# Patient Record
Sex: MALE | Race: WHITE | NOT HISPANIC OR LATINO | Employment: FULL TIME | URBAN - METROPOLITAN AREA
[De-identification: names, ages, dates, MRNs, and addresses within clinical notes are randomized per-mention and may not be internally consistent; named-entity substitution may affect disease eponyms.]

---

## 2019-01-04 ENCOUNTER — TELEPHONE (OUTPATIENT)
Dept: INTERNAL MEDICINE | Facility: CLINIC | Age: 44
End: 2019-01-04

## 2019-01-04 DIAGNOSIS — R21 RASH AND NONSPECIFIC SKIN ERUPTION: Primary | ICD-10-CM

## 2019-01-04 NOTE — TELEPHONE ENCOUNTER
PT CALLED IN STATING THAT HIS CONDITION DID NOT CLEAR UP AND IS NEEDING  A REFERRAL TO DERMATOLOGIST. WOULD LIKE A ONE CLOSE TO [address]

## 2019-01-04 NOTE — TELEPHONE ENCOUNTER
Pt saw Sandy on 11/29/18 and per her note had a rash. Her plan was to refer him to derm if it persisted. Please send referral to derm.

## 2019-01-04 NOTE — TELEPHONE ENCOUNTER
Find out what condition- If it is as nonspecific as rash that is okay. Let me know so I can send the order for derm [facility]

## 2019-01-14 PROBLEM — K21.9 GASTROESOPHAGEAL REFLUX DISEASE WITHOUT ESOPHAGITIS: Status: ACTIVE | Noted: 2019-01-14

## 2019-01-14 PROBLEM — R13.19 ESOPHAGEAL DYSPHAGIA: Status: ACTIVE | Noted: 2019-01-14

## 2019-01-14 PROBLEM — E53.8 B12 DEFICIENCY: Status: ACTIVE | Noted: 2019-01-14

## 2019-01-14 PROBLEM — M54.9 BACK PAIN WITHOUT RADICULOPATHY: Status: ACTIVE | Noted: 2019-01-14

## 2019-01-14 PROBLEM — I10 BENIGN ESSENTIAL HYPERTENSION: Status: ACTIVE | Noted: 2019-01-14

## 2019-01-14 PROBLEM — E78.2 MIXED HYPERLIPIDEMIA: Status: ACTIVE | Noted: 2019-01-14

## 2019-01-23 RX ORDER — HYDROCHLOROTHIAZIDE 25 MG/1
TABLET ORAL
Qty: 90 TABLET | Refills: 0 | Status: SHIPPED | OUTPATIENT
Start: 2019-01-23 | End: 2019-02-07 | Stop reason: DRUGHIGH

## 2019-01-25 ENCOUNTER — TELEPHONE (OUTPATIENT)
Dept: INTERNAL MEDICINE | Facility: CLINIC | Age: 44
End: 2019-01-25

## 2019-01-25 DIAGNOSIS — R23.8 OTHER SKIN CHANGES: Primary | ICD-10-CM

## 2019-02-06 PROBLEM — J30.9 ALLERGIC RHINITIS: Status: ACTIVE | Noted: 2019-02-06

## 2019-02-06 PROBLEM — M76.60 ACHILLES BURSITIS: Status: ACTIVE | Noted: 2019-02-06

## 2019-02-06 PROBLEM — R21 RASH/SKIN ERUPTION: Status: ACTIVE | Noted: 2019-02-06

## 2019-02-06 PROBLEM — E66.3 OVERWEIGHT (BMI 25.0-29.9): Status: ACTIVE | Noted: 2019-02-06

## 2019-02-06 PROBLEM — F43.0 ACUTE STRESS DISORDER: Status: ACTIVE | Noted: 2019-02-06

## 2019-02-06 PROBLEM — L21.9 SEBORRHEIC DERMATITIS: Status: ACTIVE | Noted: 2018-02-16

## 2019-02-07 ENCOUNTER — OFFICE VISIT (OUTPATIENT)
Dept: INTERNAL MEDICINE | Facility: CLINIC | Age: 44
End: 2019-02-07

## 2019-02-07 VITALS
WEIGHT: 217 LBS | HEART RATE: 51 BPM | HEIGHT: 73 IN | SYSTOLIC BLOOD PRESSURE: 122 MMHG | TEMPERATURE: 98.4 F | BODY MASS INDEX: 28.76 KG/M2 | DIASTOLIC BLOOD PRESSURE: 98 MMHG

## 2019-02-07 DIAGNOSIS — K21.9 GASTROESOPHAGEAL REFLUX DISEASE WITHOUT ESOPHAGITIS: ICD-10-CM

## 2019-02-07 DIAGNOSIS — E78.2 MIXED HYPERLIPIDEMIA: ICD-10-CM

## 2019-02-07 DIAGNOSIS — E53.8 B12 DEFICIENCY: ICD-10-CM

## 2019-02-07 DIAGNOSIS — E66.3 OVERWEIGHT (BMI 25.0-29.9): ICD-10-CM

## 2019-02-07 DIAGNOSIS — R13.19 ESOPHAGEAL DYSPHAGIA: ICD-10-CM

## 2019-02-07 DIAGNOSIS — Z00.00 ANNUAL PHYSICAL EXAM: Primary | ICD-10-CM

## 2019-02-07 DIAGNOSIS — R21 RASH OF FACE: ICD-10-CM

## 2019-02-07 DIAGNOSIS — L21.9 SEBORRHEIC DERMATITIS: ICD-10-CM

## 2019-02-07 DIAGNOSIS — I10 BENIGN ESSENTIAL HYPERTENSION: ICD-10-CM

## 2019-02-07 LAB
ALBUMIN SERPL-MCNC: 4.86 G/DL (ref 3.2–4.8)
ALBUMIN/GLOB SERPL: 2 G/DL (ref 1.5–2.5)
ALP SERPL-CCNC: 47 U/L (ref 25–100)
ALT SERPL W P-5'-P-CCNC: 40 U/L (ref 7–40)
ANION GAP SERPL CALCULATED.3IONS-SCNC: 7 MMOL/L (ref 3–11)
ARTICHOKE IGE QN: 111 MG/DL (ref 0–130)
AST SERPL-CCNC: 27 U/L (ref 0–33)
BASOPHILS # BLD AUTO: 0.02 10*3/MM3 (ref 0–0.2)
BASOPHILS NFR BLD AUTO: 0.3 % (ref 0–1)
BILIRUB SERPL-MCNC: 1.1 MG/DL (ref 0.3–1.2)
BILIRUB UR QL STRIP: NEGATIVE
BUN BLD-MCNC: 19 MG/DL (ref 9–23)
BUN/CREAT SERPL: 17.8 (ref 7–25)
CALCIUM SPEC-SCNC: 9.9 MG/DL (ref 8.7–10.4)
CHLORIDE SERPL-SCNC: 102 MMOL/L (ref 99–109)
CHOLEST SERPL-MCNC: 174 MG/DL (ref 0–200)
CLARITY UR: CLEAR
CO2 SERPL-SCNC: 30 MMOL/L (ref 20–31)
COLOR UR: YELLOW
CREAT BLD-MCNC: 1.07 MG/DL (ref 0.6–1.3)
DEPRECATED RDW RBC AUTO: 41 FL (ref 37–54)
EOSINOPHIL # BLD AUTO: 0.18 10*3/MM3 (ref 0–0.3)
EOSINOPHIL NFR BLD AUTO: 2.9 % (ref 0–3)
ERYTHROCYTE [DISTWIDTH] IN BLOOD BY AUTOMATED COUNT: 12.9 % (ref 11.3–14.5)
FOLATE SERPL-MCNC: 13.81 NG/ML (ref 3.2–20)
GFR SERPL CREATININE-BSD FRML MDRD: 75 ML/MIN/1.73
GLOBULIN UR ELPH-MCNC: 2.4 GM/DL
GLUCOSE BLD-MCNC: 92 MG/DL (ref 70–100)
GLUCOSE UR STRIP-MCNC: NEGATIVE MG/DL
HCT VFR BLD AUTO: 50.6 % (ref 38.9–50.9)
HDLC SERPL-MCNC: 37 MG/DL (ref 40–60)
HGB BLD-MCNC: 17.2 G/DL (ref 13.1–17.5)
HGB UR QL STRIP.AUTO: NEGATIVE
IMM GRANULOCYTES # BLD AUTO: 0.01 10*3/MM3 (ref 0–0.03)
IMM GRANULOCYTES NFR BLD AUTO: 0.2 % (ref 0–0.6)
KETONES UR QL STRIP: NEGATIVE
LEUKOCYTE ESTERASE UR QL STRIP.AUTO: NEGATIVE
LYMPHOCYTES # BLD AUTO: 1.68 10*3/MM3 (ref 0.6–4.8)
LYMPHOCYTES NFR BLD AUTO: 27.2 % (ref 24–44)
MCH RBC QN AUTO: 29.6 PG (ref 27–31)
MCHC RBC AUTO-ENTMCNC: 34 G/DL (ref 32–36)
MCV RBC AUTO: 87.1 FL (ref 80–99)
MONOCYTES # BLD AUTO: 0.5 10*3/MM3 (ref 0–1)
MONOCYTES NFR BLD AUTO: 8.1 % (ref 0–12)
NEUTROPHILS # BLD AUTO: 3.8 10*3/MM3 (ref 1.5–8.3)
NEUTROPHILS NFR BLD AUTO: 61.5 % (ref 41–71)
NITRITE UR QL STRIP: NEGATIVE
PH UR STRIP.AUTO: 5.5 [PH] (ref 5–8)
PLATELET # BLD AUTO: 270 10*3/MM3 (ref 150–450)
PMV BLD AUTO: 10.1 FL (ref 6–12)
POTASSIUM BLD-SCNC: 4.1 MMOL/L (ref 3.5–5.5)
PROT SERPL-MCNC: 7.3 G/DL (ref 5.7–8.2)
PROT UR QL STRIP: NEGATIVE
RBC # BLD AUTO: 5.81 10*6/MM3 (ref 4.2–5.76)
SODIUM BLD-SCNC: 139 MMOL/L (ref 132–146)
SP GR UR STRIP: 1.01 (ref 1–1.03)
TRIGL SERPL-MCNC: 166 MG/DL (ref 0–150)
TSH SERPL DL<=0.05 MIU/L-ACNC: 1.31 MIU/ML (ref 0.35–5.35)
UROBILINOGEN UR QL STRIP: NORMAL
WBC NRBC COR # BLD: 6.18 10*3/MM3 (ref 3.5–10.8)

## 2019-02-07 PROCEDURE — 86038 ANTINUCLEAR ANTIBODIES: CPT | Performed by: INTERNAL MEDICINE

## 2019-02-07 PROCEDURE — 80053 COMPREHEN METABOLIC PANEL: CPT | Performed by: INTERNAL MEDICINE

## 2019-02-07 PROCEDURE — 36415 COLL VENOUS BLD VENIPUNCTURE: CPT | Performed by: INTERNAL MEDICINE

## 2019-02-07 PROCEDURE — 80061 LIPID PANEL: CPT | Performed by: INTERNAL MEDICINE

## 2019-02-07 PROCEDURE — 84443 ASSAY THYROID STIM HORMONE: CPT | Performed by: INTERNAL MEDICINE

## 2019-02-07 PROCEDURE — 85025 COMPLETE CBC W/AUTO DIFF WBC: CPT | Performed by: INTERNAL MEDICINE

## 2019-02-07 PROCEDURE — 82746 ASSAY OF FOLIC ACID SERUM: CPT | Performed by: INTERNAL MEDICINE

## 2019-02-07 PROCEDURE — 82607 VITAMIN B-12: CPT | Performed by: INTERNAL MEDICINE

## 2019-02-07 PROCEDURE — 99213 OFFICE O/P EST LOW 20 MIN: CPT | Performed by: INTERNAL MEDICINE

## 2019-02-07 PROCEDURE — 99396 PREV VISIT EST AGE 40-64: CPT | Performed by: INTERNAL MEDICINE

## 2019-02-07 PROCEDURE — 81003 URINALYSIS AUTO W/O SCOPE: CPT | Performed by: INTERNAL MEDICINE

## 2019-02-07 RX ORDER — LISINOPRIL 5 MG/1
5 TABLET ORAL DAILY
Qty: 90 TABLET | Refills: 1 | Status: SHIPPED | OUTPATIENT
Start: 2019-02-07 | End: 2019-03-14 | Stop reason: DRUGHIGH

## 2019-02-07 RX ORDER — PANTOPRAZOLE SODIUM 40 MG/1
40 TABLET, DELAYED RELEASE ORAL DAILY
Qty: 90 TABLET | Refills: 1 | Status: SHIPPED | OUTPATIENT
Start: 2019-02-07 | End: 2020-07-10 | Stop reason: SDUPTHER

## 2019-02-07 NOTE — PATIENT INSTRUCTIONS
Diastolic blood pressure needs to be less than 85 all the time.  Today it was 98.  His systolic pressure tends to be controlled well.  Stop the hydrochlorothiazide.  Start lisinopril 5 mg once a day.  Monitor the blood pressure at home and write down the values.  We will go over those at the next visit.  Also avoid salt in the diet and resume regular exercise.  Losing some weight will also help.    Acid reflux and trouble swallowing with food sticking has been worse lately.  Start taking pantoprazole every morning before breakfast.  If you forget, take it later and will still help some.  Referral sent to [facility] gastroenterology for an EGD (upper endoscopy) to rule out esophageal stricture.  Education given on antireflux precautions including eating smaller meals, eating dinner earlier, never lying down after eating.    The rash on the face has not improved.  He has seen a dermatologist.  The dermatology PA recommended hydrocortisone cream twice a day.  That has not helped.  He has an appointment with Dr. Estrada next week.  We will check  ALICIA today to make sure there is no autoimmune reason for rash although it does not appear likely.  There is a possibility that hydrochlorothiazide could be causing the rash although it is only on the face.  That is another reason to stop the hydrochlorothiazide.    Improving the low-fat and low sugar diet and increasing exercise will help with the cholesterol and triglycerides.  Increasing exercise to 3-4 times a week and losing some weight will also help.  Food Choices for Gastroesophageal Reflux Disease, Adult  When you have gastroesophageal reflux disease (GERD), the foods you eat and your eating habits are very important. Choosing the right foods can help ease the discomfort of GERD. Consider working with a diet and nutrition specialist (dietitian) to help you make healthy food choices.  What general guidelines should I follow?  Eating plan  · Choose healthy foods low in fat,  such as fruits, vegetables, whole grains, low-fat dairy products, and lean meat, fish, and poultry.  · Eat frequent, small meals instead of three large meals each day. Eat your meals slowly, in a relaxed setting. Avoid bending over or lying down until 2-3 hours after eating.  · Limit high-fat foods such as fatty meats or fried foods.  · Limit your intake of oils, butter, and shortening to less than 8 teaspoons each day.  · Avoid the following:  ? Foods that cause symptoms. These may be different for different people. Keep a food diary to keep track of foods that cause symptoms.  ? Alcohol.  ? Drinking large amounts of liquid with meals.  ? Eating meals during the 2-3 hours before bed.  · Cook foods using methods other than frying. This may include baking, grilling, or broiling.  Lifestyle    · Maintain a healthy weight. Ask your health care provider what weight is healthy for you. If you need to lose weight, work with your health care provider to do so safely.  · Exercise for at least 30 minutes on 5 or more days each week, or as told by your health care provider.  · Avoid wearing clothes that fit tightly around your waist and chest.  · Do not use any products that contain nicotine or tobacco, such as cigarettes and e-cigarettes. If you need help quitting, ask your health care provider.  · Sleep with the head of your bed raised. Use a wedge under the mattress or blocks under the bed frame to raise the head of the bed.  What foods are not recommended?  The items listed may not be a complete list. Talk with your dietitian about what dietary choices are best for you.  Grains  Pastries or quick breads with added fat. French toast.  Vegetables  Deep fried vegetables. French fries. Any vegetables prepared with added fat. Any vegetables that cause symptoms. For some people this may include tomatoes and tomato products, chili peppers, onions and garlic, and horseradish.  Fruits  Any fruits prepared with added fat. Any  fruits that cause symptoms. For some people this may include citrus fruits, such as oranges, grapefruit, pineapple, and kp.  Meats and other protein foods  High-fat meats, such as fatty beef or pork, hot dogs, ribs, ham, sausage, salami and khanna. Fried meat or protein, including fried fish and fried chicken. Nuts and nut butters.  Dairy  Whole milk and chocolate milk. Sour cream. Cream. Ice cream. Cream cheese. Milk shakes.  Beverages  Coffee and tea, with or without caffeine. Carbonated beverages. Sodas. Energy drinks. Fruit juice made with acidic fruits (such as orange or grapefruit). Tomato juice. Alcoholic drinks.  Fats and oils  Butter. Margarine. Shortening. Ghee.  Sweets and desserts  Chocolate and cocoa. Donuts.  Seasoning and other foods  Pepper. Peppermint and spearmint. Any condiments, herbs, or seasonings that cause symptoms. For some people, this may include moreno, hot sauce, or vinegar-based salad dressings.  Summary  · When you have gastroesophageal reflux disease (GERD), food and lifestyle choices are very important to help ease the discomfort of GERD.  · Eat frequent, small meals instead of three large meals each day. Eat your meals slowly, in a relaxed setting. Avoid bending over or lying down until 2-3 hours after eating.  · Limit high-fat foods such as fatty meat or fried foods.  This information is not intended to replace advice given to you by your health care provider. Make sure you discuss any questions you have with your health care provider.  Document Released: 12/18/2006 Document Revised: 12/19/2017 Document Reviewed: 12/19/2017  [employer] Interactive Patient Education © 2018 [employer]    Heartburn  Heartburn is a type of pain or discomfort that can happen in the throat or chest. It is often described as a burning pain. It may also cause a bad taste in the mouth. Heartburn may feel worse when you lie down or bend over, and it is often worse at night. Heartburn may be caused by  stomach contents that move back up into the esophagus (reflux).  Follow these instructions at home:  Take these actions to decrease your discomfort and to help avoid complications.  Diet  · Follow a diet as recommended by your health care provider. This may involve avoiding foods and drinks such as:  ? Coffee and tea (with or without caffeine).  ? Drinks that contain alcohol.  ? Energy drinks and sports drinks.  ? Carbonated drinks or sodas.  ? Chocolate and cocoa.  ? Peppermint and mint flavorings.  ? Garlic and onions.  ? Horseradish.  ? Spicy and acidic foods, including peppers, chili powder, moreno powder, vinegar, hot sauces, and barbecue sauce.  ? Citrus fruit juices and citrus fruits, such as oranges, kp, and limes.  ? Tomato-based foods, such as red sauce, chili, salsa, and pizza with red sauce.  ? Fried and fatty foods, such as donuts, french fries, potato chips, and high-fat dressings.  ? High-fat meats, such as hot dogs and fatty cuts of red and white meats, such as rib eye steak, sausage, ham, and khanna.  ? High-fat dairy items, such as whole milk, butter, and cream cheese.  · Eat small, frequent meals instead of large meals.  · Avoid drinking large amounts of liquid with your meals.  · Avoid eating meals during the 2-3 hours before bedtime.  · Avoid lying down right after you eat.  · Do not exercise right after you eat.  General instructions  · Pay attention to any changes in your symptoms.  · Take over-the-counter and prescription medicines only as told by your health care provider. Do not take aspirin, ibuprofen, or other NSAIDs unless your health care provider told you to do so.  · Do not use any tobacco products, including cigarettes, chewing tobacco, and e-cigarettes. If you need help quitting, ask your health care provider.  · Wear loose-fitting clothing. Do not wear anything tight around your waist that causes pressure on your abdomen.  · Raise (elevate) the head of your bed about 6 inches (15  cm).  · Try to reduce your stress, such as with yoga or meditation. If you need help reducing stress, ask your health care provider.  · If you are overweight, reduce your weight to an amount that is healthy for you. Ask your health care provider for guidance about a safe weight loss goal.  · Keep all follow-up visits as told by your health care provider. This is important.  Contact a health care provider if:  · You have new symptoms.  · You have unexplained weight loss.  · You have difficulty swallowing, or it hurts to swallow.  · You have wheezing or a persistent cough.  · Your symptoms do not improve with treatment.  · You have frequent heartburn for more than two weeks.  Get help right away if:  · You have pain in your arms, neck, jaw, teeth, or back.  · You feel sweaty, dizzy, or light-headed.  · You have chest pain or shortness of breath.  · You vomit and your vomit looks like blood or coffee grounds.  · Your stool is bloody or black.  This information is not intended to replace advice given to you by your health care provider. Make sure you discuss any questions you have with your health care provider.  Document Released: 05/06/2010 Document Revised: 05/25/2017 Document Reviewed: 04/13/2016  [employer] Interactive Patient Education © 2018 [employer]    Heart-Healthy Eating Plan  Many factors influence your heart health, including eating and exercise habits. Heart (coronary) risk increases with abnormal blood fat (lipid) levels. Heart-healthy meal planning includes limiting unhealthy fats, increasing healthy fats, and making other small dietary changes. This includes maintaining a healthy body weight to help keep lipid levels within a normal range.  What is my plan?  Your health care provider recommends that you:  · Get no more than _________% of the total calories in your daily diet from fat.  · Limit your intake of saturated fat to less than _________% of your total calories each day.  · Limit the amount of  "cholesterol in your diet to less than _________ mg per day.    What types of fat should I choose?  · Choose healthy fats more often. Choose monounsaturated and polyunsaturated fats, such as olive oil and canola oil, flaxseeds, walnuts, almonds, and seeds.  · Eat more omega-3 fats. Good choices include salmon, mackerel, sardines, tuna, flaxseed oil, and ground flaxseeds. Aim to eat fish at least two times each week.  · Limit saturated fats. Saturated fats are primarily found in animal products, such as meats, butter, and cream. Plant sources of saturated fats include palm oil, palm kernel oil, and coconut oil.  · Avoid foods with partially hydrogenated oils in them. These contain trans fats. Examples of foods that contain trans fats are stick margarine, some tub margarines, cookies, crackers, and other baked goods.  What general guidelines do I need to follow?  · Check food labels carefully to identify foods with trans fats or high amounts of saturated fat.  · Fill one half of your plate with vegetables and green salads. Eat 4-5 servings of vegetables per day. A serving of vegetables equals 1 cup of raw leafy vegetables, ½ cup of raw or cooked cut-up vegetables, or ½ cup of vegetable juice.  · Fill one fourth of your plate with whole grains. Look for the word \"whole\" as the first word in the ingredient list.  · Fill one fourth of your plate with lean protein foods.  · Eat 4-5 servings of fruit per day. A serving of fruit equals one medium whole fruit, ¼ cup of dried fruit, ½ cup of fresh, frozen, or canned fruit, or ½ cup of 100% fruit juice.  · Eat more foods that contain soluble fiber. Examples of foods that contain this type of fiber are apples, broccoli, carrots, beans, peas, and barley. Aim to get 20-30 g of fiber per day.  · Eat more home-cooked food and less restaurant, buffet, and fast food.  · Limit or avoid alcohol.  · Limit foods that are high in starch and sugar.  · Avoid fried foods.  · Cook foods by " using methods other than frying. Baking, boiling, grilling, and broiling are all great options. Other fat-reducing suggestions include:  ? Removing the skin from poultry.  ? Removing all visible fats from meats.  ? Skimming the fat off of stews, soups, and gravies before serving them.  ? Steaming vegetables in water or broth.  · Lose weight if you are overweight. Losing just 5-10% of your initial body weight can help your overall health and prevent diseases such as diabetes and heart disease.  · Increase your consumption of nuts, legumes, and seeds to 4-5 servings per week. One serving of dried beans or legumes equals ½ cup after being cooked, one serving of nuts equals 1½ ounces, and one serving of seeds equals ½ ounce or 1 tablespoon.  · You may need to monitor your salt (sodium) intake, especially if you have high blood pressure. Talk with your health care provider or dietitian to get more information about reducing sodium.  What foods can I eat?  Grains    Breads, including Lithuanian, white, carmen, wheat, raisin, rye, oatmeal, and Italian. Tortillas that are neither fried nor made with lard or trans fat. Low-fat rolls, including hotdog and hamburger buns and English muffins. Biscuits. Muffins. Waffles. Pancakes. Light popcorn. Whole-grain cereals. Flatbread. Karely toast. Pretzels. Breadsticks. Rusks. Low-fat snacks and crackers, including oyster, saltine, matzo, paul, animal, and rye. Rice and pasta, including brown rice and those that are made with whole wheat.  Vegetables  All vegetables.  Fruits  All fruits, but limit coconut.  Meats and Other Protein Sources  Lean, well-trimmed beef, veal, pork, and lamb. Chicken and turkey without skin. All fish and shellfish. Wild duck, rabbit, pheasant, and venison. Egg whites or low-cholesterol egg substitutes. Dried beans, peas, lentils, and tofu. Seeds and most nuts.  Dairy  Low-fat or nonfat cheeses, including ricotta, string, and mozzarella. Skim or 1% milk that is  liquid, powdered, or evaporated. Buttermilk that is made with low-fat milk. Nonfat or low-fat yogurt.  Beverages  Mineral water. Diet carbonated beverages.  Sweets and Desserts  Sherbets and fruit ices. Honey, jam, marmalade, jelly, and syrups. Meringues and gelatins. Pure sugar candy, such as hard candy, jelly beans, gumdrops, mints, marshmallows, and small amounts of dark chocolate. Brett food cake.  Eat all sweets and desserts in moderation.  Fats and Oils  Nonhydrogenated (trans-free) margarines. Vegetable oils, including soybean, sesame, sunflower, olive, peanut, safflower, corn, canola, and cottonseed. Salad dressings or mayonnaise that are made with a vegetable oil. Limit added fats and oils that you use for cooking, baking, salads, and as spreads.  Other  Cocoa powder. Coffee and tea. All seasonings and condiments.  The items listed above may not be a complete list of recommended foods or beverages. Contact your dietitian for more options.  What foods are not recommended?  Grains  Breads that are made with saturated or trans fats, oils, or whole milk. Croissants. Butter rolls. Cheese breads. Sweet rolls. Donuts. Buttered popcorn. Chow mein noodles. High-fat crackers, such as cheese or butter crackers.  Meats and Other Protein Sources  Fatty meats, such as hotdogs, short ribs, sausage, spareribs, khanna, ribeye roast or steak, and mutton. High-fat deli meats, such as salami and bologna. Caviar. Domestic duck and goose. Organ meats, such as kidney, liver, sweetbreads, brains, gizzard, chitterlings, and heart.  Dairy  Cream, sour cream, cream cheese, and creamed cottage cheese. Whole milk cheeses, including blue (nancy), Cannon Rafiq, Brie, Reji, American, Havarti, Swiss, cheddar, Camembert, and [place]. Whole or 2% milk that is liquid, evaporated, or condensed. Whole buttermilk. Cream sauce or high-fat cheese sauce. Yogurt that is made from whole milk.  Beverages  Regular sodas and drinks with added  sugar.  Sweets and Desserts  Frosting. Pudding. Cookies. Cakes other than bg food cake. Candy that has milk chocolate or white chocolate, hydrogenated fat, butter, coconut, or unknown ingredients. Buttered syrups. Full-fat ice cream or ice cream drinks.  Fats and Oils  Gravy that has suet, meat fat, or shortening. Cocoa butter, hydrogenated oils, palm oil, coconut oil, palm kernel oil. These can often be found in baked products, candy, fried foods, nondairy creamers, and whipped toppings. Solid fats and shortenings, including khanna fat, salt pork, lard, and butter. Nondairy cream substitutes, such as coffee creamers and sour cream substitutes. Salad dressings that are made of unknown oils, cheese, or sour cream.  The items listed above may not be a complete list of foods and beverages to avoid. Contact your dietitian for more information.  This information is not intended to replace advice given to you by your health care provider. Make sure you discuss any questions you have with your health care provider.  Document Released: 09/26/2009 Document Revised: 07/07/2017 Document Reviewed: 06/11/2015  [employer] Interactive Patient Education © 2018 [employer]    Exercising to Stay Healthy  Exercising regularly is important. It has many health benefits, such as:  · Improving your overall fitness, flexibility, and endurance.  · Increasing your bone density.  · Helping with weight control.  · Decreasing your body fat.  · Increasing your muscle strength.  · Reducing stress and tension.  · Improving your overall health.    In order to become healthy and stay healthy, it is recommended that you do moderate-intensity and vigorous-intensity exercise. You can tell that you are exercising at a moderate intensity if you have a higher heart rate and faster breathing, but you are still able to hold a conversation. You can tell that you are exercising at a vigorous intensity if you are breathing much harder and faster and cannot  hold a conversation while exercising.  How often should I exercise?  Choose an activity that you enjoy and set realistic goals. Your health care provider can help you to make an activity plan that works for you. Exercise regularly as directed by your health care provider. This may include:  · Doing resistance training twice each week, such as:  ? Push-ups.  ? Sit-ups.  ? Lifting weights.  ? Using resistance bands.  · Doing a given intensity of exercise for a given amount of time. Choose from these options:  ? 150 minutes of moderate-intensity exercise every week.  ? 75 minutes of vigorous-intensity exercise every week.  ? A mix of moderate-intensity and vigorous-intensity exercise every week.    Children, pregnant women, people who are out of shape, people who are overweight, and older adults may need to consult a health care provider for individual recommendations. If you have any sort of medical condition, be sure to consult your health care provider before starting a new exercise program.  What are some exercise ideas?  Some moderate-intensity exercise ideas include:  · Walking at a rate of 1 mile in 15 minutes.  · Biking.  · Hiking.  · Golfing.  · Dancing.    Some vigorous-intensity exercise ideas include:  · Walking at a rate of at least 4.5 miles per hour.  · Jogging or running at a rate of 5 miles per hour.  · Biking at a rate of at least 10 miles per hour.  · Lap swimming.  · Roller-skating or in-line skating.  · Cross-country skiing.  · Vigorous competitive sports, such as football, basketball, and soccer.  · Jumping rope.  · Aerobic dancing.    What are some everyday activities that can help me to get exercise?  · Yard work, such as:  ? Pushing a .  ? Raking and bagging leaves.  · Washing and waxing your car.  · Pushing a stroller.  · Shoveling snow.  · Gardening.  · Washing windows or floors.  How can I be more active in my day-to-day activities?  · Use the stairs instead of the  elevator.  · Take a walk during your lunch break.  · If you drive, park your car farther away from work or school.  · If you take public transportation, get off one stop early and walk the rest of the way.  · Make all of your phone calls while standing up and walking around.  · Get up, stretch, and walk around every 30 minutes throughout the day.  What guidelines should I follow while exercising?  · Do not exercise so much that you hurt yourself, feel dizzy, or get very short of breath.  · Consult your health care provider before starting a new exercise program.  · Wear comfortable clothes and shoes with good support.  · Drink plenty of water while you exercise to prevent dehydration or heat stroke. Body water is lost during exercise and must be replaced.  · Work out until you breathe faster and your heart beats faster.  This information is not intended to replace advice given to you by your health care provider. Make sure you discuss any questions you have with your health care provider.  Document Released: 01/20/2012 Document Revised: 05/25/2017 Document Reviewed: 05/21/2015  [employer] Interactive Patient Education © 2018 [employer]

## 2019-02-07 NOTE — PROGRESS NOTES
QUICK REFERENCE INFORMATION:  The ABCs of the Annual Wellness Visit    Annual Wellness visit    HEALTH RISK ASSESSMENT    1975    Recent History  No hospitalization(s) within the last year..        Current Medical Providers:  Patient Care Team:  Sherri Anna MD as PCP - General (Internal Medicine)        Smoking Status:  Social History     Tobacco Use   Smoking Status Never Smoker   Smokeless Tobacco Never Used       Alcohol Consumption:  Social History     Substance and Sexual Activity   Alcohol Use Yes   • Frequency: 2-3 times a week   • Drinks per session: 1 or 2   • Binge frequency: Never    Comment: socially       Depression Screen:   PHQ-2/PHQ-9 Depression Screening 2/7/2019   Little interest or pleasure in doing things 0   Feeling down, depressed, or hopeless 0   Total Score 0       Health Habits:              Recent Lab Results:  CMP:  Lab Results   Component Value Date    BUN 19 02/07/2019    CREATININE 1.07 02/07/2019    EGFRIFNONA 75 02/07/2019    BCR 17.8 02/07/2019     02/07/2019    K 4.1 02/07/2019    CO2 30.0 02/07/2019    CALCIUM 9.9 02/07/2019    ALBUMIN 4.86 (H) 02/07/2019    BILITOT 1.1 02/07/2019    ALKPHOS 47 02/07/2019    AST 27 02/07/2019    ALT 40 02/07/2019     Lipid Panel:  Lab Results   Component Value Date    CHOL 174 02/07/2019    TRIG 166 (H) 02/07/2019    HDL 37 (L) 02/07/2019     HbA1c:           Age-appropriate Screening Schedule:  Refer to the list below for future screening recommendations based on patient's age, sex and/or medical conditions. Orders for these recommended tests are listed in the plan section. The patient has been provided with a written plan.    43 y.o.  Age appropriate preventive counseling done including age appropriate vaccines, regular dental visits, mental health, injury prevention such as wearing seat belt and preventing falls, healthy  nutrition, healthy weight, regular physical exercise. Alcohol use is moderate.  Tobacco history-none. Drug  use-none.  STD's-not at risk.      Health Maintenance   Topic Date Due   • INFLUENZA VACCINE  08/01/2018   • LIPID PANEL  08/22/2019   • TDAP/TD VACCINES (2 - Td) 12/29/2025        Subjective   History of Present Illness  HPI: C/o heartburn has been a lot worse lately.  He is noticing acid in the throat as well.  He has trouble swallowing, especially chicken.  He feels like it is getting stuck mom go down.  He tried the famotidine but said that it made his stomach hurt.  So he stopped it and has not been taking anything.    Colin Cheema is a 43 y.o. male who presents for an Annual Wellness Visit and f/u of chronic conditions including hypertension,hyperlipidemia,GERD,face rash.    CHRONIC CONDITIONS:    Blood pressure is always small controlled on the top number, running 120s.  The bottom number is often over 90.  Today it was 98.    He has not been exercising lately and has gained some weight.  He has not been following a low fat and low sugar diet as carefully lately.    The rash on his face did not improve with the cream that I gave him.  The rash itches and feels a little irritated sometimes.  He saw the dermatologist PA and was given hydrocortisone 2.5% to use twice a day.  She told him the cream that I gave him was too strong.  Hydrocortisone has not helped either.  So he stopped using it.  She also gave him a face wash which he said did not help and he stopped it as well.  He did make an appointment to see Dr. Estrada next week.    He has not been taking B12 supplement.    The following portions of the patient's history were reviewed and updated as appropriate: allergies, current medications, past family history, past medical history, past social history, past surgical history and problem list.    Outpatient Medications Prior to Visit   Medication Sig Dispense Refill   • hydrocortisone 2.5 % cream      • hydrochlorothiazide (HYDRODIURIL) 25 MG tablet TAKE 1 TABLET BY MOUTH  EVERY DAY 90 tablet 0     No  facility-administered medications prior to visit.        Patient Active Problem List   Diagnosis   • Back pain without radiculopathy   • Gastroesophageal reflux disease without esophagitis   • Esophageal dysphagia   • B12 deficiency   • Mixed hyperlipidemia   • Benign essential hypertension   • Seborrheic dermatitis   • Left-sided low back pain with sciatica   • Plantar fasciitis   • Fatigue   • Acute stress disorder   • Allergic rhinitis   • Overweight (BMI 25.0-29.9)   • Achilles bursitis or tendinitis   • Rash/skin eruption       Advance Care Planning:  has NO advance directive - not interested in additional information    Identification of Risk Factors:  Risk factors include: weight , unhealthy diet and inactivity.    Review of Systems   Constitutional: Negative for chills, fatigue and fever.   Respiratory: Negative for cough, shortness of breath and wheezing.    Cardiovascular: Negative for chest pain and palpitations.   Gastrointestinal: Negative for abdominal pain.       Compared to one year ago, the patient feels his physical health is the same.  Compared to one year ago, the patient feels his mental health is the same.    Objective     Physical Exam   Constitutional: He is oriented to person, place, and time. He appears well-developed and well-nourished.   HENT:   Head: Normocephalic.   Eyes: Conjunctivae and EOM are normal. Pupils are equal, round, and reactive to light.   Neck: Normal range of motion. Neck supple. No thyromegaly present.   Cardiovascular: Normal rate, regular rhythm, normal heart sounds and intact distal pulses.   Pulmonary/Chest: Effort normal and breath sounds normal. He has no wheezes.   Abdominal: Soft. Bowel sounds are normal. There is no tenderness.   Musculoskeletal: Normal range of motion. He exhibits no edema or tenderness.   Lymphadenopathy:     He has no cervical adenopathy.   Neurological: He is alert and oriented to person, place, and time. He has normal strength. No cranial  "nerve deficit or sensory deficit. Coordination and gait normal.   Skin: Skin is warm and dry. Rash noted.   Red rash on face.     Psychiatric: He has a normal mood and affect. His speech is normal and behavior is normal. Judgment and thought content normal. Cognition and memory are normal.   Nursing note and vitals reviewed.      Vitals:    02/07/19 0922   BP: 122/98   BP Location: Left arm   Patient Position: Sitting   Cuff Size: Adult   Pulse: 51   Temp: 98.4 °F (36.9 °C)   TempSrc: Temporal   Weight: 98.4 kg (217 lb)   Height: 185.4 cm (73\")       Patient's Body mass index is 28.63 kg/m². BMI is above normal parameters. Recommendations include: educational material, exercise counseling and nutrition counseling.      CMP:  Lab Results   Component Value Date    BUN 19 02/07/2019    CREATININE 1.07 02/07/2019    EGFRIFNONA 75 02/07/2019    BCR 17.8 02/07/2019     02/07/2019    K 4.1 02/07/2019    CO2 30.0 02/07/2019    CALCIUM 9.9 02/07/2019    ALBUMIN 4.86 (H) 02/07/2019    BILITOT 1.1 02/07/2019    ALKPHOS 47 02/07/2019    AST 27 02/07/2019    ALT 40 02/07/2019     HbA1c:  No results found for: HGBA1C  Microalbumin:  No results found for: MICROALBUR, POCMALB, POCCREAT  Lipid Panel  Lab Results   Component Value Date    CHOL 174 02/07/2019    TRIG 166 (H) 02/07/2019    HDL 37 (L) 02/07/2019     02/07/2019    AST 27 02/07/2019    ALT 40 02/07/2019       Assessment/Plan   Patient Self-Management and Personalized Health Advice  The patient has been provided with information about: diet, exercise and weight management and preventive services including:   · Advance directive, Exercise counseling provided, Nutrition counseling provided.    Visit Diagnoses:    ICD-10-CM ICD-9-CM   1. Annual physical exam Z00.00 V70.0   2. Benign essential hypertension I10 401.1   3. Gastroesophageal reflux disease without esophagitis K21.9 530.81   4. Esophageal dysphagia R13.10 787.20   5. Mixed hyperlipidemia E78.2 272.2 "   6. Rash of face R21 782.1   7. B12 deficiency E53.8 266.2   8. Seborrheic dermatitis L21.9 690.10   9. Overweight (BMI 25.0-29.9) E66.3 278.02   10. BMI 28.0-28.9,adult Z68.28 V85.24       Patient Instructions   Diastolic blood pressure needs to be less than 85 all the time.  Today it was 98.  His systolic pressure tends to be controlled well.  Stop the hydrochlorothiazide.  Start lisinopril 5 mg once a day.  Monitor the blood pressure at home and write down the values.  We will go over those at the next visit.  Also avoid salt in the diet and resume regular exercise.  Losing some weight will also help.    Acid reflux and trouble swallowing with food sticking has been worse lately.  Start taking pantoprazole every morning before breakfast.  If you forget, take it later and will still help some.  Referral sent to [facility] gastroenterology for an EGD (upper endoscopy) to rule out esophageal stricture.  Education given on antireflux precautions including eating smaller meals, eating dinner earlier, never lying down after eating.    The rash on the face has not improved.  He has seen a dermatologist.  The dermatology PA recommended hydrocortisone cream twice a day.  That has not helped.  He has an appointment with Dr. Estrada next week.  We will check  ALICIA today to make sure there is no autoimmune reason for rash although it does not appear likely.  There is a possibility that hydrochlorothiazide could be causing the rash although it is only on the face.  That is another reason to stop the hydrochlorothiazide.    Improving the low-fat and low sugar diet and increasing exercise will help with the cholesterol and triglycerides.  Increasing exercise to 3-4 times a week and losing some weight will also help.  Food Choices for Gastroesophageal Reflux Disease, Adult  When you have gastroesophageal reflux disease (GERD), the foods you eat and your eating habits are very important. Choosing the right foods can help ease the  discomfort of GERD. Consider working with a diet and nutrition specialist (dietitian) to help you make healthy food choices.  What general guidelines should I follow?  Eating plan  · Choose healthy foods low in fat, such as fruits, vegetables, whole grains, low-fat dairy products, and lean meat, fish, and poultry.  · Eat frequent, small meals instead of three large meals each day. Eat your meals slowly, in a relaxed setting. Avoid bending over or lying down until 2-3 hours after eating.  · Limit high-fat foods such as fatty meats or fried foods.  · Limit your intake of oils, butter, and shortening to less than 8 teaspoons each day.  · Avoid the following:  ? Foods that cause symptoms. These may be different for different people. Keep a food diary to keep track of foods that cause symptoms.  ? Alcohol.  ? Drinking large amounts of liquid with meals.  ? Eating meals during the 2-3 hours before bed.  · Cook foods using methods other than frying. This may include baking, grilling, or broiling.  Lifestyle    · Maintain a healthy weight. Ask your health care provider what weight is healthy for you. If you need to lose weight, work with your health care provider to do so safely.  · Exercise for at least 30 minutes on 5 or more days each week, or as told by your health care provider.  · Avoid wearing clothes that fit tightly around your waist and chest.  · Do not use any products that contain nicotine or tobacco, such as cigarettes and e-cigarettes. If you need help quitting, ask your health care provider.  · Sleep with the head of your bed raised. Use a wedge under the mattress or blocks under the bed frame to raise the head of the bed.  What foods are not recommended?  The items listed may not be a complete list. Talk with your dietitian about what dietary choices are best for you.  Grains  Pastries or quick breads with added fat. French toast.  Vegetables  Deep fried vegetables. French fries. Any vegetables prepared with  added fat. Any vegetables that cause symptoms. For some people this may include tomatoes and tomato products, chili peppers, onions and garlic, and horseradish.  Fruits  Any fruits prepared with added fat. Any fruits that cause symptoms. For some people this may include citrus fruits, such as oranges, grapefruit, pineapple, and kp.  Meats and other protein foods  High-fat meats, such as fatty beef or pork, hot dogs, ribs, ham, sausage, salami and khanna. Fried meat or protein, including fried fish and fried chicken. Nuts and nut butters.  Dairy  Whole milk and chocolate milk. Sour cream. Cream. Ice cream. Cream cheese. Milk shakes.  Beverages  Coffee and tea, with or without caffeine. Carbonated beverages. Sodas. Energy drinks. Fruit juice made with acidic fruits (such as orange or grapefruit). Tomato juice. Alcoholic drinks.  Fats and oils  Butter. Margarine. Shortening. Ghee.  Sweets and desserts  Chocolate and cocoa. Donuts.  Seasoning and other foods  Pepper. Peppermint and spearmint. Any condiments, herbs, or seasonings that cause symptoms. For some people, this may include moreno, hot sauce, or vinegar-based salad dressings.  Summary  · When you have gastroesophageal reflux disease (GERD), food and lifestyle choices are very important to help ease the discomfort of GERD.  · Eat frequent, small meals instead of three large meals each day. Eat your meals slowly, in a relaxed setting. Avoid bending over or lying down until 2-3 hours after eating.  · Limit high-fat foods such as fatty meat or fried foods.  This information is not intended to replace advice given to you by your health care provider. Make sure you discuss any questions you have with your health care provider.  Document Released: 12/18/2006 Document Revised: 12/19/2017 Document Reviewed: 12/19/2017  [employer] Interactive Patient Education © 2018 [employer]    Heartburn  Heartburn is a type of pain or discomfort that can happen in the throat or  chest. It is often described as a burning pain. It may also cause a bad taste in the mouth. Heartburn may feel worse when you lie down or bend over, and it is often worse at night. Heartburn may be caused by stomach contents that move back up into the esophagus (reflux).  Follow these instructions at home:  Take these actions to decrease your discomfort and to help avoid complications.  Diet  · Follow a diet as recommended by your health care provider. This may involve avoiding foods and drinks such as:  ? Coffee and tea (with or without caffeine).  ? Drinks that contain alcohol.  ? Energy drinks and sports drinks.  ? Carbonated drinks or sodas.  ? Chocolate and cocoa.  ? Peppermint and mint flavorings.  ? Garlic and onions.  ? Horseradish.  ? Spicy and acidic foods, including peppers, chili powder, moreno powder, vinegar, hot sauces, and barbecue sauce.  ? Citrus fruit juices and citrus fruits, such as oranges, kp, and limes.  ? Tomato-based foods, such as red sauce, chili, salsa, and pizza with red sauce.  ? Fried and fatty foods, such as donuts, french fries, potato chips, and high-fat dressings.  ? High-fat meats, such as hot dogs and fatty cuts of red and white meats, such as rib eye steak, sausage, ham, and khanna.  ? High-fat dairy items, such as whole milk, butter, and cream cheese.  · Eat small, frequent meals instead of large meals.  · Avoid drinking large amounts of liquid with your meals.  · Avoid eating meals during the 2-3 hours before bedtime.  · Avoid lying down right after you eat.  · Do not exercise right after you eat.  General instructions  · Pay attention to any changes in your symptoms.  · Take over-the-counter and prescription medicines only as told by your health care provider. Do not take aspirin, ibuprofen, or other NSAIDs unless your health care provider told you to do so.  · Do not use any tobacco products, including cigarettes, chewing tobacco, and e-cigarettes. If you need help  quitting, ask your health care provider.  · Wear loose-fitting clothing. Do not wear anything tight around your waist that causes pressure on your abdomen.  · Raise (elevate) the head of your bed about 6 inches (15 cm).  · Try to reduce your stress, such as with yoga or meditation. If you need help reducing stress, ask your health care provider.  · If you are overweight, reduce your weight to an amount that is healthy for you. Ask your health care provider for guidance about a safe weight loss goal.  · Keep all follow-up visits as told by your health care provider. This is important.  Contact a health care provider if:  · You have new symptoms.  · You have unexplained weight loss.  · You have difficulty swallowing, or it hurts to swallow.  · You have wheezing or a persistent cough.  · Your symptoms do not improve with treatment.  · You have frequent heartburn for more than two weeks.  Get help right away if:  · You have pain in your arms, neck, jaw, teeth, or back.  · You feel sweaty, dizzy, or light-headed.  · You have chest pain or shortness of breath.  · You vomit and your vomit looks like blood or coffee grounds.  · Your stool is bloody or black.  This information is not intended to replace advice given to you by your health care provider. Make sure you discuss any questions you have with your health care provider.  Document Released: 05/06/2010 Document Revised: 05/25/2017 Document Reviewed: 04/13/2016  [employer] Interactive Patient Education © 2018 [employer]    Heart-Healthy Eating Plan  Many factors influence your heart health, including eating and exercise habits. Heart (coronary) risk increases with abnormal blood fat (lipid) levels. Heart-healthy meal planning includes limiting unhealthy fats, increasing healthy fats, and making other small dietary changes. This includes maintaining a healthy body weight to help keep lipid levels within a normal range.  What is my plan?  Your health care provider  "recommends that you:  · Get no more than _________% of the total calories in your daily diet from fat.  · Limit your intake of saturated fat to less than _________% of your total calories each day.  · Limit the amount of cholesterol in your diet to less than _________ mg per day.    What types of fat should I choose?  · Choose healthy fats more often. Choose monounsaturated and polyunsaturated fats, such as olive oil and canola oil, flaxseeds, walnuts, almonds, and seeds.  · Eat more omega-3 fats. Good choices include salmon, mackerel, sardines, tuna, flaxseed oil, and ground flaxseeds. Aim to eat fish at least two times each week.  · Limit saturated fats. Saturated fats are primarily found in animal products, such as meats, butter, and cream. Plant sources of saturated fats include palm oil, palm kernel oil, and coconut oil.  · Avoid foods with partially hydrogenated oils in them. These contain trans fats. Examples of foods that contain trans fats are stick margarine, some tub margarines, cookies, crackers, and other baked goods.  What general guidelines do I need to follow?  · Check food labels carefully to identify foods with trans fats or high amounts of saturated fat.  · Fill one half of your plate with vegetables and green salads. Eat 4-5 servings of vegetables per day. A serving of vegetables equals 1 cup of raw leafy vegetables, ½ cup of raw or cooked cut-up vegetables, or ½ cup of vegetable juice.  · Fill one fourth of your plate with whole grains. Look for the word \"whole\" as the first word in the ingredient list.  · Fill one fourth of your plate with lean protein foods.  · Eat 4-5 servings of fruit per day. A serving of fruit equals one medium whole fruit, ¼ cup of dried fruit, ½ cup of fresh, frozen, or canned fruit, or ½ cup of 100% fruit juice.  · Eat more foods that contain soluble fiber. Examples of foods that contain this type of fiber are apples, broccoli, carrots, beans, peas, and barley. Aim to " get 20-30 g of fiber per day.  · Eat more home-cooked food and less restaurant, buffet, and fast food.  · Limit or avoid alcohol.  · Limit foods that are high in starch and sugar.  · Avoid fried foods.  · Cook foods by using methods other than frying. Baking, boiling, grilling, and broiling are all great options. Other fat-reducing suggestions include:  ? Removing the skin from poultry.  ? Removing all visible fats from meats.  ? Skimming the fat off of stews, soups, and gravies before serving them.  ? Steaming vegetables in water or broth.  · Lose weight if you are overweight. Losing just 5-10% of your initial body weight can help your overall health and prevent diseases such as diabetes and heart disease.  · Increase your consumption of nuts, legumes, and seeds to 4-5 servings per week. One serving of dried beans or legumes equals ½ cup after being cooked, one serving of nuts equals 1½ ounces, and one serving of seeds equals ½ ounce or 1 tablespoon.  · You may need to monitor your salt (sodium) intake, especially if you have high blood pressure. Talk with your health care provider or dietitian to get more information about reducing sodium.  What foods can I eat?  Grains    Breads, including Swedish, white, carmen, wheat, raisin, rye, oatmeal, and Italian. Tortillas that are neither fried nor made with lard or trans fat. Low-fat rolls, including hotdog and hamburger buns and English muffins. Biscuits. Muffins. Waffles. Pancakes. Light popcorn. Whole-grain cereals. Flatbread. Karely toast. Pretzels. Breadsticks. Rusks. Low-fat snacks and crackers, including oyster, saltine, matzo, paul, animal, and rye. Rice and pasta, including brown rice and those that are made with whole wheat.  Vegetables  All vegetables.  Fruits  All fruits, but limit coconut.  Meats and Other Protein Sources  Lean, well-trimmed beef, veal, pork, and lamb. Chicken and turkey without skin. All fish and shellfish. Wild duck, rabbit, pheasant, and  venison. Egg whites or low-cholesterol egg substitutes. Dried beans, peas, lentils, and tofu. Seeds and most nuts.  Dairy  Low-fat or nonfat cheeses, including ricotta, string, and mozzarella. Skim or 1% milk that is liquid, powdered, or evaporated. Buttermilk that is made with low-fat milk. Nonfat or low-fat yogurt.  Beverages  Mineral water. Diet carbonated beverages.  Sweets and Desserts  Sherbets and fruit ices. Honey, jam, marmalade, jelly, and syrups. Meringues and gelatins. Pure sugar candy, such as hard candy, jelly beans, gumdrops, mints, marshmallows, and small amounts of dark chocolate. Brett food cake.  Eat all sweets and desserts in moderation.  Fats and Oils  Nonhydrogenated (trans-free) margarines. Vegetable oils, including soybean, sesame, sunflower, olive, peanut, safflower, corn, canola, and cottonseed. Salad dressings or mayonnaise that are made with a vegetable oil. Limit added fats and oils that you use for cooking, baking, salads, and as spreads.  Other  Cocoa powder. Coffee and tea. All seasonings and condiments.  The items listed above may not be a complete list of recommended foods or beverages. Contact your dietitian for more options.  What foods are not recommended?  Grains  Breads that are made with saturated or trans fats, oils, or whole milk. Croissants. Butter rolls. Cheese breads. Sweet rolls. Donuts. Buttered popcorn. Chow mein noodles. High-fat crackers, such as cheese or butter crackers.  Meats and Other Protein Sources  Fatty meats, such as hotdogs, short ribs, sausage, spareribs, khanna, ribeye roast or steak, and mutton. High-fat deli meats, such as salami and bologna. Caviar. Domestic duck and goose. Organ meats, such as kidney, liver, sweetbreads, brains, gizzard, chitterlings, and heart.  Dairy  Cream, sour cream, cream cheese, and creamed cottage cheese. Whole milk cheeses, including blue (nancy), [place] Rafiq, Brie, Reji, American, Havarti, Swiss, cheddar, Camembert, and  Tulia. Whole or 2% milk that is liquid, evaporated, or condensed. Whole buttermilk. Cream sauce or high-fat cheese sauce. Yogurt that is made from whole milk.  Beverages  Regular sodas and drinks with added sugar.  Sweets and Desserts  Frosting. Pudding. Cookies. Cakes other than bg food cake. Candy that has milk chocolate or white chocolate, hydrogenated fat, butter, coconut, or unknown ingredients. Buttered syrups. Full-fat ice cream or ice cream drinks.  Fats and Oils  Gravy that has suet, meat fat, or shortening. Cocoa butter, hydrogenated oils, palm oil, coconut oil, palm kernel oil. These can often be found in baked products, candy, fried foods, nondairy creamers, and whipped toppings. Solid fats and shortenings, including khanna fat, salt pork, lard, and butter. Nondairy cream substitutes, such as coffee creamers and sour cream substitutes. Salad dressings that are made of unknown oils, cheese, or sour cream.  The items listed above may not be a complete list of foods and beverages to avoid. Contact your dietitian for more information.  This information is not intended to replace advice given to you by your health care provider. Make sure you discuss any questions you have with your health care provider.  Document Released: 09/26/2009 Document Revised: 07/07/2017 Document Reviewed: 06/11/2015  [employer] Interactive Patient Education © 2018 [employer]    Exercising to Stay Healthy  Exercising regularly is important. It has many health benefits, such as:  · Improving your overall fitness, flexibility, and endurance.  · Increasing your bone density.  · Helping with weight control.  · Decreasing your body fat.  · Increasing your muscle strength.  · Reducing stress and tension.  · Improving your overall health.    In order to become healthy and stay healthy, it is recommended that you do moderate-intensity and vigorous-intensity exercise. You can tell that you are exercising at a moderate intensity if you  have a higher heart rate and faster breathing, but you are still able to hold a conversation. You can tell that you are exercising at a vigorous intensity if you are breathing much harder and faster and cannot hold a conversation while exercising.  How often should I exercise?  Choose an activity that you enjoy and set realistic goals. Your health care provider can help you to make an activity plan that works for you. Exercise regularly as directed by your health care provider. This may include:  · Doing resistance training twice each week, such as:  ? Push-ups.  ? Sit-ups.  ? Lifting weights.  ? Using resistance bands.  · Doing a given intensity of exercise for a given amount of time. Choose from these options:  ? 150 minutes of moderate-intensity exercise every week.  ? 75 minutes of vigorous-intensity exercise every week.  ? A mix of moderate-intensity and vigorous-intensity exercise every week.    Children, pregnant women, people who are out of shape, people who are overweight, and older adults may need to consult a health care provider for individual recommendations. If you have any sort of medical condition, be sure to consult your health care provider before starting a new exercise program.  What are some exercise ideas?  Some moderate-intensity exercise ideas include:  · Walking at a rate of 1 mile in 15 minutes.  · Biking.  · Hiking.  · Golfing.  · Dancing.    Some vigorous-intensity exercise ideas include:  · Walking at a rate of at least 4.5 miles per hour.  · Jogging or running at a rate of 5 miles per hour.  · Biking at a rate of at least 10 miles per hour.  · Lap swimming.  · Roller-skating or in-line skating.  · Cross-country skiing.  · Vigorous competitive sports, such as football, basketball, and soccer.  · Jumping rope.  · Aerobic dancing.    What are some everyday activities that can help me to get exercise?  · Yard work, such as:  ? Pushing a .  ? Raking and bagging leaves.  · Washing  and waxing your car.  · Pushing a stroller.  · Shoveling snow.  · Gardening.  · Washing windows or floors.  How can I be more active in my day-to-day activities?  · Use the stairs instead of the elevator.  · Take a walk during your lunch break.  · If you drive, park your car farther away from work or school.  · If you take public transportation, get off one stop early and walk the rest of the way.  · Make all of your phone calls while standing up and walking around.  · Get up, stretch, and walk around every 30 minutes throughout the day.  What guidelines should I follow while exercising?  · Do not exercise so much that you hurt yourself, feel dizzy, or get very short of breath.  · Consult your health care provider before starting a new exercise program.  · Wear comfortable clothes and shoes with good support.  · Drink plenty of water while you exercise to prevent dehydration or heat stroke. Body water is lost during exercise and must be replaced.  · Work out until you breathe faster and your heart beats faster.  This information is not intended to replace advice given to you by your health care provider. Make sure you discuss any questions you have with your health care provider.  Document Released: 01/20/2012 Document Revised: 05/25/2017 Document Reviewed: 05/21/2015  [employer] Interactive Patient Education © 2018 [employer]        Orders Placed This Encounter   Procedures   • ALICIA With / DsDNA, RNP, Sjogrens A / B, [place]     Standing Status:   Future     Number of Occurrences:   1   • Lipid Panel     Standing Status:   Future     Number of Occurrences:   1   • Urinalysis With Microscopic If Indicated (No Culture) - Urine, Clean Catch     Standing Status:   Future     Number of Occurrences:   1     Standing Expiration Date:   2/7/2020   • Comprehensive metabolic panel     Standing Status:   Future     Number of Occurrences:   1     Standing Expiration Date:   2/7/2020   • TSH     Standing Status:   Future      Number of Occurrences:   1   • Vitamin B12     Standing Status:   Future     Number of Occurrences:   1     Standing Expiration Date:   2/7/2020   • Folate     Standing Status:   Future     Number of Occurrences:   1     Standing Expiration Date:   2/7/2020   • CBC Auto Differential   • Ambulatory referral for Screening EGD     Referral Priority:   Routine     Referral Type:   Diagnostic Medical     Referral Reason:   Specialty Services Required     Number of Visits Requested:   1   • CBC & Differential     Standing Status:   Future     Number of Occurrences:   1     Order Specific Question:   Manual Differential     Answer:   No       Outpatient Encounter Medications as of 2/7/2019   Medication Sig Dispense Refill   • hydrocortisone 2.5 % cream      • lisinopril (PRINIVIL,ZESTRIL) 5 MG tablet Take 1 tablet by mouth Daily. 90 tablet 1   • pantoprazole (PROTONIX) 40 MG EC tablet Take 1 tablet by mouth Daily. 90 tablet 1   • [DISCONTINUED] hydrochlorothiazide (HYDRODIURIL) 25 MG tablet TAKE 1 TABLET BY MOUTH  EVERY DAY 90 tablet 0     No facility-administered encounter medications on file as of 2/7/2019.        Reviewed use of high risk medication in the elderly: not applicable  Reviewed for potential of harmful drug interactions in the elderly: not applicable    Follow Up:  Return in about 6 weeks (around 3/21/2019) for Recheck BP, labs today.     An After Visit Summary and PPPS with all of these plans were given to the patient.

## 2019-02-08 LAB
ANA SER QL: NEGATIVE
VIT B12 BLD-MCNC: 401 PG/ML (ref 211–911)

## 2019-03-06 ENCOUNTER — TELEPHONE (OUTPATIENT)
Dept: INTERNAL MEDICINE | Facility: CLINIC | Age: 44
End: 2019-03-06

## 2019-03-06 NOTE — TELEPHONE ENCOUNTER
EGD ORDER PLACED ON 02/07/19 FOR [facility].  [facility]STRO HAS BEEN TRYING TO CALL THE PT WITH NO RETURN CALL.  I HAVE ALSO CALLED THE PT AND SPOKEN WITH HIM, GAVE HIM THE NUMBER TO CALL TO SCHEDULE THIS APPT AND TO THIS DAY PT HAS STILL NOT DONE SO.  WHAT DO YOU WANT TO DO WITH THIS ORDER?

## 2019-03-14 ENCOUNTER — TELEPHONE (OUTPATIENT)
Dept: INTERNAL MEDICINE | Facility: CLINIC | Age: 44
End: 2019-03-14

## 2019-03-14 DIAGNOSIS — I10 BENIGN ESSENTIAL HYPERTENSION: Primary | ICD-10-CM

## 2019-03-14 RX ORDER — LISINOPRIL AND HYDROCHLOROTHIAZIDE 12.5; 1 MG/1; MG/1
1 TABLET ORAL DAILY
Qty: 30 TABLET | Refills: 5 | Status: SHIPPED | OUTPATIENT
Start: 2019-03-14 | End: 2019-03-14 | Stop reason: SDUPTHER

## 2019-03-14 RX ORDER — LISINOPRIL AND HYDROCHLOROTHIAZIDE 12.5; 1 MG/1; MG/1
1 TABLET ORAL DAILY
Qty: 30 TABLET | Refills: 5 | Status: SHIPPED | OUTPATIENT
Start: 2019-03-14 | End: 2019-06-25 | Stop reason: SDUPTHER

## 2019-03-14 NOTE — TELEPHONE ENCOUNTER
Called mail order and cancelled the RX. Called pt and LM for him to call back to advise per Dr. Anna.

## 2019-03-14 NOTE — TELEPHONE ENCOUNTER
Stop lisinopril 5mg tablet.  Start lisinopril/HCTZ 10/12.5mg daily. I sent mistakenly to mail order ifrst, please cancel that. I resent it to Kashif.

## 2019-03-14 NOTE — TELEPHONE ENCOUNTER
PATIENT CALLED AND STATED THAT HIS BLOOD PRESSURE HAS BEEN RUNNING HIGH SINCE BEING ON THE LISINOPRIL    HIS READINGS HAVE BEEN IN /100'S BUT HE HAS NOT BEEN KEEPING A LOG    PLEASE ADVISE

## 2019-06-18 DIAGNOSIS — I10 BENIGN ESSENTIAL HYPERTENSION: ICD-10-CM

## 2019-06-18 RX ORDER — LISINOPRIL 5 MG/1
5 TABLET ORAL DAILY
Qty: 90 TABLET | Refills: 1 | OUTPATIENT
Start: 2019-06-18

## 2019-06-24 DIAGNOSIS — I10 BENIGN ESSENTIAL HYPERTENSION: ICD-10-CM

## 2019-06-24 RX ORDER — LISINOPRIL 5 MG/1
5 TABLET ORAL DAILY
Qty: 90 TABLET | Refills: 1 | OUTPATIENT
Start: 2019-06-24

## 2019-06-25 ENCOUNTER — TELEPHONE (OUTPATIENT)
Dept: INTERNAL MEDICINE | Facility: CLINIC | Age: 44
End: 2019-06-25

## 2019-06-25 DIAGNOSIS — I10 BENIGN ESSENTIAL HYPERTENSION: ICD-10-CM

## 2019-06-25 RX ORDER — LISINOPRIL AND HYDROCHLOROTHIAZIDE 12.5; 1 MG/1; MG/1
1 TABLET ORAL DAILY
Qty: 30 TABLET | Refills: 5 | Status: SHIPPED | OUTPATIENT
Start: 2019-06-25 | End: 2019-10-15 | Stop reason: SDUPTHER

## 2019-06-25 NOTE — TELEPHONE ENCOUNTER
Spoke with pt. On 3/14/19 we called the pt and left 2 messages for him to call us back because Dr. Anna was changing his Lisinopril dose. Pt never returned our call. I advised him of this and he verbalized understanding. I called pharmacy and asked them to fill that for the pt. Pt advised to let us know if his BP is still out of control. Pt verbalized understanding.

## 2019-06-25 NOTE — TELEPHONE ENCOUNTER
PT CALLED BACK WANTING TO KNOW WHY HIS LISINOPRIL 5 MG WAS DENIED   AND ALSO HE STATES HE THINKS HE NEEDS A HIGHER DOSE BECAUSE HIS BP RUNS 140-145/90 CONSISTANTLY    I CALLED TO INFORM HIM THAT IT IS TO EARLY TO REFILL LVM TO RETURN CALL     PT CALLED BACK I INFORMED HIM THAT LISINOPRIL-HCTZ  10-12.5 MG  WAS SENT IN TO [facility] ON 3/14/19 . HE STATED HE DIDN'T KNOW YOU HAD SENT IT AND HE THOUGHT YOU HAD TOLD HIM HE SHOULDN'T TAKE IT THAT WAY IT SHOULD BE ONE OR THE OTHER NOT BOTH TOGETHER     HE STATED NO WONDER HIS BP HAS BEEN RUNNING HIGH IF HE WAS SUPPOSED TO BE TAKING THE HIGHER DOSAGE AND HASN'T BEEN     PLEASE ADVISE AND IF HE IS SUPPOSED TO BE ON THRE HIGHER DOSE IT NEEDS TO BE SENT TO [facility]

## 2019-10-15 ENCOUNTER — OFFICE VISIT (OUTPATIENT)
Dept: INTERNAL MEDICINE | Facility: CLINIC | Age: 44
End: 2019-10-15

## 2019-10-15 VITALS
DIASTOLIC BLOOD PRESSURE: 80 MMHG | SYSTOLIC BLOOD PRESSURE: 122 MMHG | OXYGEN SATURATION: 90 % | WEIGHT: 210 LBS | BODY MASS INDEX: 27.83 KG/M2 | HEIGHT: 73 IN | TEMPERATURE: 98.2 F | HEART RATE: 58 BPM

## 2019-10-15 DIAGNOSIS — J02.9 SORE THROAT: Primary | ICD-10-CM

## 2019-10-15 DIAGNOSIS — I10 BENIGN ESSENTIAL HYPERTENSION: ICD-10-CM

## 2019-10-15 LAB
EXPIRATION DATE: NORMAL
INTERNAL CONTROL: NORMAL
Lab: NORMAL
S PYO RRNA THROAT QL PROBE: NEGATIVE

## 2019-10-15 PROCEDURE — 87651 STREP A DNA AMP PROBE: CPT | Performed by: PHYSICIAN ASSISTANT

## 2019-10-15 PROCEDURE — 99213 OFFICE O/P EST LOW 20 MIN: CPT | Performed by: PHYSICIAN ASSISTANT

## 2019-10-15 RX ORDER — AZITHROMYCIN 250 MG/1
TABLET, FILM COATED ORAL
Qty: 6 TABLET | Refills: 0 | Status: SHIPPED | OUTPATIENT
Start: 2019-10-15 | End: 2020-04-10

## 2019-10-15 RX ORDER — LISINOPRIL AND HYDROCHLOROTHIAZIDE 12.5; 1 MG/1; MG/1
1 TABLET ORAL DAILY
Qty: 90 TABLET | Refills: 1 | Status: SHIPPED | OUTPATIENT
Start: 2019-10-15 | End: 2020-03-02 | Stop reason: SDUPTHER

## 2019-10-15 RX ORDER — IBUPROFEN 600 MG/1
600 TABLET ORAL EVERY 6 HOURS PRN
Qty: 30 TABLET | Refills: 0 | Status: SHIPPED | OUTPATIENT
Start: 2019-10-15

## 2019-10-15 NOTE — PROGRESS NOTES
Patient Care Team:  Sherri Anna MD as PCP - General (Internal Medicine)    Chief Complaint::   Chief Complaint   Patient presents with   • Sore Throat     Started this AM   • Earache     R ear   • Chills        Subjective     HPI  Sore Throat    This is a new problem. The current episode started in the past 7 days. The problem has been gradually worsening. There has been no fever. The pain is at a severity of 5/10. The pain is moderate. Associated symptoms include headaches and trouble swallowing. Pertinent negatives include no abdominal pain, congestion, coughing, ear pain or shortness of breath. He has had exposure to strep. He has tried gargles for the symptoms. The treatment provided no relief.       PMH  The following portions of the patient's history were reviewed and updated as appropriate: allergies, current medications, past medical history, and problem list.    Review of Systems:   Review of Systems   Constitutional: Positive for chills and fatigue. Negative for fever.   HENT: Positive for sore throat, trouble swallowing and voice change. Negative for congestion, ear pain and sinus pressure.    Respiratory: Negative for apnea, cough, chest tightness, shortness of breath and wheezing.    Cardiovascular: Negative for chest pain and palpitations.   Gastrointestinal: Negative for abdominal pain, blood in stool and constipation.   Endocrine: Negative for cold intolerance and heat intolerance.   Skin: Negative.  Negative for color change.   Allergic/Immunologic: Negative for environmental allergies.   Neurological: Negative for dizziness, speech difficulty and headache.   Hematological: Negative for adenopathy. Does not bruise/bleed easily.   Psychiatric/Behavioral: Negative for decreased concentration. The patient is not nervous/anxious.        Vital Signs  Vitals:    10/15/19 1316   BP: 122/80   BP Location: Left arm   Patient Position: Sitting   Cuff Size: Adult   Pulse: 58   Temp: 98.2 °F (36.8 °C)  "  TempSrc: Temporal   SpO2: 90%   Weight: 95.3 kg (210 lb)   Height: 185.4 cm (73\")   PainSc:   5   PainLoc: Throat     Body mass index is 27.71 kg/m².    Labs  Office Visit on 10/15/2019   Component Date Value Ref Range Status   • POC Strep A, Molecular 10/15/2019 Negative  Negative Final   • Internal Control 10/15/2019 Passed  Passed Final   • Lot Number 10/15/2019 [id number]   Final   • Expiration Date 10/15/2019 [id number]   Final       Imaging  No radiology results for the last 30 days.      Current Outpatient Medications:   •  hydrocortisone 2.5 % cream, , Disp: , Rfl:   •  lisinopril-hydrochlorothiazide (ZESTORETIC) 10-12.5 MG per tablet, Take 1 tablet by mouth Daily., Disp: 90 tablet, Rfl: 1  •  pantoprazole (PROTONIX) 40 MG EC tablet, Take 1 tablet by mouth Daily., Disp: 90 tablet, Rfl: 1  •  azithromycin (ZITHROMAX Z-ULICES) 250 MG tablet, Take 2 tablets the first day, then 1 tablet daily for 4 days., Disp: 6 tablet, Rfl: 0  •  ibuprofen (ADVIL,MOTRIN) 600 MG tablet, Take 1 tablet by mouth Every 6 (Six) Hours As Needed for Mild Pain ., Disp: 30 tablet, Rfl: 0    Physical Exam:    Physical Exam   Constitutional: He appears well-developed and well-nourished. No distress.   HENT:   Head: Normocephalic and atraumatic.   Right Ear: External ear normal.   Left Ear: External ear normal.   Nose: Nose normal.   Mouth/Throat: Posterior oropharyngeal erythema present. No oropharyngeal exudate, posterior oropharyngeal edema or tonsillar abscesses. Tonsils are 2+ on the right. Tonsils are 2+ on the left. No tonsillar exudate.   Eyes: Conjunctivae and EOM are normal. Pupils are equal, round, and reactive to light.   Neck: Normal range of motion. Neck supple.   Cardiovascular: Normal rate, regular rhythm and normal heart sounds.   Pulmonary/Chest: Effort normal and breath sounds normal.   Lymphadenopathy:     He has cervical adenopathy.   Skin: Skin is warm and dry. He is not diaphoretic.   Psychiatric: He has a normal mood and " affect. His behavior is normal. Judgment and thought content normal.   Nursing note and vitals reviewed.      Procedures        Assessment/Plan   Problem List Items Addressed This Visit        Cardiovascular and Mediastinum    Benign essential hypertension    Relevant Medications    lisinopril-hydrochlorothiazide (ZESTORETIC) 10-12.5 MG per tablet      Other Visit Diagnoses     Sore throat    -  Primary    Relevant Medications    ibuprofen (ADVIL,MOTRIN) 600 MG tablet    azithromycin (ZITHROMAX Z-ULICES) 250 MG tablet    Other Relevant Orders    POCT Strep A, molecular (Completed)      Continue salt water gargle discussed.  Start Zithromax this evening.  Ibuprofen 600 mg every 6-8 hours as needed for pain.  Continue to alternate between hot and cold liquids.  Return if symptoms worsen or fail to improve.    Plan of care reviewed with patient at the conclusion of today's visit. Education was provided regarding diagnosis, management, and any prescribed or recommended OTC medications.Patient verbalizes understanding of and agreement with management plan.     Marry Schmitz PA-C

## 2020-01-29 RX ORDER — FINASTERIDE 1 MG/1
TABLET, FILM COATED ORAL
Qty: 30 TABLET | Refills: 0 | Status: SHIPPED | OUTPATIENT
Start: 2020-01-29 | End: 2020-11-13

## 2020-03-02 DIAGNOSIS — I10 BENIGN ESSENTIAL HYPERTENSION: ICD-10-CM

## 2020-03-02 RX ORDER — LISINOPRIL AND HYDROCHLOROTHIAZIDE 12.5; 1 MG/1; MG/1
1 TABLET ORAL DAILY
Qty: 90 TABLET | Refills: 1 | Status: SHIPPED | OUTPATIENT
Start: 2020-03-02 | End: 2020-07-10 | Stop reason: SDUPTHER

## 2020-04-09 ENCOUNTER — TELEPHONE (OUTPATIENT)
Dept: INTERNAL MEDICINE | Facility: CLINIC | Age: 45
End: 2020-04-09

## 2020-04-09 NOTE — TELEPHONE ENCOUNTER
PT WOULD LIKE FOR DR. YOUNG TO GIVE HIM AN CALLBACK. HE WOULD LIKE TO DISCUSS SOME MEDICAL ISSUE.PT HAS A SWOLLEN LEFT TESTICLE AND IS CONCERNED ABOUT IT. PT ADVISED THAT IT HURTS, AND ADVISED THAT HE IS NOT GOING TO THE ER UNLESS HE IS INSTRUCTED.  PLEASE ADVISE     CALLBACK # [phone]

## 2020-04-10 ENCOUNTER — OFFICE VISIT (OUTPATIENT)
Dept: INTERNAL MEDICINE | Facility: CLINIC | Age: 45
End: 2020-04-10

## 2020-04-10 DIAGNOSIS — I10 BENIGN ESSENTIAL HYPERTENSION: ICD-10-CM

## 2020-04-10 DIAGNOSIS — K21.9 GASTROESOPHAGEAL REFLUX DISEASE WITHOUT ESOPHAGITIS: ICD-10-CM

## 2020-04-10 DIAGNOSIS — N50.82 ACUTE PAIN IN SCROTUM: Primary | ICD-10-CM

## 2020-04-10 DIAGNOSIS — N50.89 SCROTAL SWELLING: ICD-10-CM

## 2020-04-10 PROCEDURE — 99213 OFFICE O/P EST LOW 20 MIN: CPT | Performed by: INTERNAL MEDICINE

## 2020-04-10 NOTE — PROGRESS NOTES
[facility]     Colin Cheema  1975   [id number]      Patient Care Team:  Sherri Anna MD as PCP - General (Internal Medicine)    You have chosen to receive care through a telephone visit today. Do you consent to use a telephone visit for your medical care today? Yes    CC: scrotum pain and swelling and tenderness    HPI  Patient is a 44 y.o. male presents with scrotal swelling and pain. The area that is most swollen and tender is distalOnset of symptoms was abrupt starting 3 days ago.  Chronicity acute. Severity moderate .  Symptoms are associated with tenderness, pain radiates up toward pelvis . Pertinent negatives no bruising,fever,chills,redness,no varicose veins visible.   Symptoms are aggravated by tight pants, sitting.   Symptoms improve with- pain and swelling are improving spontaneously over last couple days.  Context no known trauma . Has been doing lots of yard work and lifting lately. Has been sitting at computer working 12 hours a day for the last week.  Quality aching and tender. He asks if it would be ok to take ibuprofen as needed.      CHRONIC CONDITIONS  GERD usually controlled pretty well on pantoprazole. Eating earlier and avoiding certain foods helps.    BP's well controlled on med.    Past Medical History:   Diagnosis Date   • Allergic rhinitis    • GERD (gastroesophageal reflux disease)    • Hyperlipidemia    • Hypertension    • Low back pain    • Numbness and tingling in left arm 12/29/2015   • Piriformis syndrome, left 05/06/2016   • Plantar fasciitis 02/12/2016    left foot   • Vitamin B 12 deficiency        Past Surgical History:   Procedure Laterality Date   • HERNIA REPAIR Bilateral 2001    Inguinal       Family History   Problem Relation Age of Onset   • Testicular cancer Father    • Colon cancer Other        Social History     Socioeconomic History   • Marital status:      Spouse name: Not on file   • Number of children: Not on file   • Years of  education: Not on file   • Highest education level: Not on file   Tobacco Use   • Smoking status: Never Smoker   • Smokeless tobacco: Never Used   Substance and Sexual Activity   • Alcohol use: Yes     Frequency: 2-3 times a week     Drinks per session: 1 or 2     Binge frequency: Never     Comment: socially       Allergies   Allergen Reactions   • Loratadine Unknown (See Comments)     Unknown reaction       Review of Systems:     Review of Systems   Constitutional: Negative for chills, fatigue and fever.   HENT: Negative for congestion, ear pain and sinus pressure.    Respiratory: Negative for cough, chest tightness, shortness of breath and wheezing.    Cardiovascular: Negative for chest pain, palpitations and leg swelling.   Gastrointestinal: Negative for abdominal pain, blood in stool and constipation.   Genitourinary: Positive for scrotal swelling and testicular pain. Negative for discharge, dysuria, frequency, hematuria, penile pain, erectile dysfunction, penile swelling and urgency.   Skin: Negative for color change.   Allergic/Immunologic: Negative for environmental allergies.   Neurological: Negative for dizziness, speech difficulty and headache.   Psychiatric/Behavioral: Negative for decreased concentration. The patient is not nervous/anxious.        Vital Signs  There were no vitals filed for this visit.  There is no height or weight on file to calculate BMI.      Current Outpatient Medications:   •  finasteride (PROPECIA) 1 MG tablet, TAKE ONE TABLET BY MOUTH DAILY, Disp: 30 tablet, Rfl: 0  •  hydrocortisone 2.5 % cream, , Disp: , Rfl:   •  lisinopril-hydrochlorothiazide (ZESTORETIC) 10-12.5 MG per tablet, Take 1 tablet by mouth Daily., Disp: 90 tablet, Rfl: 1  •  pantoprazole (PROTONIX) 40 MG EC tablet, Take 1 tablet by mouth Daily., Disp: 90 tablet, Rfl: 1  •  ibuprofen (ADVIL,MOTRIN) 600 MG tablet, Take 1 tablet by mouth Every 6 (Six) Hours As Needed for Mild Pain ., Disp: 30 tablet, Rfl: 0    Physical  Exam:    Physical Exam   Constitutional: No distress.   Psychiatric: He has a normal mood and affect. His speech is normal and behavior is normal. Judgment and thought content normal.        ACE III MINI        Results Review:    None    CMP:  Lab Results   Component Value Date    BUN 19 02/07/2019    CREATININE 1.07 02/07/2019    EGFRIFNONA 75 02/07/2019    BCR 17.8 02/07/2019     02/07/2019    K 4.1 02/07/2019    CO2 30.0 02/07/2019    CALCIUM 9.9 02/07/2019    ALBUMIN 4.86 (H) 02/07/2019    BILITOT 1.1 02/07/2019    ALKPHOS 47 02/07/2019    AST 27 02/07/2019    ALT 40 02/07/2019     HbA1c:  No results found for: HGBA1C  Microalbumin:  No results found for: MICROALBUR, POCMALB, POCCREAT  Lipid Panel  Lab Results   Component Value Date    CHOL 174 02/07/2019    TRIG 166 (H) 02/07/2019    HDL 37 (L) 02/07/2019     02/07/2019    AST 27 02/07/2019    ALT 40 02/07/2019       Medication Review: Medications reviewed and noted  Patient Instructions   Problem List Items Addressed This Visit        Cardiovascular and Mediastinum    Benign essential hypertension    Overview     4/10/2020 Sherri Anna MD    Continue lisinopril/hydrochlorothiazide daily. Continue to avoid salt in the diet and avoid sodas.         Relevant Medications    lisinopril-hydrochlorothiazide (ZESTORETIC) 10-12.5 MG per tablet       Digestive    Gastroesophageal reflux disease without esophagitis    Overview     4/10/2020 Sherri Anna MD    Continue pantoprazole daily.  May take an extra one in the evening on days need to take ibuprofen.  Always take ibuprofen with food.    Avoid eating close to bedtime.  Avoid large meals.         Relevant Medications    pantoprazole (PROTONIX) 40 MG EC tablet      Other Visit Diagnoses     Acute pain in scrotum    -  Primary    Symptoms and description by patient consistent with acute epididymitis.Possible injury due to extended pressure sitting at computer 12h/day.    Scrotal swelling         See above.Since improving spontaneously,wear loose fitting pants,avoid sitting for so long without breaks,take ibuprofen as needed.Let us know if not improving.             Diagnosis Plan   1. Acute pain in scrotum      Symptoms and description by patient consistent with acute epididymitis.Possible injury due to extended pressure sitting at computer 12h/day.   2. Scrotal swelling      See above.Since improving spontaneously,wear loose fitting pants,avoid sitting for so long without breaks,take ibuprofen as needed.Let us know if not improving.   3. Benign essential hypertension     4. Gastroesophageal reflux disease without esophagitis         Plan of care reviewed with patient at the conclusion of today's visit. Education was provided regarding diagnosis, management, and any prescribed or recommended OTC medications.Patient verbalizes understanding of and agreement with management plan.      This visit has been rescheduled as a phone visit to comply with patient safety concerns in accordance with CDC recommendations. Total time of discussion was 20 minutes.        Sherri Anna MD

## 2020-04-10 NOTE — PATIENT INSTRUCTIONS
Patient Instructions   Problem List Items Addressed This Visit        Cardiovascular and Mediastinum    Benign essential hypertension    Overview     4/10/2020 Sherri Anna MD    Continue lisinopril/hydrochlorothiazide daily. Continue to avoid salt in the diet and avoid sodas.         Relevant Medications    lisinopril-hydrochlorothiazide (ZESTORETIC) 10-12.5 MG per tablet       Digestive    Gastroesophageal reflux disease without esophagitis    Overview     4/10/2020 Sherri Anna MD    Continue pantoprazole daily.  May take an extra one in the evening on days need to take ibuprofen.  Always take ibuprofen with food.    Avoid eating close to bedtime.  Avoid large meals.         Relevant Medications    pantoprazole (PROTONIX) 40 MG EC tablet      Other Visit Diagnoses     Acute pain in scrotum    -  Primary    Symptoms and description by patient consistent with acute epididymitis.Possible injury due to extended pressure sitting at computer 12h/day.    Scrotal swelling        See above.Since improving spontaneously,wear loose fitting pants,avoid sitting for so long without breaks,take ibuprofen as needed.Let us know if not improving.

## 2020-04-10 NOTE — TELEPHONE ENCOUNTER
Set up for video visit today-reassure him that does not mean he has to undress for the video visit.

## 2020-04-10 NOTE — TELEPHONE ENCOUNTER
PT REFUSED THE VIDEO.  PT STATED THAT HE DOESN'T HAVE TIME TO DO DOWNLOAD THE FIGUEROA.  MADE A TELEPHONE VISIT INSTEAD.

## 2020-07-10 ENCOUNTER — OFFICE VISIT (OUTPATIENT)
Dept: INTERNAL MEDICINE | Facility: CLINIC | Age: 45
End: 2020-07-10

## 2020-07-10 VITALS
DIASTOLIC BLOOD PRESSURE: 84 MMHG | WEIGHT: 216.8 LBS | TEMPERATURE: 98.2 F | HEART RATE: 60 BPM | HEIGHT: 73 IN | BODY MASS INDEX: 28.73 KG/M2 | SYSTOLIC BLOOD PRESSURE: 138 MMHG

## 2020-07-10 DIAGNOSIS — F43.9 STRESS: Chronic | ICD-10-CM

## 2020-07-10 DIAGNOSIS — K21.9 GASTROESOPHAGEAL REFLUX DISEASE WITHOUT ESOPHAGITIS: ICD-10-CM

## 2020-07-10 DIAGNOSIS — Z11.59 ENCOUNTER FOR HEPATITIS C SCREENING TEST FOR LOW RISK PATIENT: ICD-10-CM

## 2020-07-10 DIAGNOSIS — E78.2 MIXED HYPERLIPIDEMIA: ICD-10-CM

## 2020-07-10 DIAGNOSIS — Z00.00 ANNUAL PHYSICAL EXAM: Primary | ICD-10-CM

## 2020-07-10 DIAGNOSIS — I10 BENIGN ESSENTIAL HYPERTENSION: ICD-10-CM

## 2020-07-10 PROCEDURE — 99214 OFFICE O/P EST MOD 30 MIN: CPT | Performed by: INTERNAL MEDICINE

## 2020-07-10 RX ORDER — PANTOPRAZOLE SODIUM 40 MG/1
40 TABLET, DELAYED RELEASE ORAL AS NEEDED
Qty: 30 TABLET | Refills: 5
Start: 2020-07-10 | End: 2021-01-12 | Stop reason: SDUPTHER

## 2020-07-10 RX ORDER — LISINOPRIL AND HYDROCHLOROTHIAZIDE 12.5; 1 MG/1; MG/1
1 TABLET ORAL DAILY
Qty: 90 TABLET | Refills: 1 | Status: SHIPPED | OUTPATIENT
Start: 2020-07-10 | End: 2021-01-12 | Stop reason: SDUPTHER

## 2020-07-10 NOTE — PROGRESS NOTES
QUICK REFERENCE INFORMATION:  The ABCs of the Annual Wellness Visit    Annual Wellness visit    HEALTH RISK ASSESSMENT    1975    Recent History  No hospitalization(s) within the last year..        Current Medical Providers:  Patient Care Team:  Sherri Anna MD as PCP - Internal Medicine (Internal Medicine)        Smoking Status:  Social History     Tobacco Use   Smoking Status Never Smoker   Smokeless Tobacco Never Used       Alcohol Consumption:  Social History     Substance and Sexual Activity   Alcohol Use Yes   • Frequency: 2-3 times a week   • Drinks per session: 1 or 2   • Binge frequency: Never    Comment: socially       Depression Screen:   PHQ-2/PHQ-9 Depression Screening 7/10/2020   Little interest or pleasure in doing things 1   Feeling down, depressed, or hopeless 1   Trouble falling or staying asleep, or sleeping too much 0   Feeling tired or having little energy 1   Poor appetite or overeating 1   Feeling bad about yourself - or that you are a failure or have let yourself or your family down 0   Trouble concentrating on things, such as reading the newspaper or watching television 0   Moving or speaking so slowly that other people could have noticed. Or the opposite - being so fidgety or restless that you have been moving around a lot more than usual 0   Thoughts that you would be better off dead, or of hurting yourself in some way 0   Total Score 4   If you checked off any problems, how difficult have these problems made it for you to do your work, take care of things at home, or get along with other people? Somewhat difficult     Above answers reflect stress with busy lifestyle raising children and doing baseball in light of the current COVID-19 pandemic.    Health Habits:              Recent Lab Results:  CMP:  Lab Results   Component Value Date    BUN 19 02/07/2019    CREATININE 1.07 02/07/2019    EGFRIFNONA 75 02/07/2019    BCR 17.8 02/07/2019     02/07/2019    K 4.1 02/07/2019     CO2 30.0 02/07/2019    CALCIUM 9.9 02/07/2019    ALBUMIN 4.86 (H) 02/07/2019    BILITOT 1.1 02/07/2019    ALKPHOS 47 02/07/2019    AST 27 02/07/2019    ALT 40 02/07/2019     Lipid Panel:  Lab Results   Component Value Date    CHOL 174 02/07/2019    TRIG 166 (H) 02/07/2019    HDL 37 (L) 02/07/2019     HbA1c:           Age-appropriate Screening Schedule:  Refer to the list below for future screening recommendations based on patient's age, sex and/or medical conditions. Orders for these recommended tests are listed in the plan section. The patient has been provided with a written plan.    Age appropriate preventive counseling done including age appropriate vaccines, colonoscopy, regular dental visits, mental health, injury prevention such as wearing seat belt and preventing falls, healthy  nutrition, healthy weight, regular physical exercise. Alcohol use is moderate.  Tobacco history-none. Drug use-none.  STD's-not at risk.          Health Maintenance   Topic Date Due   • COLONOSCOPY  01/04/2019   • LIPID PANEL  02/07/2020   • INFLUENZA VACCINE  08/01/2020   • TDAP/TD VACCINES (2 - Td) 12/29/2025        Subjective   History of Present Illness    Colin Cheema is a 45 y.o. male who presents for an Annual Wellness Visit and follow up chronic conditions including hypertension and hyperlipidemia.      CHRONIC CONDITIONS  BP's at home  125-135 /82-85. Takes med daily. No side effects. Had been exercising a lot and had lost some weight till recent vacation. Usually eating low fat diet.     GERD controlled as long as watch diet and alcohol. Only needs protonix occasionally.      The following portions of the patient's history were reviewed and updated as appropriate: allergies, current medications, past family history, past medical history, past social history, past surgical history and problem list.    Outpatient Medications Prior to Visit   Medication Sig Dispense Refill   • finasteride (PROPECIA) 1 MG tablet TAKE ONE  TABLET BY MOUTH DAILY (Patient taking differently: 1/2 tablet once a week) 30 tablet 0   • hydrocortisone 2.5 % cream      • ibuprofen (ADVIL,MOTRIN) 600 MG tablet Take 1 tablet by mouth Every 6 (Six) Hours As Needed for Mild Pain . 30 tablet 0   • lisinopril-hydrochlorothiazide (ZESTORETIC) 10-12.5 MG per tablet Take 1 tablet by mouth Daily. 90 tablet 1   • pantoprazole (PROTONIX) 40 MG EC tablet Take 1 tablet by mouth Daily. (Patient taking differently: Take 40 mg by mouth As Needed.) 90 tablet 1     No facility-administered medications prior to visit.        Patient Active Problem List   Diagnosis   • Back pain without radiculopathy   • Gastroesophageal reflux disease without esophagitis   • Esophageal dysphagia   • B12 deficiency   • Mixed hyperlipidemia   • Benign essential hypertension   • Seborrheic dermatitis   • Left-sided low back pain with sciatica   • Plantar fasciitis   • Fatigue   • Acute stress disorder   • Allergic rhinitis   • Achilles bursitis or tendinitis   • Body mass index (bmi) 28.0-28.9, adult   • Annual physical exam   • Stress       Advance Care Planning:  ACP discussion was held with the patient during this visit. Patient does not have an advance directive, information provided.    Identification of Risk Factors:  Risk factors include: Cardiovascular risk.    Review of Systems   Constitutional: Negative for chills, fatigue and fever.   HENT: Negative for sinus pain and sore throat.    Eyes: Negative for visual disturbance.   Respiratory: Negative for cough, shortness of breath and wheezing.    Cardiovascular: Negative for chest pain, palpitations and leg swelling.   Gastrointestinal: Negative for abdominal pain, blood in stool, constipation and diarrhea.   Endocrine: Negative for cold intolerance and heat intolerance.   Genitourinary: Negative for dysuria and frequency.   Musculoskeletal: Negative for arthralgias and back pain.   Skin: Negative for rash and wound.   Allergic/Immunologic:  "Negative for food allergies.   Neurological: Negative for dizziness and headaches.   Hematological: Negative for adenopathy. Does not bruise/bleed easily.   Psychiatric/Behavioral: Negative for dysphoric mood and suicidal ideas. The patient is not nervous/anxious.        Compared to one year ago, the patient feels his physical health is the same.  Compared to one year ago, the patient feels his mental health is the same.    Objective     Physical Exam   Constitutional: He is oriented to person, place, and time. He appears well-developed and well-nourished.   HENT:   Head: Normocephalic.   Eyes: Pupils are equal, round, and reactive to light. Conjunctivae and EOM are normal.   Neck: Normal range of motion. Neck supple. No thyromegaly present.   Cardiovascular: Normal rate, regular rhythm, normal heart sounds and intact distal pulses.   Pulmonary/Chest: Effort normal and breath sounds normal. He has no wheezes.   Abdominal: Soft. Bowel sounds are normal. There is no tenderness.   Musculoskeletal: Normal range of motion. He exhibits no edema or tenderness.   Lymphadenopathy:     He has no cervical adenopathy.     He has no axillary adenopathy.   Neurological: He is alert and oriented to person, place, and time. He has normal strength. No cranial nerve deficit or sensory deficit. Coordination and gait normal.   Skin: Skin is warm and dry. No rash noted.   Psychiatric: He has a normal mood and affect. His speech is normal and behavior is normal. Judgment and thought content normal. Cognition and memory are normal.   Nursing note and vitals reviewed.      Vitals:    07/10/20 1148   BP: 138/84   BP Location: Right arm   Patient Position: Sitting   Cuff Size: Adult   Pulse: 60   Temp: 98.2 °F (36.8 °C)   TempSrc: Temporal   Weight: 98.3 kg (216 lb 12.8 oz)   Height: 185.4 cm (73\")   PainSc: 0-No pain       Patient's Body mass index is 28.6 kg/m². BMI is above normal parameters. Recommendations include: educational material, " exercise counseling and nutrition counseling.      CMP:  Lab Results   Component Value Date    BUN 19 02/07/2019    CREATININE 1.07 02/07/2019    EGFRIFNONA 75 02/07/2019    BCR 17.8 02/07/2019     02/07/2019    K 4.1 02/07/2019    CO2 30.0 02/07/2019    CALCIUM 9.9 02/07/2019    ALBUMIN 4.86 (H) 02/07/2019    BILITOT 1.1 02/07/2019    ALKPHOS 47 02/07/2019    AST 27 02/07/2019    ALT 40 02/07/2019     HbA1c:  No results found for: HGBA1C  Microalbumin:  No results found for: MICROALBUR, POCMALB, POCCREAT  Lipid Panel  Lab Results   Component Value Date    CHOL 174 02/07/2019    TRIG 166 (H) 02/07/2019    HDL 37 (L) 02/07/2019     02/07/2019    AST 27 02/07/2019    ALT 40 02/07/2019       Assessment/Plan   Patient Self-Management and Personalized Health Advice  The patient has been provided with information about: diet, exercise and weight management and preventive services including:   · Annual Wellness Visit (AWV).  Patient Instructions   Problem List Items Addressed This Visit        Cardiovascular and Mediastinum    Mixed hyperlipidemia    Overview     7/10/2020 Sherri Anna MD    Continue to improve low-fat low sugar diet.  Resume regular exercise.         Relevant Orders    Comprehensive Metabolic Panel    CBC & Differential    Lipid Panel    TSH    Benign essential hypertension    Overview     7/10/2020 Sherri Anna MD    Continue lisinopril/hydrochlorothiazide daily.     Continue to avoid salt in the diet and avoid sodas.         Relevant Medications    lisinopril-hydrochlorothiazide (Zestoretic) 10-12.5 MG per tablet    Other Relevant Orders    Urinalysis With Culture If Indicated -    Microalbumin / Creatinine Urine Ratio - Urine, Clean Catch       Digestive    Gastroesophageal reflux disease without esophagitis    Overview     7/10/2020 Sherri Anna MD    Continue pantoprazole daily as needed.      Avoid eating close to bedtime.  Avoid large meals.         Relevant Medications     pantoprazole (PROTONIX) 40 MG EC tablet       Other    Body mass index (bmi) 28.0-28.9, adult (Chronic)    Overview     7/10/2020 Sherri Anna MD    Increase exercise and walking.  Continue to improve low fat low sugar diet and eat smaller portions at mealtime.    Try to lose about 10 lb. Weight every Monday morning to monitor progress.         Stress (Chronic)    Overview     7/10/2020 Sherri Anna MD    Exercise and physical activity help decrease stress, anxiety, and dysthymia symptoms.         Annual physical exam - Primary    Overview     Get a flu shot early September.  Second Hepatitis A vaccine also due.    Colonoscopy,traditionally recommended at age 50, is now being recommended earlier. We will discuss at next visit.           Other Visit Diagnoses     Encounter for hepatitis C screening test for low risk patient        Relevant Orders    Hepatitis C Antibody             Diagnosis Plan   1. Annual physical exam     2. Benign essential hypertension  Urinalysis With Culture If Indicated -    Microalbumin / Creatinine Urine Ratio - Urine, Clean Catch    lisinopril-hydrochlorothiazide (Zestoretic) 10-12.5 MG per tablet   3. Mixed hyperlipidemia  Comprehensive Metabolic Panel    CBC & Differential    Lipid Panel    TSH   4. Gastroesophageal reflux disease without esophagitis     5. Stress     6. Body mass index (bmi) 28.0-28.9, adult     7. Encounter for hepatitis C screening test for low risk patient  Hepatitis C Antibody       Outpatient Encounter Medications as of 7/10/2020   Medication Sig Dispense Refill   • finasteride (PROPECIA) 1 MG tablet TAKE ONE TABLET BY MOUTH DAILY (Patient taking differently: 1/2 tablet once a week) 30 tablet 0   • hydrocortisone 2.5 % cream      • ibuprofen (ADVIL,MOTRIN) 600 MG tablet Take 1 tablet by mouth Every 6 (Six) Hours As Needed for Mild Pain . 30 tablet 0   • lisinopril-hydrochlorothiazide (Zestoretic) 10-12.5 MG per tablet Take 1 tablet by mouth Daily. 90  tablet 1   • pantoprazole (PROTONIX) 40 MG EC tablet Take 1 tablet by mouth As Needed (heartburn). 30 tablet 5   • [DISCONTINUED] lisinopril-hydrochlorothiazide (ZESTORETIC) 10-12.5 MG per tablet Take 1 tablet by mouth Daily. 90 tablet 1   • [DISCONTINUED] pantoprazole (PROTONIX) 40 MG EC tablet Take 1 tablet by mouth Daily. (Patient taking differently: Take 40 mg by mouth As Needed.) 90 tablet 1     No facility-administered encounter medications on file as of 7/10/2020.        Reviewed use of high risk medication in the elderly: not applicable  Reviewed for potential of harmful drug interactions in the elderly: not applicable    Follow Up:  Return in about 6 months (around 1/10/2021) for Annual physical.     An After Visit Summary and PPPS with all of these plans were given to the patient.           Note: Part of this note may be an electronic transcription/translation of spoken language to printed text using the Dragon Dictation System.

## 2020-07-10 NOTE — PATIENT INSTRUCTIONS
Patient Instructions   Problem List Items Addressed This Visit        Cardiovascular and Mediastinum    Mixed hyperlipidemia    Overview     7/10/2020 Sherri Anna MD    Continue to improve low-fat low sugar diet.  Resume regular exercise.         Relevant Orders    Comprehensive Metabolic Panel    CBC & Differential    Lipid Panel    TSH    Benign essential hypertension    Overview     7/10/2020 Sherri Anna MD    Continue lisinopril/hydrochlorothiazide daily.     Continue to avoid salt in the diet and avoid sodas.         Relevant Medications    lisinopril-hydrochlorothiazide (Zestoretic) 10-12.5 MG per tablet    Other Relevant Orders    Urinalysis With Culture If Indicated -    Microalbumin / Creatinine Urine Ratio - Urine, Clean Catch       Digestive    Gastroesophageal reflux disease without esophagitis    Overview     7/10/2020 Sherri Anna MD    Continue pantoprazole daily as needed.      Avoid eating close to bedtime.  Avoid large meals.         Relevant Medications    pantoprazole (PROTONIX) 40 MG EC tablet       Other    Body mass index (bmi) 28.0-28.9, adult (Chronic)    Overview     7/10/2020 Sherri Anna MD    Increase exercise and walking.  Continue to improve low fat low sugar diet and eat smaller portions at mealtime.    Try to lose about 10 lb. Weight every Monday morning to monitor progress.         Stress (Chronic)    Overview     7/10/2020 Sherri Anna MD    Exercise and physical activity help decrease stress, anxiety, and dysthymia symptoms.         Annual physical exam - Primary    Overview     Get a flu shot early September.  Second Hepatitis A vaccine also due.    Colonoscopy,traditionally recommended at age 50, is now being recommended earlier. We will discuss at next visit.           Other Visit Diagnoses     Encounter for hepatitis C screening test for low risk patient        Relevant Orders    Hepatitis C Antibody             Mindfulness-Based Stress  Reduction  Mindfulness-based stress reduction (MBSR) is a program that helps people learn to practice mindfulness. Mindfulness is the practice of intentionally paying attention to the present moment. It can be learned and practiced through techniques such as education, breathing exercises, meditation, and yoga. MBSR includes several mindfulness techniques in one program.  MBSR works best when you understand the treatment, are willing to try new things, and can commit to spending time practicing what you learn. MBSR training may include learning about:  · How your emotions, thoughts, and reactions affect your body.  · New ways to respond to things that cause negative thoughts to start (triggers).  · How to notice your thoughts and let go of them.  · Practicing awareness of everyday things that you normally do without thinking.  · The techniques and goals of different types of meditation.  What are the benefits of MBSR?  MBSR can have many benefits, which include helping you to:  · Develop self-awareness. This refers to knowing and understanding yourself.  · Learn skills and attitudes that help you to participate in your own health care.  · Learn new ways to care for yourself.  · Be more accepting about how things are, and let things go.  · Be less judgmental and approach things with an open mind.  · Be patient with yourself and trust yourself more.  MBSR has also been shown to:  · Reduce negative emotions, such as depression and anxiety.  · Improve memory and focus.  · Change how you sense and approach pain.  · Boost your body's ability to fight infections.  · Help you connect better with other people.  · Improve your sense of well-being.  Follow these instructions at home:    · Find a local in-person or online MBSR program.  · Set aside some time regularly for mindfulness practice.  · Find a mindfulness practice that works best for you. This may include one or more of the following:  ? Meditation. Meditation  involves focusing your mind on a certain thought or activity.  ? Breathing awareness exercises. These help you to stay present by focusing on your breath.  ? Body scan. For this practice, you lie down and pay attention to each part of your body from head to toe. You can identify tension and soreness and intentionally relax parts of your body.  ? Yoga. Yoga involves stretching and breathing, and it can improve your ability to move and be flexible. It can also provide an experience of testing your body's limits, which can help you release stress.  ? Mindful eating. This way of eating involves focusing on the taste, texture, color, and smell of each bite of food. Because this slows down eating and helps you feel full sooner, it can be an important part of a weight-loss plan.  · Find a podcast or recording that provides guidance for breathing awareness, body scan, or meditation exercises. You can listen to these any time when you have a free moment to rest without distractions.  · Follow your treatment plan as told by your health care provider. This may include taking regular medicines and making changes to your diet or lifestyle as recommended.  How to practice mindfulness  To do a basic awareness exercise:  · Find a comfortable place to sit.  · Pay attention to the present moment. Observe your thoughts, feelings, and surroundings just as they are.  · Avoid placing judgment on yourself, your feelings, or your surroundings. Make note of any judgment that comes up, and let it go.  · Your mind may wander, and that is okay. Make note of when your thoughts drift, and return your attention to the present moment.  To do basic mindfulness meditation:  · Find a comfortable place to sit. This may include a stable chair or a firm floor cushion.  ? Sit upright with your back straight. Let your arms fall next to your side with your hands resting on your legs.  ? If sitting in a chair, rest your feet flat on the floor.  ? If sitting  on a cushion, cross your legs in front of you.  · Keep your head in a neutral position with your chin dropped slightly. Relax your jaw and rest the tip of your tongue on the roof of your mouth. Drop your gaze to the floor. You can close your eyes if you like.  · Breathe normally and pay attention to your breath. Feel the air moving in and out of your nose. Feel your belly expanding and relaxing with each breath.  · Your mind may wander, and that is okay. Make note of when your thoughts drift, and return your attention to your breath.  · Avoid placing judgment on yourself, your feelings, or your surroundings. Make note of any judgment or feelings that come up, let them go, and bring your attention back to your breath.  · When you are ready, lift your gaze or open your eyes. Pay attention to how your body feels after the meditation.  Where to find more information  You can find more information about MBSR from:  · Your health care provider.  · Community-based meditation centers or programs.  · Programs offered near you.  Summary  · Mindfulness-based stress reduction (MBSR) is a program that teaches you how to intentionally pay attention to the present moment. It is used with other treatments to help you cope better with daily stress, emotions, and pain.  · MBSR focuses on developing self-awareness, which allows you to respond to life stress without judgment or negative emotions.  · MBSR programs may involve learning different mindfulness practices, such as breathing exercises, meditation, yoga, body scan, or mindful eating. Find a mindfulness practice that works best for you, and set aside time for it on a regular basis.  This information is not intended to replace advice given to you by your health care provider. Make sure you discuss any questions you have with your health care provider.  Document Released: 04/26/2018 Document Revised: 11/30/2018 Document Reviewed: 04/26/2018  Elsevier Patient Education © 2020  [employer]    Exercising to Stay Healthy  To become healthy and stay healthy, it is recommended that you do moderate-intensity and vigorous-intensity exercise. You can tell that you are exercising at a moderate intensity if your heart starts beating faster and you start breathing faster but can still hold a conversation. You can tell that you are exercising at a vigorous intensity if you are breathing much harder and faster and cannot hold a conversation while exercising.  Exercising regularly is important. It has many health benefits, such as:  · Improving overall fitness, flexibility, and endurance.  · Increasing bone density.  · Helping with weight control.  · Decreasing body fat.  · Increasing muscle strength.  · Reducing stress and tension.  · Improving overall health.  How often should I exercise?  Choose an activity that you enjoy, and set realistic goals. Your health care provider can help you make an activity plan that works for you.  Exercise regularly as told by your health care provider. This may include:  · Doing strength training two times a week, such as:  ? Lifting weights.  ? Using resistance bands.  ? Push-ups.  ? Sit-ups.  ? Yoga.  · Doing a certain intensity of exercise for a given amount of time. Choose from these options:  ? A total of 150 minutes of moderate-intensity exercise every week.  ? A total of 75 minutes of vigorous-intensity exercise every week.  ? A mix of moderate-intensity and vigorous-intensity exercise every week.  Children, pregnant women, people who have not exercised regularly, people who are overweight, and older adults may need to talk with a health care provider about what activities are safe to do. If you have a medical condition, be sure to talk with your health care provider before you start a new exercise program.  What are some exercise ideas?  Moderate-intensity exercise ideas include:  · Walking 1 mile (1.6 km) in about 15  minutes.  · Biking.  · Hiking.  · Golfing.  · Dancing.  · Water aerobics.  Vigorous-intensity exercise ideas include:  · Walking 4.5 miles (7.2 km) or more in about 1 hour.  · Jogging or running 5 miles (8 km) in about 1 hour.  · Biking 10 miles (16.1 km) or more in about 1 hour.  · Lap swimming.  · Roller-skating or in-line skating.  · Cross-country skiing.  · Vigorous competitive sports, such as football, basketball, and soccer.  · Jumping rope.  · Aerobic dancing.  What are some everyday activities that can help me to get exercise?  · Yard work, such as:  ? Pushing a .  ? Raking and bagging leaves.  · Washing your car.  · Pushing a stroller.  · Shoveling snow.  · Gardening.  · Washing windows or floors.  How can I be more active in my day-to-day activities?  · Use stairs instead of an elevator.  · Take a walk during your lunch break.  · If you drive, park your car farther away from your work or school.  · If you take public transportation, get off one stop early and walk the rest of the way.  · Stand up or walk around during all of your indoor phone calls.  · Get up, stretch, and walk around every 30 minutes throughout the day.  · Enjoy exercise with a friend. Support to continue exercising will help you keep a regular routine of activity.  What guidelines can I follow while exercising?  · Before you start a new exercise program, talk with your health care provider.  · Do not exercise so much that you hurt yourself, feel dizzy, or get very short of breath.  · Wear comfortable clothes and wear shoes with good support.  · Drink plenty of water while you exercise to prevent dehydration or heat stroke.  · Work out until your breathing and your heartbeat get faster.  Where to find more information  · [place] Department of Health and Human Services: www.hhs.gov  · Centers for Disease Control and Prevention (CDC): www.cdc.gov  Summary  · Exercising regularly is important. It will improve your overall fitness,  flexibility, and endurance.  · Regular exercise also will improve your overall health. It can help you control your weight, reduce stress, and improve your bone density.  · Do not exercise so much that you hurt yourself, feel dizzy, or get very short of breath.  · Before you start a new exercise program, talk with your health care provider.  This information is not intended to replace advice given to you by your health care provider. Make sure you discuss any questions you have with your health care provider.  Document Released: 01/20/2012 Document Revised: 11/30/2018 Document Reviewed: 11/08/2018  Elsevier Patient Education © 2020 [employer] Inc[employer]

## 2020-07-10 NOTE — PROGRESS NOTES
[facility]     Colin Cheema  1975   [id number]      Patient Care Team:  Sherri Anna MD as PCP - Internal Medicine (Internal Medicine)    Chief Complaint   Patient presents with   • Annual Exam   • Hyperlipidemia     f/u   • Hypertension            HPI  Patient is a 45 y.o. male presents with follow up hypertension,hyperlipidemia,GERD      CHRONIC CONDITIONS  BP's at home  125-135 /82-85. Had been exercising a lot till recent vacation. Usually eating low fat diet.     GERD controlled as long as watch diet and alcohol. Only needs protonix occasionally.    Past Medical History:   Diagnosis Date   • Allergic rhinitis    • GERD (gastroesophageal reflux disease)    • Hyperlipidemia    • Hypertension    • Low back pain    • Numbness and tingling in left arm 12/29/2015   • Piriformis syndrome, left 05/06/2016   • Plantar fasciitis 02/12/2016    left foot   • Vitamin B 12 deficiency        Past Surgical History:   Procedure Laterality Date   • HERNIA REPAIR Bilateral 2001    Inguinal       Family History   Problem Relation Age of Onset   • Testicular cancer Father    • Colon cancer Other        Social History     Socioeconomic History   • Marital status:      Spouse name: Not on file   • Number of children: Not on file   • Years of education: Not on file   • Highest education level: Not on file   Tobacco Use   • Smoking status: Never Smoker   • Smokeless tobacco: Never Used   Substance and Sexual Activity   • Alcohol use: Yes     Frequency: 2-3 times a week     Drinks per session: 1 or 2     Binge frequency: Never     Comment: socially       Allergies   Allergen Reactions   • Loratadine Unknown (See Comments)     Unknown reaction       Review of Systems:     Review of Systems    Vital Signs  Vitals:    07/10/20 1148   BP: 138/84   BP Location: Right arm   Patient Position: Sitting   Cuff Size: Adult   Pulse: 60   Temp: 98.2 °F (36.8 °C)   TempSrc: Temporal   Weight: 98.3 kg (216 lb 12.8  "oz)   Height: 185.4 cm (73\")   PainSc: 0-No pain     Body mass index is 28.6 kg/m².      Current Outpatient Medications:   •  finasteride (PROPECIA) 1 MG tablet, TAKE ONE TABLET BY MOUTH DAILY (Patient taking differently: 1/2 tablet once a week), Disp: 30 tablet, Rfl: 0  •  hydrocortisone 2.5 % cream, , Disp: , Rfl:   •  ibuprofen (ADVIL,MOTRIN) 600 MG tablet, Take 1 tablet by mouth Every 6 (Six) Hours As Needed for Mild Pain ., Disp: 30 tablet, Rfl: 0  •  lisinopril-hydrochlorothiazide (Zestoretic) 10-12.5 MG per tablet, Take 1 tablet by mouth Daily., Disp: 90 tablet, Rfl: 1  •  pantoprazole (PROTONIX) 40 MG EC tablet, Take 1 tablet by mouth Daily. (Patient taking differently: Take 40 mg by mouth As Needed.), Disp: 90 tablet, Rfl: 1    Physical Exam:    Physical Exam     ACE III MINI        Results Review:    {Results Review:38431::\"I reviewed the patient's new clinical results.\"}    CMP:  Lab Results   Component Value Date    BUN 19 02/07/2019    CREATININE 1.07 02/07/2019    EGFRIFNONA 75 02/07/2019    BCR 17.8 02/07/2019     02/07/2019    K 4.1 02/07/2019    CO2 30.0 02/07/2019    CALCIUM 9.9 02/07/2019    ALBUMIN 4.86 (H) 02/07/2019    BILITOT 1.1 02/07/2019    ALKPHOS 47 02/07/2019    AST 27 02/07/2019    ALT 40 02/07/2019     HbA1c:  No results found for: HGBA1C  Microalbumin:  No results found for: MICROALBUR, POCMALB, POCCREAT  Lipid Panel  Lab Results   Component Value Date    CHOL 174 02/07/2019    TRIG 166 (H) 02/07/2019    HDL 37 (L) 02/07/2019     02/07/2019    AST 27 02/07/2019    ALT 40 02/07/2019       Medication Review: Medications reviewed and noted  Patient Instructions   Problem List Items Addressed This Visit        Cardiovascular and Mediastinum    Mixed hyperlipidemia    Relevant Orders    Comprehensive Metabolic Panel    CBC & Differential    Lipid Panel    TSH    Benign essential hypertension - Primary    Overview     4/10/2020 Sherri Anna MD    Continue " lisinopril/hydrochlorothiazide daily. Continue to avoid salt in the diet and avoid sodas.         Relevant Medications    lisinopril-hydrochlorothiazide (Zestoretic) 10-12.5 MG per tablet    Other Relevant Orders    Urinalysis With Culture If Indicated -    Microalbumin / Creatinine Urine Ratio - Urine, Clean Catch       Digestive    Gastroesophageal reflux disease without esophagitis    Overview     4/10/2020 Sherri Anna MD    Continue pantoprazole daily.  May take an extra one in the evening on days need to take ibuprofen.  Always take ibuprofen with food.    Avoid eating close to bedtime.  Avoid large meals.         Relevant Medications    pantoprazole (PROTONIX) 40 MG EC tablet      Other Visit Diagnoses     Encounter for hepatitis C screening test for low risk patient        Relevant Orders    Hepatitis C Antibody             Diagnosis Plan   1. Benign essential hypertension  Urinalysis With Culture If Indicated -    Microalbumin / Creatinine Urine Ratio - Urine, Clean Catch    lisinopril-hydrochlorothiazide (Zestoretic) 10-12.5 MG per tablet   2. Mixed hyperlipidemia  Comprehensive Metabolic Panel    CBC & Differential    Lipid Panel    TSH   3. Gastroesophageal reflux disease without esophagitis     4. Encounter for hepatitis C screening test for low risk patient  Hepatitis C Antibody       Plan of care reviewed with patient at the conclusion of today's visit. Education was provided regarding diagnosis, management, and any prescribed or recommended OTC medications.Patient verbalizes understanding of and agreement with management plan.         Olesya Purvis LPN

## 2020-08-04 ENCOUNTER — LAB (OUTPATIENT)
Dept: LAB | Facility: HOSPITAL | Age: 45
End: 2020-08-04

## 2020-08-04 DIAGNOSIS — E78.2 MIXED HYPERLIPIDEMIA: ICD-10-CM

## 2020-08-04 DIAGNOSIS — Z11.59 ENCOUNTER FOR HEPATITIS C SCREENING TEST FOR LOW RISK PATIENT: ICD-10-CM

## 2020-08-04 DIAGNOSIS — I10 BENIGN ESSENTIAL HYPERTENSION: ICD-10-CM

## 2020-08-05 LAB
ALBUMIN SERPL-MCNC: 4.9 G/DL (ref 3.5–5.2)
ALBUMIN/CREAT UR: 7 MG/G CREAT (ref 0–29)
ALBUMIN/GLOB SERPL: 2.3 G/DL
ALP SERPL-CCNC: 42 U/L (ref 39–117)
ALT SERPL-CCNC: 31 U/L (ref 1–41)
APPEARANCE UR: CLEAR
AST SERPL-CCNC: 19 U/L (ref 1–40)
BACTERIA #/AREA URNS HPF: NORMAL /HPF
BASOPHILS # BLD AUTO: 0.02 10*3/MM3 (ref 0–0.2)
BASOPHILS NFR BLD AUTO: 0.4 % (ref 0–1.5)
BILIRUB SERPL-MCNC: 0.7 MG/DL (ref 0–1.2)
BILIRUB UR QL STRIP: NEGATIVE
BUN SERPL-MCNC: 23 MG/DL (ref 6–20)
BUN/CREAT SERPL: 21.3 (ref 7–25)
CALCIUM SERPL-MCNC: 9.5 MG/DL (ref 8.6–10.5)
CHLORIDE SERPL-SCNC: 99 MMOL/L (ref 98–107)
CHOLEST SERPL-MCNC: 174 MG/DL (ref 0–200)
CO2 SERPL-SCNC: 28.8 MMOL/L (ref 22–29)
COLOR UR: YELLOW
CREAT SERPL-MCNC: 1.08 MG/DL (ref 0.76–1.27)
CREAT UR-MCNC: 187.4 MG/DL
EOSINOPHIL # BLD AUTO: 0.14 10*3/MM3 (ref 0–0.4)
EOSINOPHIL NFR BLD AUTO: 2.7 % (ref 0.3–6.2)
EPI CELLS #/AREA URNS HPF: NORMAL /HPF (ref 0–10)
ERYTHROCYTE [DISTWIDTH] IN BLOOD BY AUTOMATED COUNT: 12.5 % (ref 12.3–15.4)
GLOBULIN SER CALC-MCNC: 2.1 GM/DL
GLUCOSE SERPL-MCNC: 95 MG/DL (ref 65–99)
GLUCOSE UR QL: NEGATIVE
HCT VFR BLD AUTO: 49.2 % (ref 37.5–51)
HCV AB S/CO SERPL IA: <0.1 S/CO RATIO (ref 0–0.9)
HDLC SERPL-MCNC: 40 MG/DL (ref 40–60)
HGB BLD-MCNC: 16.1 G/DL (ref 13–17.7)
HGB UR QL STRIP: NEGATIVE
IMM GRANULOCYTES # BLD AUTO: 0.01 10*3/MM3 (ref 0–0.05)
IMM GRANULOCYTES NFR BLD AUTO: 0.2 % (ref 0–0.5)
KETONES UR QL STRIP: NEGATIVE
LDLC SERPL CALC-MCNC: 103 MG/DL (ref 0–100)
LEUKOCYTE ESTERASE UR QL STRIP: NEGATIVE
LYMPHOCYTES # BLD AUTO: 1.02 10*3/MM3 (ref 0.7–3.1)
LYMPHOCYTES NFR BLD AUTO: 19.6 % (ref 19.6–45.3)
MCH RBC QN AUTO: 29.2 PG (ref 26.6–33)
MCHC RBC AUTO-ENTMCNC: 32.7 G/DL (ref 31.5–35.7)
MCV RBC AUTO: 89.3 FL (ref 79–97)
MICRO URNS: NORMAL
MICRO URNS: NORMAL
MICROALBUMIN UR-MCNC: 13.1 UG/ML
MONOCYTES # BLD AUTO: 0.4 10*3/MM3 (ref 0.1–0.9)
MONOCYTES NFR BLD AUTO: 7.7 % (ref 5–12)
MUCOUS THREADS URNS QL MICRO: PRESENT /HPF
NEUTROPHILS # BLD AUTO: 3.62 10*3/MM3 (ref 1.7–7)
NEUTROPHILS NFR BLD AUTO: 69.4 % (ref 42.7–76)
NITRITE UR QL STRIP: NEGATIVE
NRBC BLD AUTO-RTO: 0 /100 WBC (ref 0–0.2)
PH UR STRIP: 7.5 [PH] (ref 5–7.5)
PLATELET # BLD AUTO: 270 10*3/MM3 (ref 140–450)
POTASSIUM SERPL-SCNC: 4.5 MMOL/L (ref 3.5–5.2)
PROT SERPL-MCNC: 7 G/DL (ref 6–8.5)
PROT UR QL STRIP: NEGATIVE
RBC # BLD AUTO: 5.51 10*6/MM3 (ref 4.14–5.8)
RBC #/AREA URNS HPF: NORMAL /HPF (ref 0–2)
SODIUM SERPL-SCNC: 139 MMOL/L (ref 136–145)
SP GR UR: 1.02 (ref 1–1.03)
TRIGL SERPL-MCNC: 157 MG/DL (ref 0–150)
TSH SERPL DL<=0.005 MIU/L-ACNC: 0.94 UIU/ML (ref 0.27–4.2)
URINALYSIS REFLEX: NORMAL
UROBILINOGEN UR STRIP-MCNC: 0.2 MG/DL (ref 0.2–1)
VLDLC SERPL CALC-MCNC: 31.4 MG/DL
WBC # BLD AUTO: 5.21 10*3/MM3 (ref 3.4–10.8)
WBC #/AREA URNS HPF: NORMAL /HPF (ref 0–5)

## 2020-11-13 RX ORDER — FINASTERIDE 1 MG/1
1 TABLET, FILM COATED ORAL DAILY
Qty: 30 TABLET | Refills: 0 | Status: SHIPPED | OUTPATIENT
Start: 2020-11-13 | End: 2021-08-27

## 2021-01-12 ENCOUNTER — OFFICE VISIT (OUTPATIENT)
Dept: INTERNAL MEDICINE | Facility: CLINIC | Age: 46
End: 2021-01-12

## 2021-01-12 VITALS
HEIGHT: 73 IN | HEART RATE: 54 BPM | OXYGEN SATURATION: 99 % | BODY MASS INDEX: 29.1 KG/M2 | DIASTOLIC BLOOD PRESSURE: 86 MMHG | WEIGHT: 219.6 LBS | TEMPERATURE: 97.8 F | SYSTOLIC BLOOD PRESSURE: 138 MMHG

## 2021-01-12 DIAGNOSIS — K21.9 GASTROESOPHAGEAL REFLUX DISEASE WITHOUT ESOPHAGITIS: ICD-10-CM

## 2021-01-12 DIAGNOSIS — R13.19 ESOPHAGEAL DYSPHAGIA: Primary | ICD-10-CM

## 2021-01-12 DIAGNOSIS — I10 BENIGN ESSENTIAL HYPERTENSION: ICD-10-CM

## 2021-01-12 DIAGNOSIS — L57.0 ACTINIC KERATOSIS: ICD-10-CM

## 2021-01-12 DIAGNOSIS — E66.3 OVERWEIGHT WITH BODY MASS INDEX (BMI) OF 29 TO 29.9 IN ADULT: Chronic | ICD-10-CM

## 2021-01-12 DIAGNOSIS — E78.2 MIXED HYPERLIPIDEMIA: ICD-10-CM

## 2021-01-12 PROBLEM — M76.60 ACHILLES BURSITIS: Status: RESOLVED | Noted: 2019-02-06 | Resolved: 2021-01-12

## 2021-01-12 PROCEDURE — 99214 OFFICE O/P EST MOD 30 MIN: CPT | Performed by: INTERNAL MEDICINE

## 2021-01-12 RX ORDER — PANTOPRAZOLE SODIUM 40 MG/1
40 TABLET, DELAYED RELEASE ORAL DAILY PRN
Qty: 30 TABLET | Refills: 5 | Status: SHIPPED | OUTPATIENT
Start: 2021-01-12 | End: 2021-01-15 | Stop reason: SDUPTHER

## 2021-01-12 RX ORDER — LISINOPRIL AND HYDROCHLOROTHIAZIDE 12.5; 1 MG/1; MG/1
1 TABLET ORAL DAILY
Qty: 90 TABLET | Refills: 1 | Status: SHIPPED | OUTPATIENT
Start: 2021-01-12 | End: 2021-07-26

## 2021-01-12 NOTE — PROGRESS NOTES
"[facility]     Colin Cheema  1975   [id number]      Patient Care Team:  Sherri Anna MD as PCP - Internal Medicine (Internal Medicine)    Chief Complaint   Patient presents with   • Hypertension     f/u   • Hyperlipidemia   • Trouble swallowing     would like a referral for a scope            HPI  Patient is a 45 y.o. male presents with trouble swallowing. Onset of symptoms was gradual starting several months ago.  Chronicity chronic but worse. Severe  Severity moderate to severe.  Symptoms are associated with increased heartburn and acid reflux. Pertinent negatives no weight loss.   Symptoms are aggravated by beer and chocolate and spicy food and caffeine.   Symptoms improve with avoiding triggers.  Context had these same symptoms in the past. Usually avoids caffeine after one cup in am.      He notes that his mother recently had endoscopic repair of hiatal hernia and has been very pleased with the results.    HPI  Spot on bridge of nose for about a year. \"never heals.\" No bleeding or growth.     CHRONIC CONDITIONS  BPs at home run  120s-130/80-85. Takes lisinopril/hct daily.    For hyperlipidemia, he does try to eat less fats and sugars.  Has been exercising more often.Admits he needs to eat smaller portions.    Past Medical History:   Diagnosis Date   • Achilles bursitis or tendinitis 2/6/2019   • Allergic rhinitis    • GERD (gastroesophageal reflux disease)    • Hyperlipidemia    • Hypertension    • Low back pain    • Numbness and tingling in left arm 12/29/2015   • Piriformis syndrome, left 05/06/2016   • Plantar fasciitis 02/12/2016    left foot   • Vitamin B 12 deficiency        Past Surgical History:   Procedure Laterality Date   • HERNIA REPAIR Bilateral 2001    Inguinal       Family History   Problem Relation Age of Onset   • Testicular cancer Father    • Colon cancer Other        Social History     Socioeconomic History   • Marital status:      Spouse name: Not on file " "  • Number of children: Not on file   • Years of education: Not on file   • Highest education level: Not on file   Tobacco Use   • Smoking status: Never Smoker   • Smokeless tobacco: Never Used   Substance and Sexual Activity   • Alcohol use: Yes     Frequency: 2-3 times a week     Drinks per session: 1 or 2     Binge frequency: Never     Comment: socially       Allergies   Allergen Reactions   • Loratadine Unknown (See Comments)     Unknown reaction       Review of Systems:     Review of Systems   Constitutional: Negative for chills, fatigue, fever and unexpected weight loss.   HENT: Positive for trouble swallowing. Negative for sinus pressure and sore throat.    Respiratory: Negative for cough and shortness of breath.    Cardiovascular: Negative for chest pain, palpitations and leg swelling.   Gastrointestinal: Positive for GERD and indigestion.   Skin: Positive for skin lesions.       Vital Signs  Vitals:    01/12/21 0953   BP: 138/86   BP Location: Left arm   Patient Position: Sitting   Cuff Size: Adult   Pulse: 54   Temp: 97.8 °F (36.6 °C)   TempSrc: Infrared   SpO2: 99%   Weight: 99.6 kg (219 lb 9.6 oz)   Height: 185.4 cm (73\")   PainSc: 0-No pain     Body mass index is 28.97 kg/m².      Current Outpatient Medications:   •  finasteride (PROPECIA) 1 MG tablet, Take 1 tablet by mouth Daily. (Patient taking differently: Take 1 mg by mouth Daily. Taking 1/2 tablet twice weekly), Disp: 30 tablet, Rfl: 0  •  hydrocortisone 2.5 % cream, , Disp: , Rfl:   •  lisinopril-hydrochlorothiazide (Zestoretic) 10-12.5 MG per tablet, Take 1 tablet by mouth Daily., Disp: 90 tablet, Rfl: 1  •  ibuprofen (ADVIL,MOTRIN) 600 MG tablet, Take 1 tablet by mouth Every 6 (Six) Hours As Needed for Mild Pain ., Disp: 30 tablet, Rfl: 0  •  pantoprazole (PROTONIX) 40 MG EC tablet, Take 1 tablet by mouth Daily., Disp: 30 tablet, Rfl: 5    Physical Exam:    Physical Exam  Vitals signs and nursing note reviewed.   Constitutional:       " Appearance: He is well-developed and overweight.   HENT:      Head: Normocephalic.   Eyes:      Conjunctiva/sclera: Conjunctivae normal.      Pupils: Pupils are equal, round, and reactive to light.   Neck:      Musculoskeletal: Normal range of motion and neck supple.      Thyroid: No thyromegaly.   Cardiovascular:      Rate and Rhythm: Normal rate and regular rhythm.      Heart sounds: Normal heart sounds.   Pulmonary:      Effort: Pulmonary effort is normal.      Breath sounds: Normal breath sounds. No wheezing.   Musculoskeletal: Normal range of motion.   Lymphadenopathy:      Cervical: No cervical adenopathy.   Skin:     General: Skin is warm and dry.      Findings: Lesion present.          Neurological:      Mental Status: He is alert and oriented to person, place, and time.   Psychiatric:         Thought Content: Thought content normal.          ACE III MINI        Results Review:    Discussed with We reviewed his last lab work.  /    CMP:  Lab Results   Component Value Date    GLU 95 08/04/2020    BUN 23 (H) 08/04/2020    CREATININE 1.08 08/04/2020    EGFRIFNONA 74 08/04/2020    EGFRIFAFRI 90 08/04/2020    BCR 21.3 08/04/2020     08/04/2020    K 4.5 08/04/2020    CO2 28.8 08/04/2020    CALCIUM 9.5 08/04/2020    PROTENTOTREF 7.0 08/04/2020    ALBUMIN 4.90 08/04/2020    LABGLOBREF 2.1 08/04/2020    LABIL2 2.3 08/04/2020    BILITOT 0.7 08/04/2020    ALKPHOS 42 08/04/2020    AST 19 08/04/2020    ALT 31 08/04/2020     HbA1c:  No results found for: HGBA1C  Microalbumin:  Lab Results   Component Value Date    MICROALBUR 13.1 08/04/2020     Lipid Panel  Lab Results   Component Value Date    CHOL 174 02/07/2019    TRIG 157 (H) 08/04/2020    HDL 40 08/04/2020     (H) 08/04/2020    AST 19 08/04/2020    ALT 31 08/04/2020       Medication Review: Medications reviewed and noted  Patient Instructions   Problem List Items Addressed This Visit        Cardiac and Vasculature    Mixed hyperlipidemia    Overview      1/12/2021 Sherri Anna MD    Continue to improve low-fat low sugar diet.  Resume regular exercise.         Benign essential hypertension    Overview     1/12/2021 Sherri Anna MD    Continue lisinopril/hydrochlorothiazide daily.     Continue to avoid salt in the diet and avoid sodas. Resume exercise.          Relevant Medications    lisinopril-hydrochlorothiazide (Zestoretic) 10-12.5 MG per tablet       Gastrointestinal Abdominal     Gastroesophageal reflux disease without esophagitis    Overview     1/15/2021 Sherri Anna MD    Take pantoprazole daily every day.      Referral to Dr. Lopez.    Use a wedge pillow to elevate the head at night.  Avoid eating close to bedtime.  Avoid large meals.  Avoid food triggers.         Relevant Medications    pantoprazole (PROTONIX) 40 MG EC tablet    Other Relevant Orders    Ambulatory Referral to Gastroenterology (Completed)    Esophageal dysphagia - Primary    Overview     1/12/2021 Sherri Anna MD    Resume pantoprazole daily.  Use wedge pillow for sleep. Avoid eating close to bedtime and avoid large meals.  Decrease caffeine and chocolate.     Refer to Dr. Lopez.         Relevant Medications    pantoprazole (PROTONIX) 40 MG EC tablet    Other Relevant Orders    Ambulatory Referral to Gastroenterology (Completed)       Skin    Actinic keratosis    Overview     1/15/2021 Sherri Anna MD    Small flat erythematous mildly scaling lesion on the right bridge of nose.    He will see his dermatologist.         Relevant Medications    hydrocortisone 2.5 % cream    finasteride (PROPECIA) 1 MG tablet       Other    Overweight with body mass index (BMI) of 29 to 29.9 in adult (Chronic)    Overview     1/12/2021 Sherri Anna MD    Increase exercise and walking.  Continue to improve low fat low sugar diet and eat smaller portions at mealtime.    Try to lose about 10 lb. Weigh every Monday morning to monitor progress.                    Diagnosis Plan   1.  Esophageal dysphagia  Ambulatory Referral to Gastroenterology   2. Gastroesophageal reflux disease without esophagitis  Ambulatory Referral to Gastroenterology   3. Benign essential hypertension  lisinopril-hydrochlorothiazide (Zestoretic) 10-12.5 MG per tablet   4. Mixed hyperlipidemia     5. Overweight with body mass index (BMI) of 29 to 29.9 in adult     6. Actinic keratosis         Plan of care reviewed with patient at the conclusion of today's visit. Education was provided regarding diagnosis, management, and any prescribed or recommended OTC medications.Patient verbalizes understanding of and agreement with management plan.         Sherri Anna MD

## 2021-01-15 PROBLEM — L57.0 ACTINIC KERATOSIS: Status: ACTIVE | Noted: 2021-01-15

## 2021-01-15 RX ORDER — PANTOPRAZOLE SODIUM 40 MG/1
40 TABLET, DELAYED RELEASE ORAL DAILY
Qty: 30 TABLET | Refills: 5 | Status: SHIPPED | OUTPATIENT
Start: 2021-01-15 | End: 2021-07-27

## 2021-01-15 NOTE — PATIENT INSTRUCTIONS
Patient Instructions   Problem List Items Addressed This Visit        Cardiac and Vasculature    Mixed hyperlipidemia    Overview     1/12/2021 Sherri Anna MD    Continue to improve low-fat low sugar diet.  Resume regular exercise.         Benign essential hypertension    Overview     1/12/2021 Sherri Anna MD    Continue lisinopril/hydrochlorothiazide daily.     Continue to avoid salt in the diet and avoid sodas. Resume exercise.          Relevant Medications    lisinopril-hydrochlorothiazide (Zestoretic) 10-12.5 MG per tablet       Gastrointestinal Abdominal     Gastroesophageal reflux disease without esophagitis    Overview     1/15/2021 Sherri Anna MD    Take pantoprazole daily every day.      Referral to Dr. Lopez.    Use a wedge pillow to elevate the head at night.  Avoid eating close to bedtime.  Avoid large meals.  Avoid food triggers.         Relevant Medications    pantoprazole (PROTONIX) 40 MG EC tablet    Other Relevant Orders    Ambulatory Referral to Gastroenterology (Completed)    Esophageal dysphagia - Primary    Overview     1/12/2021 Sherri Anna MD    Resume pantoprazole daily.  Use wedge pillow for sleep. Avoid eating close to bedtime and avoid large meals.  Decrease caffeine and chocolate.     Refer to Dr. Lopez.         Relevant Medications    pantoprazole (PROTONIX) 40 MG EC tablet    Other Relevant Orders    Ambulatory Referral to Gastroenterology (Completed)       Skin    Actinic keratosis    Overview     1/15/2021 Sherri Anna MD    Small flat erythematous mildly scaling lesion on the right bridge of nose.    He will see his dermatologist.         Relevant Medications    hydrocortisone 2.5 % cream    finasteride (PROPECIA) 1 MG tablet       Other    Overweight with body mass index (BMI) of 29 to 29.9 in adult (Chronic)    Overview     1/12/2021 Sherri Anna MD    Increase exercise and walking.  Continue to improve low fat low sugar diet and eat smaller  portions at mealtime.    Try to lose about 10 lb. Weigh every Monday morning to monitor progress.                    Dysphagia    Dysphagia is trouble swallowing. This condition occurs when solids and liquids stick in a person's throat on the way down to the stomach, or when food takes longer to get to the stomach than usual.  You may have problems swallowing food, liquids, or both. You may also have pain while trying to swallow. It may take you more time and effort to swallow something.  What are the causes?  This condition may be caused by:  · Muscle problems. They may make it difficult for you to move food and liquids through the esophagus, which is the tube that connects your mouth to your stomach.  · Blockages. You may have ulcers, scar tissue, or inflammation that blocks the normal passage of food and liquids. Causes of these problems include:  ? Acid reflux from your stomach into your esophagus (gastroesophageal reflux).  ? Infections.  ? Radiation treatment for cancer.  ? Medicines taken without enough fluids to wash them down into your stomach.  · Stroke. This can affect the nerves and make it difficult to swallow.  · Nerve problems. These prevent signals from being sent to the muscles of your esophagus to squeeze (contract) and move what you swallow down to your stomach.  · Globus pharyngeus. This is a common problem that involves a feeling like something is stuck in your throat or a sense of trouble with swallowing, even though nothing is wrong with the swallowing passages.  · Certain conditions, such as cerebral palsy or Parkinson's disease.  What are the signs or symptoms?  Common symptoms of this condition include:  · A feeling that solids or liquids are stuck in your throat on the way down to the stomach.  · Pain while swallowing.  · Coughing or gagging while trying to swallow.  Other symptoms include:  · Food moving back from your stomach to your mouth (regurgitation).  · Noises coming from your  throat.  · Chest discomfort with swallowing.  · A feeling of fullness when swallowing.  · Drooling, especially when the throat is blocked.  · Heartburn.  How is this diagnosed?  This condition may be diagnosed by:  · Barium X-ray. In this test, you will swallow a white liquid that sticks to the inside of your esophagus. X-ray images are then taken.  · Endoscopy. In this test, a flexible telescope is inserted down your throat to look at your esophagus and your stomach.  · CT scans and an MRI.  How is this treated?  Treatment for dysphagia depends on the cause of this condition, such as:  · If the dysphagia is caused by acid reflux or infection, medicines may be used. They may include antibiotics and heartburn medicines.  · If the dysphagia is caused by problems with the muscles, swallowing therapy may be used to help you strengthen your swallowing muscles. You may have to do specific exercises to strengthen the muscles or stretch them.  · If the dysphagia is caused by a blockage or mass, procedures to remove the blockage may be done. You may need surgery and a feeding tube.  You may need to make diet changes. Ask your health care provider for specific instructions.  Follow these instructions at home:  Medicines  · Take over-the-counter and prescription medicines only as told by your health care provider.  · If you were prescribed an antibiotic medicine, take it as told by your health care provider. Do not stop taking the antibiotic even if you start to feel better.  Eating and drinking    · Follow any diet changes as told by your health care provider.  · Work with a diet and nutrition specialist (dietitian) to create an eating plan that will help you get the nutrients you need in order to stay healthy.  · Eat soft foods that are easier to swallow.  · Cut your food into small pieces and eat slowly. Take small bites.  · Eat and drink only when you are sitting upright.  · Do not drink alcohol or caffeine. If you need  help quitting, ask your health care provider.  General instructions  · Check your weight every day to make sure you are not losing weight.  · Do not use any products that contain nicotine or tobacco, such as cigarettes, e-cigarettes, and chewing tobacco. If you need help quitting, ask your health care provider.  · Keep all follow-up visits as told by your health care provider. This is important.  Contact a health care provider if you:  · Lose weight because you cannot swallow.  · Cough when you drink liquids.  · Cough up partially digested food.  Get help right away if you:  · Cannot swallow your saliva.  · Have shortness of breath, a fever, or both.  · Have a hoarse voice and also have trouble swallowing.  Summary  · Dysphagia is trouble swallowing. This condition occurs when solids and liquids stick in a person's throat on the way down to the stomach. You may cough or gag while trying to swallow.  · Dysphagia has many possible causes.  · Treatment for dysphagia depends on the cause of the condition.  · Keep all follow-up visits as told by your health care provider. This is important.  This information is not intended to replace advice given to you by your health care provider. Make sure you discuss any questions you have with your health care provider.  Document Revised: 05/13/2020 Document Reviewed: 05/13/2020  [employer] Patient Education © 2020 [employer]

## 2021-03-02 ENCOUNTER — IMMUNIZATION (OUTPATIENT)
Dept: VACCINE CLINIC | Facility: HOSPITAL | Age: 46
End: 2021-03-02

## 2021-03-02 PROCEDURE — 0001A: CPT | Performed by: INTERNAL MEDICINE

## 2021-03-02 PROCEDURE — 91300 HC SARSCOV02 VAC 30MCG/0.3ML IM: CPT | Performed by: INTERNAL MEDICINE

## 2021-03-23 ENCOUNTER — IMMUNIZATION (OUTPATIENT)
Dept: VACCINE CLINIC | Facility: HOSPITAL | Age: 46
End: 2021-03-23

## 2021-03-23 PROCEDURE — 0002A: CPT | Performed by: INTERNAL MEDICINE

## 2021-03-23 PROCEDURE — 91300 HC SARSCOV02 VAC 30MCG/0.3ML IM: CPT | Performed by: INTERNAL MEDICINE

## 2021-07-24 DIAGNOSIS — I10 BENIGN ESSENTIAL HYPERTENSION: ICD-10-CM

## 2021-07-26 RX ORDER — LISINOPRIL AND HYDROCHLOROTHIAZIDE 12.5; 1 MG/1; MG/1
TABLET ORAL
Qty: 90 TABLET | Refills: 0 | Status: SHIPPED | OUTPATIENT
Start: 2021-07-26 | End: 2021-07-27 | Stop reason: SDUPTHER

## 2021-07-27 ENCOUNTER — OFFICE VISIT (OUTPATIENT)
Dept: INTERNAL MEDICINE | Facility: CLINIC | Age: 46
End: 2021-07-27

## 2021-07-27 VITALS
HEIGHT: 73 IN | TEMPERATURE: 98.7 F | DIASTOLIC BLOOD PRESSURE: 80 MMHG | HEART RATE: 60 BPM | OXYGEN SATURATION: 96 % | SYSTOLIC BLOOD PRESSURE: 110 MMHG | RESPIRATION RATE: 16 BRPM | WEIGHT: 217.8 LBS | BODY MASS INDEX: 28.86 KG/M2

## 2021-07-27 DIAGNOSIS — Z00.00 ANNUAL PHYSICAL EXAM: Primary | ICD-10-CM

## 2021-07-27 DIAGNOSIS — R13.19 ESOPHAGEAL DYSPHAGIA: ICD-10-CM

## 2021-07-27 DIAGNOSIS — E66.3 OVERWEIGHT WITH BODY MASS INDEX (BMI) OF 28 TO 28.9 IN ADULT: Chronic | ICD-10-CM

## 2021-07-27 DIAGNOSIS — E78.2 MIXED HYPERLIPIDEMIA: ICD-10-CM

## 2021-07-27 DIAGNOSIS — K21.9 GASTROESOPHAGEAL REFLUX DISEASE WITHOUT ESOPHAGITIS: ICD-10-CM

## 2021-07-27 DIAGNOSIS — I10 BENIGN ESSENTIAL HYPERTENSION: ICD-10-CM

## 2021-07-27 PROBLEM — F43.0 ACUTE STRESS DISORDER: Status: RESOLVED | Noted: 2019-02-06 | Resolved: 2021-07-27

## 2021-07-27 LAB
ALBUMIN SERPL-MCNC: 4.7 G/DL (ref 3.5–5.2)
ALBUMIN/GLOB SERPL: 2 G/DL
ALP SERPL-CCNC: 44 U/L (ref 39–117)
ALT SERPL-CCNC: 27 U/L (ref 1–41)
AST SERPL-CCNC: 22 U/L (ref 1–40)
BASOPHILS # BLD AUTO: 0.04 10*3/MM3 (ref 0–0.2)
BASOPHILS NFR BLD AUTO: 0.8 % (ref 0–1.5)
BILIRUB SERPL-MCNC: 0.7 MG/DL (ref 0–1.2)
BUN SERPL-MCNC: 19 MG/DL (ref 6–20)
BUN/CREAT SERPL: 19.2 (ref 7–25)
CALCIUM SERPL-MCNC: 9.6 MG/DL (ref 8.6–10.5)
CHLORIDE SERPL-SCNC: 104 MMOL/L (ref 98–107)
CHOLEST SERPL-MCNC: 174 MG/DL (ref 0–200)
CO2 SERPL-SCNC: 26.9 MMOL/L (ref 22–29)
CREAT SERPL-MCNC: 0.99 MG/DL (ref 0.76–1.27)
EOSINOPHIL # BLD AUTO: 0.23 10*3/MM3 (ref 0–0.4)
EOSINOPHIL NFR BLD AUTO: 4.5 % (ref 0.3–6.2)
ERYTHROCYTE [DISTWIDTH] IN BLOOD BY AUTOMATED COUNT: 12.7 % (ref 12.3–15.4)
GLOBULIN SER CALC-MCNC: 2.4 GM/DL
GLUCOSE SERPL-MCNC: 96 MG/DL (ref 65–99)
HCT VFR BLD AUTO: 47 % (ref 37.5–51)
HDLC SERPL-MCNC: 41 MG/DL (ref 40–60)
HGB BLD-MCNC: 15.9 G/DL (ref 13–17.7)
IMM GRANULOCYTES # BLD AUTO: 0.01 10*3/MM3 (ref 0–0.05)
IMM GRANULOCYTES NFR BLD AUTO: 0.2 % (ref 0–0.5)
LDLC SERPL CALC-MCNC: 105 MG/DL (ref 0–100)
LYMPHOCYTES # BLD AUTO: 1.31 10*3/MM3 (ref 0.7–3.1)
LYMPHOCYTES NFR BLD AUTO: 25.5 % (ref 19.6–45.3)
MCH RBC QN AUTO: 29.5 PG (ref 26.6–33)
MCHC RBC AUTO-ENTMCNC: 33.8 G/DL (ref 31.5–35.7)
MCV RBC AUTO: 87.2 FL (ref 79–97)
MONOCYTES # BLD AUTO: 0.43 10*3/MM3 (ref 0.1–0.9)
MONOCYTES NFR BLD AUTO: 8.4 % (ref 5–12)
NEUTROPHILS # BLD AUTO: 3.12 10*3/MM3 (ref 1.7–7)
NEUTROPHILS NFR BLD AUTO: 60.6 % (ref 42.7–76)
NRBC BLD AUTO-RTO: 0.2 /100 WBC (ref 0–0.2)
PLATELET # BLD AUTO: 257 10*3/MM3 (ref 140–450)
POTASSIUM SERPL-SCNC: 4.2 MMOL/L (ref 3.5–5.2)
PROT SERPL-MCNC: 7.1 G/DL (ref 6–8.5)
RBC # BLD AUTO: 5.39 10*6/MM3 (ref 4.14–5.8)
SODIUM SERPL-SCNC: 139 MMOL/L (ref 136–145)
TRIGL SERPL-MCNC: 156 MG/DL (ref 0–150)
TSH SERPL DL<=0.005 MIU/L-ACNC: 0.81 UIU/ML (ref 0.27–4.2)
VLDLC SERPL CALC-MCNC: 28 MG/DL (ref 5–40)
WBC # BLD AUTO: 5.14 10*3/MM3 (ref 3.4–10.8)

## 2021-07-27 PROCEDURE — 93000 ELECTROCARDIOGRAM COMPLETE: CPT | Performed by: INTERNAL MEDICINE

## 2021-07-27 PROCEDURE — 99396 PREV VISIT EST AGE 40-64: CPT | Performed by: INTERNAL MEDICINE

## 2021-07-27 RX ORDER — FAMOTIDINE 40 MG/1
40 TABLET, FILM COATED ORAL DAILY
Qty: 90 TABLET | Refills: 3 | Status: SHIPPED | OUTPATIENT
Start: 2021-07-27 | End: 2022-11-18

## 2021-07-27 RX ORDER — LISINOPRIL AND HYDROCHLOROTHIAZIDE 12.5; 1 MG/1; MG/1
1 TABLET ORAL DAILY
Qty: 90 TABLET | Refills: 3 | Status: SHIPPED | OUTPATIENT
Start: 2021-07-27 | End: 2021-10-27

## 2021-07-27 NOTE — PROGRESS NOTES
Preventative Annual Visit    Colin Cheema  1975   [id number]    Patient Care Team:  Sherri Anna MD as PCP - Internal Medicine (Internal Medicine)    Chief Complaint::   Chief Complaint   Patient presents with   • Annual Exam   • Hyperlipidemia     f/u   • Hypertension        Subjective   History of Present Illness    Colin Cheema is a 46 y.o. male who presents for an Annual Wellness Visit.    CHRONIC CONDITIONS    BPs at home 125-130/70s. But he doesn't rest before checking.  He is trying to avoid salt and fats in the diet.  For hyperlipidemia, he does try to exercise and walk regularly.  He states that sometimes it is hit or miss.    GERD symptoms on and off.  He has not been taking pantoprazole daily.  He has been taking famotidine half tablet occasionally as needed.  He has found that watching food triggers and avoiding eating late helps his symptoms a lot.  He seems to have had less dysphagia episodes.  He states that he can headed off by paying attention to the symptoms starting and taking half of famotidine.    Patient Active Problem List   Diagnosis   • Back pain without radiculopathy   • Gastroesophageal reflux disease without esophagitis   • Esophageal dysphagia   • B12 deficiency   • Mixed hyperlipidemia   • Benign essential hypertension   • Seborrheic dermatitis   • Left-sided low back pain with sciatica   • Plantar fasciitis   • Fatigue   • Allergic rhinitis   • BMI 28.0-28.9,adult   • Annual physical exam   • Actinic keratosis   • Overweight with body mass index (BMI) of 28 to 28.9 in adult        Past Medical History:   Diagnosis Date   • Achilles bursitis or tendinitis 2/6/2019   • Acute stress disorder 2/6/2019   • Allergic rhinitis    • GERD (gastroesophageal reflux disease)    • Hyperlipidemia    • Hypertension    • Low back pain    • Numbness and tingling in left arm 12/29/2015   • Piriformis syndrome, left 05/06/2016   • Plantar fasciitis 02/12/2016    left foot   •  Vitamin B 12 deficiency        Past Surgical History:   Procedure Laterality Date   • HERNIA REPAIR Bilateral 2001    Inguinal       Family History   Problem Relation Age of Onset   • Testicular cancer Father    • Colon cancer Other        Social History     Socioeconomic History   • Marital status:      Spouse name: Not on file   • Number of children: Not on file   • Years of education: Not on file   • Highest education level: Not on file   Tobacco Use   • Smoking status: Never Smoker   • Smokeless tobacco: Never Used   Substance and Sexual Activity   • Alcohol use: Yes     Comment: socially       Allergies   Allergen Reactions   • Loratadine Unknown (See Comments)     Unknown reaction         Current Outpatient Medications:   •  finasteride (PROPECIA) 1 MG tablet, Take 1 tablet by mouth Daily. (Patient taking differently: Take 1 mg by mouth Daily. Taking 1/2 tablet twice weekly), Disp: 30 tablet, Rfl: 0  •  lisinopril-hydrochlorothiazide (PRINZIDE,ZESTORETIC) 10-12.5 MG per tablet, Take 1 tablet by mouth Daily., Disp: 90 tablet, Rfl: 3  •  famotidine (PEPCID) 40 MG tablet, Take 1 tablet by mouth Daily., Disp: 90 tablet, Rfl: 3  •  hydrocortisone 2.5 % cream, , Disp: , Rfl:   •  ibuprofen (ADVIL,MOTRIN) 600 MG tablet, Take 1 tablet by mouth Every 6 (Six) Hours As Needed for Mild Pain ., Disp: 30 tablet, Rfl: 0    Immunization History   Administered Date(s) Administered   • COVID-19 (PFIZER) 03/02/2021, 03/23/2021   • Hepatitis A 08/22/2018   • Influenza, Unspecified 09/16/2020   • Tdap 12/29/2015        There are no preventive care reminders to display for this patient.     Review of Systems   Constitutional: Negative for chills, fatigue and fever.   HENT: Negative for congestion, ear pain and sinus pressure.    Eyes: Negative for visual disturbance.   Respiratory: Negative for cough, chest tightness, shortness of breath and wheezing.    Cardiovascular: Negative for chest pain, palpitations and leg swelling.  "  Gastrointestinal: Negative for abdominal pain, blood in stool and constipation.   Endocrine: Negative for cold intolerance and heat intolerance.   Genitourinary: Negative for dysuria and frequency.   Musculoskeletal: Negative for arthralgias, back pain and gait problem.   Skin: Negative for color change.   Allergic/Immunologic: Negative for environmental allergies.   Neurological: Negative for dizziness and headache.   Hematological: Negative for adenopathy. Does not bruise/bleed easily.   Psychiatric/Behavioral: Negative for suicidal ideas and depressed mood. The patient is not nervous/anxious.         Vital Signs  Vitals:    07/27/21 0808   BP: 110/80   BP Location: Left arm   Patient Position: Sitting   Cuff Size: Adult   Pulse: 60   Resp: 16   Temp: 98.7 °F (37.1 °C)   TempSrc: Infrared   SpO2: 96%   Weight: 98.8 kg (217 lb 12.8 oz)   Height: 185.4 cm (73\")   PainSc: 0-No pain     Patient's Body mass index is 28.74 kg/m². indicating that he is overweight (BMI 25-29.9). Obesity-related health conditions include the following: hypertension and dyslipidemias. Obesity is unchanged. BMI is is above average; BMI management plan is completed. We discussed low calorie, low carb based diet program, portion control and increasing exercise..    Physical Exam  Vitals and nursing note reviewed.   Constitutional:       Appearance: He is well-developed and overweight.   HENT:      Head: Normocephalic.   Eyes:      Conjunctiva/sclera: Conjunctivae normal.      Pupils: Pupils are equal, round, and reactive to light.   Neck:      Thyroid: No thyromegaly.   Cardiovascular:      Rate and Rhythm: Normal rate and regular rhythm.      Heart sounds: Normal heart sounds.   Pulmonary:      Effort: Pulmonary effort is normal.      Breath sounds: Normal breath sounds. No wheezing.   Abdominal:      General: Bowel sounds are normal.      Palpations: Abdomen is soft.      Tenderness: There is no abdominal tenderness.   Musculoskeletal:    "      General: No tenderness. Normal range of motion.      Cervical back: Normal range of motion and neck supple.   Lymphadenopathy:      Cervical: No cervical adenopathy.   Skin:     General: Skin is warm and dry.      Findings: No rash.   Neurological:      Mental Status: He is alert and oriented to person, place, and time.      Cranial Nerves: No cranial nerve deficit.      Sensory: No sensory deficit.      Coordination: Coordination normal.      Gait: Gait normal.   Psychiatric:         Attention and Perception: Attention normal.         Mood and Affect: Mood normal.         Speech: Speech normal.         Behavior: Behavior normal.         Thought Content: Thought content normal.         Cognition and Memory: Cognition normal.         Judgment: Judgment normal.            ECG 12 Lead    Date/Time: 7/27/2021 8:23 AM  Performed by: Sherri Anna MD  Authorized by: Sherri Anna MD   Comparison: compared with previous ECG   Similar to previous ECG  Rhythm: sinus rhythm  Rate: normal  BPM: 60  Conduction: conduction normal  ST Segments: ST segments normal  T Waves: T waves normal  QRS axis: normal    Clinical impression: normal ECG             Fall Risk Screen:  [facility] not [facility].    Health Habits and Functional and Cognitive Screening:  No flowsheet data found.    Smoking Status:  Social History     Tobacco Use   Smoking Status Never Smoker   Smokeless Tobacco Never Used       Alcohol Consumption:  Social History     Substance and Sexual Activity   Alcohol Use Yes    Comment: socially       Depression [place]  PHQ-9:    PHQ-2/PHQ-9 Depression Screening 7/27/2021   Little interest or pleasure in doing things 0   Feeling down, depressed, or hopeless 0   Trouble falling or staying asleep, or sleeping too much -   Feeling tired or having little energy -   Poor appetite or overeating -   Feeling bad about yourself - or that you are a failure or have let yourself or your family  down -   Trouble concentrating on things, such as reading the newspaper or watching television -   Moving or speaking so slowly that other people could have noticed. Or the opposite - being so fidgety or restless that you have been moving around a lot more than usual -   Thoughts that you would be better off dead, or of hurting yourself in some way -   Total Score 0   If you checked off any problems, how difficult have these problems made it for you to do your work, take care of things at home, or get along with other people? -      [facility] III [facility]I        Labs  Results for orders placed or performed in visit on 07/27/21   TSH    Specimen: Blood    BLOOD  RELEASE TO ESTRELLA   Result Value Ref Range    TSH 0.815 0.270 - 4.200 uIU/mL   Lipid Panel    Specimen: Blood    BLOOD  RELEASE TO ESTRELLA   Result Value Ref Range    Total Cholesterol 174 0 - 200 mg/dL    Triglycerides 156 (H) 0 - 150 mg/dL    HDL Cholesterol 41 40 - 60 mg/dL    VLDL Cholesterol Otoniel 28 5 - 40 mg/dL    LDL Chol Calc (NIH) 105 (H) 0 - 100 mg/dL   Comprehensive Metabolic Panel    Specimen: Blood    BLOOD  RELEASE TO ESTRELLA   Result Value Ref Range    Glucose 96 65 - 99 mg/dL    BUN 19 6 - 20 mg/dL    Creatinine 0.99 0.76 - 1.27 mg/dL    eGFR Non African Am 81 >60 mL/min/1.73    eGFR African Am 99 >60 mL/min/1.73    BUN/Creatinine Ratio 19.2 7.0 - 25.0    Sodium 139 136 - 145 mmol/L    Potassium 4.2 3.5 - 5.2 mmol/L    Chloride 104 98 - 107 mmol/L    Total CO2 26.9 22.0 - 29.0 mmol/L    Calcium 9.6 8.6 - 10.5 mg/dL    Total Protein 7.1 6.0 - 8.5 g/dL    Albumin 4.70 3.50 - 5.20 g/dL    Globulin 2.4 gm/dL    A/G Ratio 2.0 g/dL    Total Bilirubin 0.7 0.0 - 1.2 mg/dL    Alkaline Phosphatase 44 39 - 117 U/L    AST (SGOT) 22 1 - 40 U/L    ALT (SGPT) 27 1 - 41 U/L   CBC & Differential    Specimen: Blood    BLOOD  RELEASE TO ESTRELLA   Result Value Ref Range    WBC 5.14 3.40 - 10.80 10*3/mm3    RBC 5.39 4.14 - 5.80 10*6/mm3    Hemoglobin 15.9 13.0 - 17.7 g/dL    Hematocrit  47.0 37.5 - 51.0 %    MCV 87.2 79.0 - 97.0 fL    MCH 29.5 26.6 - 33.0 pg    MCHC 33.8 31.5 - 35.7 g/dL    RDW 12.7 12.3 - 15.4 %    Platelets 257 140 - 450 10*3/mm3    Neutrophil Rel % 60.6 42.7 - 76.0 %    Lymphocyte Rel % 25.5 19.6 - 45.3 %    Monocyte Rel % 8.4 5.0 - 12.0 %    Eosinophil Rel % 4.5 0.3 - 6.2 %    Basophil Rel % 0.8 0.0 - 1.5 %    Neutrophils Absolute 3.12 1.70 - 7.00 10*3/mm3    Lymphocytes Absolute 1.31 0.70 - 3.10 10*3/mm3    Monocytes Absolute 0.43 0.10 - 0.90 10*3/mm3    Eosinophils Absolute 0.23 0.00 - 0.40 10*3/mm3    Basophils Absolute 0.04 0.00 - 0.20 10*3/mm3    Immature Granulocyte Rel % 0.2 0.0 - 0.5 %    Immature Grans Absolute 0.01 0.00 - 0.05 10*3/mm3    nRBC 0.2 0.0 - 0.2 /100 WBC        Assessment/Plan     Patient Self-Management and Personalized Health Advice    The patient has been provided counseling and guidance about: diet, exercise, weight management and prevention of cardiac or vascular disease and preventive services including:   · Annual Wellness Visit (AWV).  Patient Instructions   Problem List Items Addressed This Visit        Cardiac and Vasculature    Mixed hyperlipidemia    Overview     7/27/2021 Sherri Anna MD    Continue to improve low-fat low sugar diet.  Resume regular exercise.         Relevant Orders    Lipid Panel (Completed)    TSH (Completed)    Benign essential hypertension    Overview     7/27/2021 Sherri Anna MD    Continue lisinopril/hydrochlorothiazide daily.     Continue to avoid salt in the diet and avoid sodas. Resume exercise.          Relevant Medications    lisinopril-hydrochlorothiazide (PRINZIDE,ZESTORETIC) 10-12.5 MG per tablet    Other Relevant Orders    CBC & Differential (Completed)    Comprehensive Metabolic Panel (Completed)    Microalbumin / Creatinine Urine Ratio - Urine, Clean Catch    Urinalysis With Microscopic - Urine, Clean Catch    ECG 12 Lead (Completed)       Endocrine and Metabolic    BMI 28.0-28.9,adult (Chronic)     Overview     7/27/2021 Sherri Anna MD    Continue exercise and walking.  Continue to improve low fat low sugar diet and eat smaller portions at mealtime.    Try to lose about 10 lb over the next 6 months. Weigh every Monday morning to monitor progress.              Gastrointestinal Abdominal     Gastroesophageal reflux disease without esophagitis    Overview     7/27/2021 Sherri Anna MD.      EGD by Dr. Lopez 2/20/2021 showing diffuse esophagitis.    Patient is reluctant to take pantoprazole daily, so we decided on famotidine 40 mg tablet daily to decrease acid production.    Use a wedge pillow to elevate the head at night.  Avoid eating close to bedtime.  Avoid large meals.  Avoid alcohol after dinner.  Avoid food triggers.         Relevant Medications    famotidine (PEPCID) 40 MG tablet    Esophageal dysphagia    Overview     7/27/2021 Sherri Anna MD    Episodes seem to be occurring less.  He has been able to abort some episodes by paying attention to the symptoms.         Relevant Medications    famotidine (PEPCID) 40 MG tablet       Health Encounters    Annual physical exam - Primary    Overview     Get a flu shot early September.  Second Hepatitis A vaccine also due.    Colonoscopy,traditionally recommended at age 50, is now being recommended earlier. We will discuss at next visit.            Other    Overweight with body mass index (BMI) of 28 to 28.9 in adult (Chronic)    Overview     Continue exercise and walking.  Continue to improve low fat low sugar diet and eat smaller portions at mealtime.    Try to lose about 10 lb over the next 6 months. Weigh every Monday morning to monitor progress.                      Diagnosis Plan   1. Annual physical exam     2. Benign essential hypertension  CBC & Differential    Comprehensive Metabolic Panel    Microalbumin / Creatinine Urine Ratio - Urine, Clean Catch    Urinalysis With Microscopic - Urine, Clean Catch    lisinopril-hydrochlorothiazide  (PRINZIDE,ZESTORETIC) 10-12.5 MG per tablet    Comprehensive Metabolic Panel    CBC & Differential    ECG 12 Lead   3. Mixed hyperlipidemia  Lipid Panel    TSH    TSH    Lipid Panel   4. Gastroesophageal reflux disease without esophagitis     5. Esophageal dysphagia     6. BMI 28.0-28.9,adult     7. Overweight with body mass index (BMI) of 28 to 28.9 in adult         Outpatient Encounter Medications as of 7/27/2021   Medication Sig Dispense Refill   • finasteride (PROPECIA) 1 MG tablet Take 1 tablet by mouth Daily. (Patient taking differently: Take 1 mg by mouth Daily. Taking 1/2 tablet twice weekly) 30 tablet 0   • lisinopril-hydrochlorothiazide (PRINZIDE,ZESTORETIC) 10-12.5 MG per tablet Take 1 tablet by mouth Daily. 90 tablet 3   • [DISCONTINUED] lisinopril-hydrochlorothiazide (PRINZIDE,ZESTORETIC) 10-12.5 MG per tablet TAKE ONE TABLET BY MOUTH DAILY 90 tablet 0   • famotidine (PEPCID) 40 MG tablet Take 1 tablet by mouth Daily. 90 tablet 3   • hydrocortisone 2.5 % cream      • ibuprofen (ADVIL,MOTRIN) 600 MG tablet Take 1 tablet by mouth Every 6 (Six) Hours As Needed for Mild Pain . 30 tablet 0   • [DISCONTINUED] pantoprazole (PROTONIX) 40 MG EC tablet Take 1 tablet by mouth Daily. 30 tablet 5     No facility-administered encounter medications on file as of 7/27/2021.         Age appropriate preventive counseling done including age appropriate  colonoscopy, regular dental visits, mental health, injury prevention such as wearing seat belt and preventing falls, healthy  nutrition, healthy weight, regular physical exercise. Alcohol use is moderate.  Tobacco history-none. Drug use-none.  STD's-not at risk.    Follow Up:  Return in about 6 months (around 1/27/2022) for recheck fasting.         An After Visit Summary and PPPS with all of these plans were given to the patient.      Note: Part of this note may be an electronic transcription/translation of spoken language to printed text using the Dragon Dictation System.      Sherri Anna MD

## 2021-07-29 NOTE — PATIENT INSTRUCTIONS
Patient Instructions   Problem List Items Addressed This Visit        Cardiac and Vasculature    Mixed hyperlipidemia    Overview     7/27/2021 Sherri Anna MD    Continue to improve low-fat low sugar diet.  Resume regular exercise.         Relevant Orders    Lipid Panel (Completed)    TSH (Completed)    Benign essential hypertension    Overview     7/27/2021 Sherri Anna MD    Continue lisinopril/hydrochlorothiazide daily.     Continue to avoid salt in the diet and avoid sodas. Resume exercise.          Relevant Medications    lisinopril-hydrochlorothiazide (PRINZIDE,ZESTORETIC) 10-12.5 MG per tablet    Other Relevant Orders    CBC & Differential (Completed)    Comprehensive Metabolic Panel (Completed)    Microalbumin / Creatinine Urine Ratio - Urine, Clean Catch    Urinalysis With Microscopic - Urine, Clean Catch       Endocrine and Metabolic    BMI 28.0-28.9,adult (Chronic)    Overview     7/27/2021 Sherri Anna MD    Continue exercise and walking.  Continue to improve low fat low sugar diet and eat smaller portions at mealtime.    Try to lose about 10 lb over the next 6 months. Weigh every Monday morning to monitor progress.              Gastrointestinal Abdominal     Gastroesophageal reflux disease without esophagitis    Overview     7/27/2021 Sherri Anna MD.      EGD by Dr. Lopez 2/20/2021 showing diffuse esophagitis.    Patient is reluctant to take pantoprazole daily, so we decided on famotidine 40 mg tablet daily to decrease acid production.    Use a wedge pillow to elevate the head at night.  Avoid eating close to bedtime.  Avoid large meals.  Avoid alcohol after dinner.  Avoid food triggers.         Relevant Medications    famotidine (PEPCID) 40 MG tablet    Esophageal dysphagia    Overview     7/27/2021 Sherri Anna MD    Episodes seem to be occurring less.  He has been able to abort some episodes by paying attention to the symptoms.         Relevant Medications    famotidine  (PEPCID) 40 MG tablet       Health Encounters    Annual physical exam - Primary    Overview     Get a flu shot early September.  Second Hepatitis A vaccine also due.    Colonoscopy,traditionally recommended at age 50, is now being recommended earlier. We will discuss at next visit.                  BMI for Adults  What is BMI?  Body mass index (BMI) is a number that is calculated from a person's weight and height. BMI can help estimate how much of a person's weight is composed of fat. BMI does not measure body fat directly. Rather, it is an alternative to procedures that directly measure body fat, which can be difficult and expensive.  BMI can help identify people who may be at higher risk for certain medical problems.  What are BMI measurements used for?  BMI is used as a screening tool to identify possible weight problems. It helps determine whether a person is obese, overweight, a healthy weight, or underweight.  BMI is useful for:  · Identifying a weight problem that may be related to a medical condition or may increase the risk for medical problems.  · Promoting changes, such as changes in diet and exercise, to help reach a healthy weight. BMI screening can be repeated to see if these changes are working.  How is BMI calculated?  BMI involves measuring your weight in relation to your height. Both height and weight are measured, and the BMI is calculated from those numbers. This can be done either in English ([place] or metric measurements. Note that charts and online BMI calculators are available to help you find your BMI quickly and easily without having to do these calculations yourself.  To calculate your BMI in English ([place] measurements:    1. Measure your weight in pounds (lb).  2. Multiply the number of pounds by 703.  ? For example, for a person who weighs 180 lb, multiply that number by 703, which equals 126,540.  3. Measure your height in inches. Then multiply that number by itself to get a measurement  "called \"inches squared.\"  ? For example, for a person who is 70 inches tall, the \"inches squared\" measurement is 70 inches x 70 inches, which equals 4,900 inches squared.  4. Divide the total from step 2 (number of lb x 703) by the total from step 3 (inches squared): 126,540 ÷ 4,900 = 25.8. This is your BMI.  To calculate your BMI in metric measurements:  1. Measure your weight in kilograms (kg).  2. Measure your height in meters (m). Then multiply that number by itself to get a measurement called \"meters squared.\"  ? For example, for a person who is 1.75 m tall, the \"meters squared\" measurement is 1.75 m x 1.75 m, which is equal to 3.1 meters squared.  3. Divide the number of kilograms (your weight) by the meters squared number. In this example: 70 ÷ 3.1 = 22.6. This is your BMI.  What do the results mean?  BMI charts are used to identify whether you are underweight, normal weight, overweight, or obese. The following guidelines will be used:  · Underweight: BMI less than 18.5.  · Normal weight: BMI between 18.5 and 24.9.  · Overweight: BMI between 25 and 29.9.  · Obese: BMI of 30 or above.  Keep these notes in mind:  · Weight includes both fat and muscle, so someone with a muscular build, such as an athlete, may have a BMI that is higher than 24.9. In cases like these, BMI is not an accurate measure of body fat.  · To determine if excess body fat is the cause of a BMI of 25 or higher, further assessments may need to be done by a health care provider.  · BMI is usually interpreted in the same way for men and women.  Where to find more information  For more information about BMI, including tools to quickly calculate your BMI, go to these websites:  · Centers for Disease Control and Prevention: www.cdc.gov  · American Heart Association: www.heart.org  · National Heart, Lung, and Blood [place]: www.nhlbi.nih.gov  Summary  · Body mass index (BMI) is a number that is calculated from a person's weight and height.  · BMI " may help estimate how much of a person's weight is composed of fat. BMI can help identify those who may be at higher risk for certain medical problems.  · BMI can be measured using English measurements or metric measurements.  · BMI charts are used to identify whether you are underweight, normal weight, overweight, or obese.  This information is not intended to replace advice given to you by your health care provider. Make sure you discuss any questions you have with your health care provider.  Document Revised: 09/09/2020 Document Reviewed: 07/17/2020  Elsevier Patient Education © 2021 [employer]    Calorie Counting for Weight Loss  Calories are units of energy. Your body needs a certain number of calories from food to keep going throughout the day. When you eat or drink more calories than your body needs, your body stores the extra calories mostly as fat. When you eat or drink fewer calories than your body needs, your body burns fat to get the energy it needs.  Calorie counting means keeping track of how many calories you eat and drink each day. Calorie counting can be helpful if you need to lose weight. If you eat fewer calories than your body needs, you should lose weight. Ask your health care provider what a healthy weight is for you.  For calorie counting to work, you will need to eat the right number of calories each day to lose a healthy amount of weight per week. A dietitian can help you figure out how many calories you need in a day and will suggest ways to reach your calorie goal.  · A healthy amount of weight to lose each week is usually 1-2 lb (0.5-0.9 kg). This usually means that your daily calorie intake should be reduced by 500-750 calories.  · Eating 1,200-1,500 calories a day can help most women lose weight.  · Eating 1,500-1,800 calories a day can help most men lose weight.  What do I need to know about calorie counting?  Work with your health care provider or dietitian to determine how many  calories you should get each day. To meet your daily calorie goal, you will need to:  · Find out how many calories are in each food that you would like to eat. Try to do this before you eat.  · Decide how much of the food you plan to eat.  · Keep a food log. Do this by writing down what you ate and how many calories it had.  To successfully lose weight, it is important to balance calorie counting with a healthy lifestyle that includes regular activity.  Where do I find calorie information?    The number of calories in a food can be found on a Nutrition Facts label. If a food does not have a Nutrition Facts label, try to look up the calories online or ask your dietitian for help.  Remember that calories are listed per serving. If you choose to have more than one serving of a food, you will have to multiply the calories per serving by the number of servings you plan to eat. For example, the label on a package of bread might say that a serving size is 1 slice and that there are 90 calories in a serving. If you eat 1 slice, you will have eaten 90 calories. If you eat 2 slices, you will have eaten 180 calories.  How do I keep a food log?  After each time that you eat, record the following in your food log as soon as possible:  · What you ate. Be sure to include toppings, sauces, and other extras on the food.  · How much you ate. This can be measured in cups, ounces, or number of items.  · How many calories were in each food and drink.  · The total number of calories in the food you ate.  Keep your food log near you, such as in a pocket-sized notebook or on an linda or website on your mobile phone. Some programs will calculate calories for you and show you how many calories you have left to meet your daily goal.  What are some portion-control tips?  · Know how many calories are in a serving. This will help you know how many servings you can have of a certain food.  · Use a measuring cup to measure serving sizes. You could  also try weighing out portions on a kitchen scale. With time, you will be able to estimate serving sizes for some foods.  · Take time to put servings of different foods on your favorite plates or in your favorite bowls and cups so you know what a serving looks like.  · Try not to eat straight from a food's packaging, such as from a bag or box. Eating straight from the package makes it hard to see how much you are eating and can lead to overeating. Put the amount you would like to eat in a cup or on a plate to make sure you are eating the right portion.  · Use smaller plates, glasses, and bowls for smaller portions and to prevent overeating.  · Try not to multitask. For example, avoid watching TV or using your computer while eating. If it is time to eat, sit down at a table and enjoy your food. This will help you recognize when you are full. It will also help you be more mindful of what and how much you are eating.  What are tips for following this plan?  Reading food labels  · Check the calorie count compared with the serving size. The serving size may be smaller than what you are used to eating.  · Check the source of the calories. Try to choose foods that are high in protein, fiber, and vitamins, and low in saturated fat, trans fat, and sodium.  Shopping  · Read nutrition labels while you shop. This will help you make healthy decisions about which foods to buy.  · Pay attention to nutrition labels for low-fat or fat-free foods. These foods sometimes have the same number of calories or more calories than the full-fat versions. They also often have added sugar, starch, or salt to make up for flavor that was removed with the fat.  · Make a grocery list of lower-calorie foods and stick to it.  Cooking  · Try to cook your favorite foods in a healthier way. For example, try baking instead of frying.  · Use low-fat dairy products.  Meal planning  · Use more fruits and vegetables. One-half of your plate should be fruits  and vegetables.  · Include lean proteins, such as chicken, turkey, and fish.  Lifestyle  Each week, aim to do one of the following:  · 150 minutes of moderate exercise, such as walking.  · 75 minutes of vigorous exercise, such as running.  General information  · Know how many calories are in the foods you eat most often. This will help you calculate calorie counts faster.  · Find a way of tracking calories that works for you. Get creative. Try different apps or programs if writing down calories does not work for you.  What foods should I eat?    · Eat nutritious foods. It is better to have a nutritious, high-calorie food, such as an avocado, than a food with few nutrients, such as a bag of potato chips.  · Use your calories on foods and drinks that will fill you up and will not leave you hungry soon after eating.  ? Examples of foods that fill you up are nuts and nut butters, vegetables, lean proteins, and high-fiber foods such as whole grains. High-fiber foods are foods with more than 5 g of fiber per serving.  · Pay attention to calories in drinks. Low-calorie drinks include water and unsweetened drinks.  The items listed above may not be a complete list of foods and beverages you can eat. Contact a dietitian for more information.  What foods should I limit?  Limit foods or drinks that are not good sources of vitamins, minerals, or protein or that are high in unhealthy fats. These include:  · Candy.  · Other sweets.  · Sodas, specialty coffee drinks, alcohol, and juice.  The items listed above may not be a complete list of foods and beverages you should avoid. Contact a dietitian for more information.  How do I count calories when eating out?  · Pay attention to portions. Often, portions are much larger when eating out. Try these tips to keep portions smaller:  ? Consider sharing a meal instead of getting your own.  ? If you get your own meal, eat only half of it. Before you start eating, ask for a container and  put half of your meal into it.  ? When available, consider ordering smaller portions from the menu instead of full portions.  · Pay attention to your food and drink choices. Knowing the way food is cooked and what is included with the meal can help you eat fewer calories.  ? If calories are listed on the menu, choose the lower-calorie options.  ? Choose dishes that include vegetables, fruits, whole grains, low-fat dairy products, and lean proteins.  ? Choose items that are boiled, broiled, grilled, or steamed. Avoid items that are buttered, battered, fried, or served with cream sauce. Items labeled as crispy are usually fried, unless stated otherwise.  ? Choose water, low-fat milk, unsweetened iced tea, or other drinks without added sugar. If you want an alcoholic beverage, choose a lower-calorie option, such as a glass of wine or light beer.  ? Ask for dressings, sauces, and syrups on the side. These are usually high in calories, so you should limit the amount you eat.  ? If you want a salad, choose a garden salad and ask for grilled meats. Avoid extra toppings such as khanna, cheese, or fried items. Ask for the dressing on the side, or ask for olive oil and vinegar or lemon to use as dressing.  · Estimate how many servings of a food you are given. Knowing serving sizes will help you be aware of how much food you are eating at restaurants.  Where to find more information  · Centers for Disease Control and Prevention: www.cdc.gov  · [place]. Department of Agriculture: myplate.gov  Summary  · Calorie counting means keeping track of how many calories you eat and drink each day. If you eat fewer calories than your body needs, you should lose weight.  · A healthy amount of weight to lose per week is usually 1-2 lb (0.5-0.9 kg). This usually means reducing your daily calorie intake by 500-750 calories.  · The number of calories in a food can be found on a Nutrition Facts label. If a food does not have a Nutrition Facts label,  try to look up the calories online or ask your dietitian for help.  · Use smaller plates, glasses, and bowls for smaller portions and to prevent overeating.  · Use your calories on foods and drinks that will fill you up and not leave you hungry shortly after a meal.  This information is not intended to replace advice given to you by your health care provider. Make sure you discuss any questions you have with your health care provider.  Document Revised: 01/28/2021 Document Reviewed: 01/28/2021  Elsevier Patient Education © 2021 [employer] Inc.

## 2021-08-06 PROBLEM — E66.3 OVERWEIGHT WITH BODY MASS INDEX (BMI) OF 28 TO 28.9 IN ADULT: Chronic | Status: ACTIVE | Noted: 2021-08-06

## 2021-08-25 ENCOUNTER — TELEPHONE (OUTPATIENT)
Dept: INTERNAL MEDICINE | Facility: CLINIC | Age: 46
End: 2021-08-25

## 2021-08-25 NOTE — TELEPHONE ENCOUNTER
"I called patient.  There's been some confusion about what the letter should say.  Patient got the covid test not because of symptoms, but because of exposure.  He's had a HA but that wouldn't have prevented him from working.   Please revise the letter to say \"patient can return to work on 8/30/21\" and put no mention of symptoms please.   OK to send this to his MyChart.    "

## 2021-08-25 NOTE — TELEPHONE ENCOUNTER
Caller: Colin Cheema    Relationship to patient: Self    Best call back number:     Date of exposure: 081321    Date of positive COVID19 test: 082321    Date if possible COVID19 exposure: 081321    COVID19 symptoms: HEADACHE    Date of initial quarantine: 082521    Additional information or concerns: PATIENT SAID HE NEEDS A NOTE FOR WORK STATING THAT HE IS STAYING AT HOME AND THAT HE KNOWS HE DIDN'T GET IT FROM THE PERSON AT WORK AS THEY HAVE TESTED NEGATIVE; PLEASE CALL TO ADVISE; TODAY IS THE PATIENTS FIRST DAY HOME; HE STATED HE ONLY HAS MILD SYMTPOMS

## 2021-08-27 RX ORDER — FINASTERIDE 1 MG/1
TABLET, FILM COATED ORAL
Qty: 30 TABLET | Refills: 0 | Status: SHIPPED | OUTPATIENT
Start: 2021-08-27 | End: 2022-09-16

## 2021-08-27 NOTE — TELEPHONE ENCOUNTER
Rx Refill Note  Requested Prescriptions     Pending Prescriptions Disp Refills   • finasteride (PROPECIA) 1 MG tablet [Pharmacy Med Name: FINASTERIDE 1 MG TABLET] 30 tablet 0     Sig: TAKE ONE TABLET BY MOUTH DAILY      Last office visit with prescribing clinician: 7/27/2021      Next office visit with prescribing clinician: 1/27/2022            Kimberly Judd LPN  08/27/21, 16:35 EDT

## 2021-10-27 DIAGNOSIS — I10 BENIGN ESSENTIAL HYPERTENSION: ICD-10-CM

## 2021-10-27 RX ORDER — LISINOPRIL AND HYDROCHLOROTHIAZIDE 12.5; 1 MG/1; MG/1
TABLET ORAL
Qty: 90 TABLET | Refills: 3 | Status: SHIPPED | OUTPATIENT
Start: 2021-10-27 | End: 2022-11-18 | Stop reason: SDUPTHER

## 2021-10-27 NOTE — TELEPHONE ENCOUNTER
Rx Refill Note  Requested Prescriptions     Pending Prescriptions Disp Refills   • lisinopril-hydrochlorothiazide (PRINZIDE,ZESTORETIC) 10-12.5 MG per tablet [Pharmacy Med Name: LISINOPRIL-HCTZ 10-12.5 MG TAB] 90 tablet 3     Sig: TAKE ONE TABLET BY MOUTH DAILY      Last office visit with prescribing clinician: 7/27/2021      Next office visit with prescribing clinician: 1/27/2022            Olesya Purvis LPN  10/27/21, 08:06 EDT

## 2021-11-01 ENCOUNTER — TELEPHONE (OUTPATIENT)
Dept: INTERNAL MEDICINE | Facility: CLINIC | Age: 46
End: 2021-11-01

## 2021-11-10 DIAGNOSIS — G89.29 CHRONIC MIDLINE LOW BACK PAIN WITHOUT SCIATICA: Primary | ICD-10-CM

## 2021-11-10 DIAGNOSIS — M54.50 CHRONIC MIDLINE LOW BACK PAIN WITHOUT SCIATICA: Primary | ICD-10-CM

## 2021-11-11 ENCOUNTER — APPOINTMENT (OUTPATIENT)
Dept: GENERAL RADIOLOGY | Facility: HOSPITAL | Age: 46
End: 2021-11-11

## 2021-11-16 ENCOUNTER — TELEPHONE (OUTPATIENT)
Dept: INTERNAL MEDICINE | Facility: CLINIC | Age: 46
End: 2021-11-16

## 2021-11-16 NOTE — TELEPHONE ENCOUNTER
Caller: Colin Cheema    Relationship: Self    Best call back number:[phone]     What orders are you requesting (i.e. lab or imaging): X- RAY    In what timeframe would the patient need to come in: TODAY    Where will you receive your lab/imaging services: [facility]     Additional notes: CAN BE PICKED UP OR FAXED [phone]  PREVIOUS ONE WAS SENT TO [facility], PATIENT DOESN'T WANT IT DONE AT [facility] IT WAS GOING TO COST TOO MUCH.

## 2021-11-16 NOTE — TELEPHONE ENCOUNTER
Dr. Lauren had put in original order last week for [facility]. Pt wants order changed to [facility]. Dr. Lauren would like Dr. Anna to handle this

## 2021-11-16 NOTE — TELEPHONE ENCOUNTER
PATIENT RETURNED CALL FROM RAMANDEEP.  SHE WAS NOT AVAILABLE BUT I GAVE PATIENT THE MESSAGE IN CHART THAT HE COULD GO TO [facility].  PATIENT VERBALIZED UNDERSTANDING.

## 2021-11-18 ENCOUNTER — TELEPHONE (OUTPATIENT)
Dept: INTERNAL MEDICINE | Facility: CLINIC | Age: 46
End: 2021-11-18

## 2021-11-18 DIAGNOSIS — G89.29 CHRONIC MIDLINE LOW BACK PAIN WITHOUT SCIATICA: ICD-10-CM

## 2021-11-18 DIAGNOSIS — M54.50 ACUTE RIGHT-SIDED LOW BACK PAIN WITHOUT SCIATICA: Primary | ICD-10-CM

## 2021-11-18 DIAGNOSIS — M54.50 CHRONIC MIDLINE LOW BACK PAIN WITHOUT SCIATICA: ICD-10-CM

## 2021-11-18 NOTE — TELEPHONE ENCOUNTER
PT CALLED BACK FOR THE XRAY RESULTS.   I EXPLAINED TO THE PT THAT WE HAVEN'T RECEIVED THEM YET.  PT STATED THAT [facility] HAD FAXED THEM OVER AT 9:09AM THIS MORNING.      I LET THE PT KNOW THAT WE WOULD CALL HIM WITH THE RESULTS ONCE THE DR HAD REVIEWED THEM.     PT VERBALIZED AND UNDERSTOOD THE INFORMATION.

## 2021-11-18 NOTE — TELEPHONE ENCOUNTER
Lumbar spine x-ray shows some degenerative changes and some narrowing of disc spaces.  Nothing unusual and nothing worrisome.  If he is improving with physical therapy, I would recommend continuing that.  Also do the stretches they give him every day at home.  Use moist heat to relax tight muscles.      if he is not improving, rec he see a chiropracter that we use all the time and trust ..I pended a referral to Dr. Trujillo chiropractor.  Let me know if he wants me to do that referral.  You can give him the phone number and let him make his own appointment and just fax the referral to Dr. Trujillo.

## 2021-11-19 NOTE — TELEPHONE ENCOUNTER
Called patient advised per note verbalized understanding but states he doesn't want to see chiropractor at this time

## 2022-02-08 ENCOUNTER — OFFICE VISIT (OUTPATIENT)
Dept: INTERNAL MEDICINE | Facility: CLINIC | Age: 47
End: 2022-02-08

## 2022-02-08 VITALS
HEART RATE: 83 BPM | RESPIRATION RATE: 16 BRPM | SYSTOLIC BLOOD PRESSURE: 126 MMHG | TEMPERATURE: 98.7 F | HEIGHT: 73 IN | WEIGHT: 221 LBS | BODY MASS INDEX: 29.29 KG/M2 | OXYGEN SATURATION: 97 % | DIASTOLIC BLOOD PRESSURE: 84 MMHG

## 2022-02-08 DIAGNOSIS — I10 BENIGN ESSENTIAL HYPERTENSION: Primary | ICD-10-CM

## 2022-02-08 DIAGNOSIS — E53.8 B12 DEFICIENCY: ICD-10-CM

## 2022-02-08 DIAGNOSIS — M54.9 BACK PAIN WITHOUT RADICULOPATHY: ICD-10-CM

## 2022-02-08 DIAGNOSIS — E66.3 OVERWEIGHT WITH BODY MASS INDEX (BMI) OF 29 TO 29.9 IN ADULT: Chronic | ICD-10-CM

## 2022-02-08 DIAGNOSIS — R25.2 LEG CRAMPS: ICD-10-CM

## 2022-02-08 DIAGNOSIS — K21.9 GASTROESOPHAGEAL REFLUX DISEASE WITHOUT ESOPHAGITIS: ICD-10-CM

## 2022-02-08 DIAGNOSIS — E78.2 MIXED HYPERLIPIDEMIA: ICD-10-CM

## 2022-02-08 DIAGNOSIS — R20.2 PARESTHESIA OF LEFT ARM AND LEG: ICD-10-CM

## 2022-02-08 DIAGNOSIS — R07.2 PRECORDIAL PAIN: ICD-10-CM

## 2022-02-08 PROCEDURE — 99214 OFFICE O/P EST MOD 30 MIN: CPT | Performed by: INTERNAL MEDICINE

## 2022-02-08 NOTE — PROGRESS NOTES
"[facility]     Colin Cheema  1975   [id number]      Patient Care Team:  Sherri Anna MD as PCP - Internal Medicine (Internal Medicine)    Chief Complaint   Patient presents with   • Hypertension     f/u   • Hyperlipidemia   • Numbness     LLE for last few months             HPI  Patient is a 46 y.o. male presents with L chest pain and tightness about 1.5 mo. Sometimes sharp and stabbing. Not exertional. Random.  Last up to 15 min maybe. No shortness of breath,diaphoresis,palpitations, edema.    HPI  Numbness L arm and leg come and go.  She also is not associated with the chest pain and tightness above.  More there than not.  Woke up this am with L arm and leg numb-    HPI  Leg cramps just on left leg. Not every night.     HPI  Dizziness and seeing stars sometimes when BP is higher.  Usually blood pressures controlled.    HPI  \"Had mild flu symptoms and fatigue\" but Had neg home Covid test about 2 wks ago.  Symptoms  resolved quickly.      CHRONIC CONDITIONS  Hasn't worked out since back pain started about 6 months ago. R low  Back pain improving with chiropracter-seeing Dr. Trujillo. Has also seen PT.     BPs 128/83 to 130/85 usually. HR usually about 60.  Taking hydrochlorothiazide/lisinopril daily.    Hyperlipidemia-trying to eat a low-fat healthy diet most of the time.  He is getting some exercise pretty regularly.    Past Medical History:   Diagnosis Date   • Achilles bursitis or tendinitis 2/6/2019   • Acute stress disorder 2/6/2019   • Allergic    • Allergic rhinitis    • GERD (gastroesophageal reflux disease)    • Hyperlipidemia    • Hypertension    • Low back pain    • Numbness and tingling in left arm 12/29/2015   • Piriformis syndrome, left 05/06/2016   • Plantar fasciitis 02/12/2016    left foot   • Vitamin B 12 deficiency        Past Surgical History:   Procedure Laterality Date   • HERNIA REPAIR Bilateral 2001    Inguinal       Family History   Problem Relation Age of " "Onset   • Testicular cancer Father    • Colon cancer Other        Social History     Socioeconomic History   • Marital status:    Tobacco Use   • Smoking status: Never Smoker   • Smokeless tobacco: Never Used   Substance and Sexual Activity   • Alcohol use: Yes     Comment: socially       Allergies   Allergen Reactions   • Loratadine Unknown (See Comments)     Edgy feeling per pt        Review of Systems:     Review of Systems    Vital Signs  Vitals:    02/08/22 1423   BP: 126/84   BP Location: Left arm   Patient Position: Sitting   Cuff Size: Adult   Pulse: 83   Resp: 16   Temp: 98.7 °F (37.1 °C)   TempSrc: Infrared   SpO2: 97%   Weight: 100 kg (221 lb)   Height: 185.4 cm (73\")   PainSc: 0-No pain     Body mass index is 29.16 kg/m².  Patient's Body mass index is 29.16 kg/m². indicating that he is overweight (BMI 25-29.9). Patient's (Body mass index is 29.16 kg/m².) indicates that they are overweight with health conditions that include hypertension and dyslipidemias . Weight is unchanged. BMI is is above average; BMI management plan is completed. We discussed low calorie, low carb based diet program, portion control and increasing exercise. .        Current Outpatient Medications:   •  finasteride (PROPECIA) 1 MG tablet, TAKE ONE TABLET BY MOUTH DAILY (Patient taking differently: Take 1 mg by mouth As Needed.), Disp: 30 tablet, Rfl: 0  •  ibuprofen (ADVIL,MOTRIN) 600 MG tablet, Take 1 tablet by mouth Every 6 (Six) Hours As Needed for Mild Pain ., Disp: 30 tablet, Rfl: 0  •  lisinopril-hydrochlorothiazide (PRINZIDE,ZESTORETIC) 10-12.5 MG per tablet, TAKE ONE TABLET BY MOUTH DAILY, Disp: 90 tablet, Rfl: 3  •  famotidine (PEPCID) 40 MG tablet, Take 1 tablet by mouth Daily., Disp: 90 tablet, Rfl: 3  •  hydrocortisone 2.5 % cream, , Disp: , Rfl:     Physical Exam:    Physical Exam  Vitals and nursing note reviewed.   Constitutional:       Appearance: He is well-developed and overweight.   HENT:      Head: " Normocephalic.   Eyes:      Conjunctiva/sclera: Conjunctivae normal.      Pupils: Pupils are equal, round, and reactive to light.   Neck:      Thyroid: No thyromegaly.   Cardiovascular:      Rate and Rhythm: Normal rate and regular rhythm.      Pulses: Normal pulses.      Heart sounds: Normal heart sounds.   Pulmonary:      Effort: Pulmonary effort is normal.      Breath sounds: Normal breath sounds. No wheezing.   Musculoskeletal:         General: Normal range of motion.      Cervical back: Normal range of motion and neck supple.      Right lower leg: No edema.      Left lower leg: No edema.   Lymphadenopathy:      Cervical: No cervical adenopathy.   Skin:     General: Skin is warm and dry.   Neurological:      Mental Status: He is alert and oriented to person, place, and time.      Sensory: Sensation is intact.      Motor: Motor function is intact.      Gait: Gait is intact.   Psychiatric:         Attention and Perception: Attention normal.         Mood and Affect: Mood normal.         Thought Content: Thought content normal.         ECG 12 Lead    Date/Time: 2/11/2022 6:17 PM  Performed by: Sherri Anna MD  Authorized by: Sherri Anna MD   Comparison: compared with previous ECG   Similar to previous ECG  Rhythm: sinus rhythm  Rate: normal  BPM: 60  Conduction: conduction normal  ST Segments: ST segments normal  T Waves: T waves normal  QRS axis: normal    Clinical impression: normal ECG             ACE III MINI        Results Review:    None    CMP:  Lab Results   Component Value Date    BUN 19 07/27/2021    CREATININE 0.99 07/27/2021    EGFRIFNONA 81 07/27/2021    EGFRIFAFRI 99 07/27/2021    BCR 19.2 07/27/2021     07/27/2021    K 4.2 07/27/2021    CO2 26.9 07/27/2021    CALCIUM 9.6 07/27/2021    PROTENTOTREF 7.1 07/27/2021    ALBUMIN 4.70 07/27/2021    LABGLOBREF 2.4 07/27/2021    LABIL2 2.0 07/27/2021    BILITOT 0.7 07/27/2021    ALKPHOS 44 07/27/2021    AST 22 07/27/2021    ALT 27 07/27/2021      HbA1c:  No results found for: HGBA1C  Microalbumin:  Lab Results   Component Value Date    MICROALBUR 13.1 08/04/2020     Lipid Panel  Lab Results   Component Value Date    CHOL 174 02/07/2019    TRIG 156 (H) 07/27/2021    HDL 41 07/27/2021     (H) 07/27/2021    AST 22 07/27/2021    ALT 27 07/27/2021       Medication Review: Medications reviewed and noted  Patient Instructions   Problem List Items Addressed This Visit        Cardiac and Vasculature    Mixed hyperlipidemia    Overview     Continue to improve low-fat low sugar diet.  Resume regular exercise.         Benign essential hypertension - Primary    Overview     Continue lisinopril/hydrochlorothiazide daily.     Continue to avoid salt in the diet and avoid sodas. Resume exercise.          Relevant Medications    lisinopril-hydrochlorothiazide (PRINZIDE,ZESTORETIC) 10-12.5 MG per tablet    Precordial pain    Overview     Precordial pain which is intermittent, sometimes sharp and stabbing, not exertional, random, without associated symptoms of shortness of breath or diaphoresis or palpitations or edema or nausea.  Most likely not cardiac in nature.  EKG today is normal sinus rhythm.  Possibly stress related.  Possibly GI in nature.         Relevant Orders    CBC & Differential    Comprehensive Metabolic Panel    Homocysteine    High Sensitivity CRP    ECG 12 Lead       Endocrine and Metabolic    B12 deficiency    Overview     Recheck labs         Relevant Orders    Vitamin B12       Gastrointestinal Abdominal     Gastroesophageal reflux disease without esophagitis    Overview     EGD by Dr. Lopez 2/20/2021 showing diffuse esophagitis.    Patient was reluctant to take pantoprazole daily, so we decided on famotidine 40 mg tablet daily to decrease acid production.    He will continue famotidine daily.  Use a wedge pillow to elevate the head at night.  Avoid eating close to bedtime.  Avoid large meals.  Avoid alcohol after dinner.  Avoid food  triggers.         Relevant Medications    famotidine (PEPCID) 40 MG tablet       Musculoskeletal and Injuries    Back pain without radiculopathy    Overview     Continue follow-up with Dr. Trujillo.  Continue some back stretches and exercises every day.  Use moist heat to relax tight muscles as needed.            Symptoms and Signs    Paresthesia of left arm and leg    Overview     Check labs including B12 level.         Relevant Orders    TSH    Vitamin B6    Vitamin D 25 Hydroxy    Leg cramps    Overview     Patient advised to drink plenty of fluids every day.  Do some stretching or walking every day.         Relevant Orders    Magnesium       Other    Overweight with body mass index (BMI) of 29 to 29.9 in adult (Chronic)    Overview     Continue exercise and walking.  Continue to improve low fat low sugar diet and eat smaller portions at mealtime.    Try to lose about 10 lb over the next 6 months. Weigh every Monday morning to monitor progress.                      Diagnosis Plan   1. Benign essential hypertension     2. Precordial pain  CBC & Differential    Comprehensive Metabolic Panel    Homocysteine    High Sensitivity CRP    ECG 12 Lead   3. Paresthesia of left arm and leg  TSH    Vitamin B6    Vitamin D 25 Hydroxy   4. Leg cramps  Magnesium   5. Mixed hyperlipidemia     6. Back pain without radiculopathy     7. B12 deficiency  Vitamin B12   8. Overweight with body mass index (BMI) of 29 to 29.9 in adult     9. Gastroesophageal reflux disease without esophagitis             Plan of care reviewed with patient at the conclusion of today's visit. Education was provided regarding diagnosis, management, and any prescribed or recommended OTC medications.Patient verbalizes understanding of and agreement with management plan.         Sherri Anna MD

## 2022-02-11 PROBLEM — R25.2 LEG CRAMPS: Status: ACTIVE | Noted: 2022-02-11

## 2022-02-11 PROBLEM — R20.2 PARESTHESIA OF LEFT ARM AND LEG: Status: ACTIVE | Noted: 2022-02-11

## 2022-02-11 PROBLEM — R07.2 PRECORDIAL PAIN: Status: ACTIVE | Noted: 2022-02-11

## 2022-02-11 PROCEDURE — 93000 ELECTROCARDIOGRAM COMPLETE: CPT | Performed by: INTERNAL MEDICINE

## 2022-02-11 NOTE — PATIENT INSTRUCTIONS
Patient Instructions   Problem List Items Addressed This Visit        Cardiac and Vasculature    Mixed hyperlipidemia    Overview     Continue to improve low-fat low sugar diet.  Resume regular exercise.         Benign essential hypertension - Primary    Overview     Continue lisinopril/hydrochlorothiazide daily.     Continue to avoid salt in the diet and avoid sodas. Resume exercise.          Relevant Medications    lisinopril-hydrochlorothiazide (PRINZIDE,ZESTORETIC) 10-12.5 MG per tablet    Precordial pain    Overview     Precordial pain which is intermittent, sometimes sharp and stabbing, not exertional, random, without associated symptoms of shortness of breath or diaphoresis or palpitations or edema or nausea.  Most likely not cardiac in nature.  EKG today is normal sinus rhythm.  Possibly stress related.  Possibly GI in nature.         Relevant Orders    CBC & Differential    Comprehensive Metabolic Panel    Homocysteine    High Sensitivity CRP    ECG 12 Lead       Endocrine and Metabolic    B12 deficiency    Overview     Recheck labs         Relevant Orders    Vitamin B12       Gastrointestinal Abdominal     Gastroesophageal reflux disease without esophagitis    Overview     EGD by Dr. Lopez 2/20/2021 showing diffuse esophagitis.    Patient was reluctant to take pantoprazole daily, so we decided on famotidine 40 mg tablet daily to decrease acid production.    He will continue famotidine daily.  Use a wedge pillow to elevate the head at night.  Avoid eating close to bedtime.  Avoid large meals.  Avoid alcohol after dinner.  Avoid food triggers.         Relevant Medications    famotidine (PEPCID) 40 MG tablet       Musculoskeletal and Injuries    Back pain without radiculopathy    Overview     Continue follow-up with Dr. Trujillo.  Continue some back stretches and exercises every day.  Use moist heat to relax tight muscles as needed.            Symptoms and Signs    Paresthesia of left arm and leg    Overview      Check labs including B12 level.         Relevant Orders    TSH    Vitamin B6    Vitamin D 25 Hydroxy    Leg cramps    Overview     Patient advised to drink plenty of fluids every day.  Do some stretching or walking every day.         Relevant Orders    Magnesium       Other    Overweight with body mass index (BMI) of 29 to 29.9 in adult (Chronic)    Overview     Continue exercise and walking.  Continue to improve low fat low sugar diet and eat smaller portions at mealtime.    Try to lose about 10 lb over the next 6 months. Weigh every Monday morning to monitor progress.                      BMI for Adults  What is BMI?  Body mass index (BMI) is a number that is calculated from a person's weight and height. BMI can help estimate how much of a person's weight is composed of fat. BMI does not measure body fat directly. Rather, it is an alternative to procedures that directly measure body fat, which can be difficult and expensive.  BMI can help identify people who may be at higher risk for certain medical problems.  What are BMI measurements used for?  BMI is used as a screening tool to identify possible weight problems. It helps determine whether a person is obese, overweight, a healthy weight, or underweight.  BMI is useful for:  · Identifying a weight problem that may be related to a medical condition or may increase the risk for medical problems.  · Promoting changes, such as changes in diet and exercise, to help reach a healthy weight. BMI screening can be repeated to see if these changes are working.  How is BMI calculated?  BMI involves measuring your weight in relation to your height. Both height and weight are measured, and the BMI is calculated from those numbers. This can be done either in English ([place] or metric measurements. Note that charts and online BMI calculators are available to help you find your BMI quickly and easily without having to do these calculations yourself.  To calculate your BMI in  "English ([place] measurements:    1. Measure your weight in pounds (lb).  2. Multiply the number of pounds by 703.  ? For example, for a person who weighs 180 lb, multiply that number by 703, which equals 126,540.  3. Measure your height in inches. Then multiply that number by itself to get a measurement called \"inches squared.\"  ? For example, for a person who is 70 inches tall, the \"inches squared\" measurement is 70 inches x 70 inches, which equals 4,900 inches squared.  4. Divide the total from step 2 (number of lb x 703) by the total from step 3 (inches squared): 126,540 ÷ 4,900 = 25.8. This is your BMI.    To calculate your BMI in metric measurements:  1. Measure your weight in kilograms (kg).  2. Measure your height in meters (m). Then multiply that number by itself to get a measurement called \"meters squared.\"  ? For example, for a person who is 1.75 m tall, the \"meters squared\" measurement is 1.75 m x 1.75 m, which is equal to 3.1 meters squared.  3. Divide the number of kilograms (your weight) by the meters squared number. In this example: 70 ÷ 3.1 = 22.6. This is your BMI.  What do the results mean?  BMI charts are used to identify whether you are underweight, normal weight, overweight, or obese. The following guidelines will be used:  · Underweight: BMI less than 18.5.  · Normal weight: BMI between 18.5 and 24.9.  · Overweight: BMI between 25 and 29.9.  · Obese: BMI of 30 or above.  Keep these notes in mind:  · Weight includes both fat and muscle, so someone with a muscular build, such as an athlete, may have a BMI that is higher than 24.9. In cases like these, BMI is not an accurate measure of body fat.  · To determine if excess body fat is the cause of a BMI of 25 or higher, further assessments may need to be done by a health care provider.  · BMI is usually interpreted in the same way for men and women.  Where to find more information  For more information about BMI, including tools to quickly calculate " your BMI, go to these websites:  · Centers for Disease Control and Prevention: www.cdc.gov  · American Heart Association: www.heart.org  · National Heart, Lung, and Blood [place]: www.nhlbi.nih.gov  Summary  · Body mass index (BMI) is a number that is calculated from a person's weight and height.  · BMI may help estimate how much of a person's weight is composed of fat. BMI can help identify those who may be at higher risk for certain medical problems.  · BMI can be measured using English measurements or metric measurements.  · BMI charts are used to identify whether you are underweight, normal weight, overweight, or obese.  This information is not intended to replace advice given to you by your health care provider. Make sure you discuss any questions you have with your health care provider.  Document Revised: 09/09/2020 Document Reviewed: 07/17/2020  [employer] Patient Education © 2021 [employer]      Calorie Counting for Weight Loss  Calories are units of energy. Your body needs a certain number of calories from food to keep going throughout the day. When you eat or drink more calories than your body needs, your body stores the extra calories mostly as fat. When you eat or drink fewer calories than your body needs, your body burns fat to get the energy it needs.  Calorie counting means keeping track of how many calories you eat and drink each day. Calorie counting can be helpful if you need to lose weight. If you eat fewer calories than your body needs, you should lose weight. Ask your health care provider what a healthy weight is for you.  For calorie counting to work, you will need to eat the right number of calories each day to lose a healthy amount of weight per week. A dietitian can help you figure out how many calories you need in a day and will suggest ways to reach your calorie goal.  · A healthy amount of weight to lose each week is usually 1-2 lb (0.5-0.9 kg). This usually means that your daily  calorie intake should be reduced by 500-750 calories.  · Eating 1,200-1,500 calories a day can help most women lose weight.  · Eating 1,500-1,800 calories a day can help most men lose weight.  What do I need to know about calorie counting?  Work with your health care provider or dietitian to determine how many calories you should get each day. To meet your daily calorie goal, you will need to:  · Find out how many calories are in each food that you would like to eat. Try to do this before you eat.  · Decide how much of the food you plan to eat.  · Keep a food log. Do this by writing down what you ate and how many calories it had.  To successfully lose weight, it is important to balance calorie counting with a healthy lifestyle that includes regular activity.  Where do I find calorie information?    The number of calories in a food can be found on a Nutrition Facts label. If a food does not have a Nutrition Facts label, try to look up the calories online or ask your dietitian for help.  Remember that calories are listed per serving. If you choose to have more than one serving of a food, you will have to multiply the calories per serving by the number of servings you plan to eat. For example, the label on a package of bread might say that a serving size is 1 slice and that there are 90 calories in a serving. If you eat 1 slice, you will have eaten 90 calories. If you eat 2 slices, you will have eaten 180 calories.  How do I keep a food log?  After each time that you eat, record the following in your food log as soon as possible:  · What you ate. Be sure to include toppings, sauces, and other extras on the food.  · How much you ate. This can be measured in cups, ounces, or number of items.  · How many calories were in each food and drink.  · The total number of calories in the food you ate.  Keep your food log near you, such as in a pocket-sized notebook or on an linda or website on your mobile phone. Some programs will  calculate calories for you and show you how many calories you have left to meet your daily goal.  What are some portion-control tips?  · Know how many calories are in a serving. This will help you know how many servings you can have of a certain food.  · Use a measuring cup to measure serving sizes. You could also try weighing out portions on a kitchen scale. With time, you will be able to estimate serving sizes for some foods.  · Take time to put servings of different foods on your favorite plates or in your favorite bowls and cups so you know what a serving looks like.  · Try not to eat straight from a food's packaging, such as from a bag or box. Eating straight from the package makes it hard to see how much you are eating and can lead to overeating. Put the amount you would like to eat in a cup or on a plate to make sure you are eating the right portion.  · Use smaller plates, glasses, and bowls for smaller portions and to prevent overeating.  · Try not to multitask. For example, avoid watching TV or using your computer while eating. If it is time to eat, sit down at a table and enjoy your food. This will help you recognize when you are full. It will also help you be more mindful of what and how much you are eating.  What are tips for following this plan?  Reading food labels  · Check the calorie count compared with the serving size. The serving size may be smaller than what you are used to eating.  · Check the source of the calories. Try to choose foods that are high in protein, fiber, and vitamins, and low in saturated fat, trans fat, and sodium.  Shopping  · Read nutrition labels while you shop. This will help you make healthy decisions about which foods to buy.  · Pay attention to nutrition labels for low-fat or fat-free foods. These foods sometimes have the same number of calories or more calories than the full-fat versions. They also often have added sugar, starch, or salt to make up for flavor that was  removed with the fat.  · Make a grocery list of lower-calorie foods and stick to it.  Cooking  · Try to cook your favorite foods in a healthier way. For example, try baking instead of frying.  · Use low-fat dairy products.  Meal planning  · Use more fruits and vegetables. One-half of your plate should be fruits and vegetables.  · Include lean proteins, such as chicken, turkey, and fish.  Lifestyle  Each week, aim to do one of the following:  · 150 minutes of moderate exercise, such as walking.  · 75 minutes of vigorous exercise, such as running.  General information  · Know how many calories are in the foods you eat most often. This will help you calculate calorie counts faster.  · Find a way of tracking calories that works for you. Get creative. Try different apps or programs if writing down calories does not work for you.  What foods should I eat?    · Eat nutritious foods. It is better to have a nutritious, high-calorie food, such as an avocado, than a food with few nutrients, such as a bag of potato chips.  · Use your calories on foods and drinks that will fill you up and will not leave you hungry soon after eating.  ? Examples of foods that fill you up are nuts and nut butters, vegetables, lean proteins, and high-fiber foods such as whole grains. High-fiber foods are foods with more than 5 g of fiber per serving.  · Pay attention to calories in drinks. Low-calorie drinks include water and unsweetened drinks.  The items listed above may not be a complete list of foods and beverages you can eat. Contact a dietitian for more information.  What foods should I limit?  Limit foods or drinks that are not good sources of vitamins, minerals, or protein or that are high in unhealthy fats. These include:  · Candy.  · Other sweets.  · Sodas, specialty coffee drinks, alcohol, and juice.  The items listed above may not be a complete list of foods and beverages you should avoid. Contact a dietitian for more information.  How  do I count calories when eating out?  · Pay attention to portions. Often, portions are much larger when eating out. Try these tips to keep portions smaller:  ? Consider sharing a meal instead of getting your own.  ? If you get your own meal, eat only half of it. Before you start eating, ask for a container and put half of your meal into it.  ? When available, consider ordering smaller portions from the menu instead of full portions.  · Pay attention to your food and drink choices. Knowing the way food is cooked and what is included with the meal can help you eat fewer calories.  ? If calories are listed on the menu, choose the lower-calorie options.  ? Choose dishes that include vegetables, fruits, whole grains, low-fat dairy products, and lean proteins.  ? Choose items that are boiled, broiled, grilled, or steamed. Avoid items that are buttered, battered, fried, or served with cream sauce. Items labeled as crispy are usually fried, unless stated otherwise.  ? Choose water, low-fat milk, unsweetened iced tea, or other drinks without added sugar. If you want an alcoholic beverage, choose a lower-calorie option, such as a glass of wine or light beer.  ? Ask for dressings, sauces, and syrups on the side. These are usually high in calories, so you should limit the amount you eat.  ? If you want a salad, choose a garden salad and ask for grilled meats. Avoid extra toppings such as khanna, cheese, or fried items. Ask for the dressing on the side, or ask for olive oil and vinegar or lemon to use as dressing.  · Estimate how many servings of a food you are given. Knowing serving sizes will help you be aware of how much food you are eating at restaurants.  Where to find more information  · Centers for Disease Control and Prevention: www.cdc.gov  · [place]. Department of Agriculture: myplate.gov  Summary  · Calorie counting means keeping track of how many calories you eat and drink each day. If you eat fewer calories than your  body needs, you should lose weight.  · A healthy amount of weight to lose per week is usually 1-2 lb (0.5-0.9 kg). This usually means reducing your daily calorie intake by 500-750 calories.  · The number of calories in a food can be found on a Nutrition Facts label. If a food does not have a Nutrition Facts label, try to look up the calories online or ask your dietitian for help.  · Use smaller plates, glasses, and bowls for smaller portions and to prevent overeating.  · Use your calories on foods and drinks that will fill you up and not leave you hungry shortly after a meal.  This information is not intended to replace advice given to you by your health care provider. Make sure you discuss any questions you have with your health care provider.  Document Revised: 01/28/2021 Document Reviewed: 01/28/2021  [employer] Patient Education © 2021 [employer]      Exercising to Stay Healthy  To become healthy and stay healthy, it is recommended that you do moderate-intensity and vigorous-intensity exercise. You can tell that you are exercising at a moderate intensity if your heart starts beating faster and you start breathing faster but can still hold a conversation. You can tell that you are exercising at a vigorous intensity if you are breathing much harder and faster and cannot hold a conversation while exercising.  Exercising regularly is important. It has many health benefits, such as:  · Improving overall fitness, flexibility, and endurance.  · Increasing bone density.  · Helping with weight control.  · Decreasing body fat.  · Increasing muscle strength.  · Reducing stress and tension.  · Improving overall health.  How often should I exercise?  Choose an activity that you enjoy, and set realistic goals. Your health care provider can help you make an activity plan that works for you.  Exercise regularly as told by your health care provider. This may include:  · Doing strength training two times a week, such  as:  ? Lifting weights.  ? Using resistance bands.  ? Push-ups.  ? Sit-ups.  ? Yoga.  · Doing a certain intensity of exercise for a given amount of time. Choose from these options:  ? A total of 150 minutes of moderate-intensity exercise every week.  ? A total of 75 minutes of vigorous-intensity exercise every week.  ? A mix of moderate-intensity and vigorous-intensity exercise every week.  Children, pregnant women, people who have not exercised regularly, people who are overweight, and older adults may need to talk with a health care provider about what activities are safe to do. If you have a medical condition, be sure to talk with your health care provider before you start a new exercise program.  What are some exercise ideas?  Moderate-intensity exercise ideas include:  · Walking 1 mile (1.6 km) in about 15 minutes.  · Biking.  · Hiking.  · Golfing.  · Dancing.  · Water aerobics.  Vigorous-intensity exercise ideas include:  · Walking 4.5 miles (7.2 km) or more in about 1 hour.  · Jogging or running 5 miles (8 km) in about 1 hour.  · Biking 10 miles (16.1 km) or more in about 1 hour.  · Lap swimming.  · Roller-skating or in-line skating.  · Cross-country skiing.  · Vigorous competitive sports, such as football, basketball, and soccer.  · Jumping rope.  · Aerobic dancing.  What are some everyday activities that can help me to get exercise?  · Yard work, such as:  ? Pushing a .  ? Raking and bagging leaves.  · Washing your car.  · Pushing a stroller.  · Shoveling snow.  · Gardening.  · Washing windows or floors.  How can I be more active in my day-to-day activities?  · Use stairs instead of an elevator.  · Take a walk during your lunch break.  · If you drive, park your car farther away from your work or school.  · If you take public transportation, get off one stop early and walk the rest of the way.  · Stand up or walk around during all of your indoor phone calls.  · Get up, stretch, and walk around  every 30 minutes throughout the day.  · Enjoy exercise with a friend. Support to continue exercising will help you keep a regular routine of activity.  What guidelines can I follow while exercising?  · Before you start a new exercise program, talk with your health care provider.  · Do not exercise so much that you hurt yourself, feel dizzy, or get very short of breath.  · Wear comfortable clothes and wear shoes with good support.  · Drink plenty of water while you exercise to prevent dehydration or heat stroke.  · Work out until your breathing and your heartbeat get faster.  Where to find more information  · [place] Department of Health and Human Services: www.hhs.gov  · Centers for Disease Control and Prevention (CDC): www.cdc.gov  Summary  · Exercising regularly is important. It will improve your overall fitness, flexibility, and endurance.  · Regular exercise also will improve your overall health. It can help you control your weight, reduce stress, and improve your bone density.  · Do not exercise so much that you hurt yourself, feel dizzy, or get very short of breath.  · Before you start a new exercise program, talk with your health care provider.  This information is not intended to replace advice given to you by your health care provider. Make sure you discuss any questions you have with your health care provider.  Document Revised: 11/30/2018 Document Reviewed: 11/08/2018  [employer] Patient Education © 2021 [employer]

## 2022-02-14 NOTE — PROGRESS NOTES
Ok for hub to read    Left voicemail for patient to call office to schedule physical with Dr. Anna in about 5 months approximately in July.

## 2022-08-11 ENCOUNTER — TELEPHONE (OUTPATIENT)
Dept: INTERNAL MEDICINE | Facility: CLINIC | Age: 47
End: 2022-08-11

## 2022-08-11 RX ORDER — SULFACETAMIDE SODIUM 100 MG/ML
1 SOLUTION/ DROPS OPHTHALMIC
Qty: 15 ML | Refills: 0 | Status: SHIPPED | OUTPATIENT
Start: 2022-08-11 | End: 2022-11-18

## 2022-08-11 NOTE — TELEPHONE ENCOUNTER
Caller: Colin Cheema    Relationship: Self    Best call back number:     [phone]     What medication are you requesting:     PATIENT REQUESTED A MEDICATION FOR PINK EYE SYMPTOMS    What are your current symptoms:     CRUSTY/SWOLLEN EYE LID  RED/IRRITATED EYES  ITCHY    How long have you been experiencing symptoms:     SINCE YESTERDAY, 8/10/22    Have you had these symptoms before:    [x] Yes  [] No    Have you been treated for these symptoms before:   [x] Yes  [] No    If a prescription is needed, what is your preferred pharmacy and phone number:      [place], [place]    TELEPHONE CONTACT:    [phone]    DR YOUNG

## 2022-08-11 NOTE — TELEPHONE ENCOUNTER
The best treatment is doing warm compresses.  May also use just a saline or rewetting eyedrop.  It is very very contagious so he has to be very careful about washing his hands and not spreading it

## 2022-08-11 NOTE — TELEPHONE ENCOUNTER
I sent Sulamyd drops to the pharmacy. Continue warm compresses several times a day. If not improving with that, he will need to be seen by ophthalmologist

## 2022-08-11 NOTE — TELEPHONE ENCOUNTER
Patient state he has tried all of the home remedies and they are not working asking for an antibiotic to get rid of pink eye. Advised patient he may have to get evaluated and schedule an appointment patient states he does not have any time to schedule any appointments.

## 2022-08-11 NOTE — TELEPHONE ENCOUNTER
LVM for patient to return call.     HUB TO READ: I sent Sulamyd drops to the pharmacy. Continue warm compresses several times a day. If not improving with that, he will need to be seen by ophthalmologist

## 2022-09-16 RX ORDER — FINASTERIDE 1 MG/1
TABLET, FILM COATED ORAL
Qty: 30 TABLET | Refills: 0 | Status: SHIPPED | OUTPATIENT
Start: 2022-09-16

## 2022-09-16 NOTE — TELEPHONE ENCOUNTER
Rx Refill Note  Requested Prescriptions     Pending Prescriptions Disp Refills   • finasteride (PROPECIA) 1 MG tablet [Pharmacy Med Name: FINASTERIDE 1 MG TABLET] 30 tablet 0     Sig: TAKE ONE TABLET BY MOUTH DAILY      Last office visit with prescribing clinician: 2/8/2022      Next office visit with prescribing clinician: Visit date not found            Olesya Purvis LPN  09/16/22, 08:52 EDT

## 2022-11-11 ENCOUNTER — TELEPHONE (OUTPATIENT)
Dept: INTERNAL MEDICINE | Facility: CLINIC | Age: 47
End: 2022-11-11

## 2022-11-11 NOTE — TELEPHONE ENCOUNTER
Caller: Colin Cheema    Relationship: Self    Best call back number: [phone]    What was the call regarding: PATIENT IS REQUESTING BLOOD ORDERS BEFORE HIS APPOINMENT 11/18.     Do you require a callback: YES PLEASE.

## 2022-11-14 NOTE — TELEPHONE ENCOUNTER
PATIENT CAME IN OFFICE 11/14 BECAUSE REQUESTED LABS HAD NOT BEEN ORDERED. HE SAID HE DIDN'T HAVE TIME TO DO THEM NOW UNTIL TOMORROW. PLEASE HAVE ORDERS PUT IN.

## 2022-11-15 ENCOUNTER — LAB (OUTPATIENT)
Dept: LAB | Facility: HOSPITAL | Age: 47
End: 2022-11-15

## 2022-11-15 DIAGNOSIS — E53.8 B12 DEFICIENCY: ICD-10-CM

## 2022-11-15 DIAGNOSIS — E78.2 MIXED HYPERLIPIDEMIA: ICD-10-CM

## 2022-11-15 DIAGNOSIS — Z12.5 PROSTATE CANCER SCREENING: ICD-10-CM

## 2022-11-15 DIAGNOSIS — I10 BENIGN ESSENTIAL HYPERTENSION: ICD-10-CM

## 2022-11-15 LAB
25(OH)D3 SERPL-MCNC: 48.7 NG/ML (ref 30–100)
ALBUMIN SERPL-MCNC: 4.6 G/DL (ref 3.5–5.2)
ALBUMIN/GLOB SERPL: 1.8 G/DL
ALP SERPL-CCNC: 44 U/L (ref 39–117)
ALT SERPL W P-5'-P-CCNC: 28 U/L (ref 1–41)
ANION GAP SERPL CALCULATED.3IONS-SCNC: 8.9 MMOL/L (ref 5–15)
AST SERPL-CCNC: 22 U/L (ref 1–40)
BASOPHILS # BLD AUTO: 0.04 10*3/MM3 (ref 0–0.2)
BASOPHILS NFR BLD AUTO: 0.7 % (ref 0–1.5)
BILIRUB SERPL-MCNC: 0.7 MG/DL (ref 0–1.2)
BUN SERPL-MCNC: 18 MG/DL (ref 6–20)
BUN/CREAT SERPL: 17.1 (ref 7–25)
CALCIUM SPEC-SCNC: 9.4 MG/DL (ref 8.6–10.5)
CHLORIDE SERPL-SCNC: 99 MMOL/L (ref 98–107)
CHOLEST SERPL-MCNC: 160 MG/DL (ref 0–200)
CO2 SERPL-SCNC: 28.1 MMOL/L (ref 22–29)
CREAT SERPL-MCNC: 1.05 MG/DL (ref 0.76–1.27)
DEPRECATED RDW RBC AUTO: 37.6 FL (ref 37–54)
EGFRCR SERPLBLD CKD-EPI 2021: 88.1 ML/MIN/1.73
EOSINOPHIL # BLD AUTO: 0.21 10*3/MM3 (ref 0–0.4)
EOSINOPHIL NFR BLD AUTO: 3.7 % (ref 0.3–6.2)
ERYTHROCYTE [DISTWIDTH] IN BLOOD BY AUTOMATED COUNT: 12.4 % (ref 12.3–15.4)
GLOBULIN UR ELPH-MCNC: 2.5 GM/DL
GLUCOSE SERPL-MCNC: 93 MG/DL (ref 65–99)
HCT VFR BLD AUTO: 45.4 % (ref 37.5–51)
HDLC SERPL-MCNC: 39 MG/DL (ref 40–60)
HGB BLD-MCNC: 15.7 G/DL (ref 13–17.7)
IMM GRANULOCYTES # BLD AUTO: 0.01 10*3/MM3 (ref 0–0.05)
IMM GRANULOCYTES NFR BLD AUTO: 0.2 % (ref 0–0.5)
LDLC SERPL CALC-MCNC: 97 MG/DL (ref 0–100)
LDLC/HDLC SERPL: 2.4 {RATIO}
LYMPHOCYTES # BLD AUTO: 1.2 10*3/MM3 (ref 0.7–3.1)
LYMPHOCYTES NFR BLD AUTO: 21 % (ref 19.6–45.3)
MCH RBC QN AUTO: 29 PG (ref 26.6–33)
MCHC RBC AUTO-ENTMCNC: 34.6 G/DL (ref 31.5–35.7)
MCV RBC AUTO: 83.8 FL (ref 79–97)
MONOCYTES # BLD AUTO: 0.44 10*3/MM3 (ref 0.1–0.9)
MONOCYTES NFR BLD AUTO: 7.7 % (ref 5–12)
NEUTROPHILS NFR BLD AUTO: 3.81 10*3/MM3 (ref 1.7–7)
NEUTROPHILS NFR BLD AUTO: 66.7 % (ref 42.7–76)
NRBC BLD AUTO-RTO: 0 /100 WBC (ref 0–0.2)
PLATELET # BLD AUTO: 274 10*3/MM3 (ref 140–450)
PMV BLD AUTO: 10 FL (ref 6–12)
POTASSIUM SERPL-SCNC: 4.2 MMOL/L (ref 3.5–5.2)
PROT SERPL-MCNC: 7.1 G/DL (ref 6–8.5)
PSA SERPL-MCNC: 0.56 NG/ML (ref 0–4)
RBC # BLD AUTO: 5.42 10*6/MM3 (ref 4.14–5.8)
SODIUM SERPL-SCNC: 136 MMOL/L (ref 136–145)
TRIGL SERPL-MCNC: 137 MG/DL (ref 0–150)
TSH SERPL DL<=0.05 MIU/L-ACNC: 1.31 UIU/ML (ref 0.27–4.2)
VIT B12 BLD-MCNC: 282 PG/ML (ref 211–946)
VLDLC SERPL-MCNC: 24 MG/DL (ref 5–40)
WBC NRBC COR # BLD: 5.71 10*3/MM3 (ref 3.4–10.8)

## 2022-11-15 PROCEDURE — G0103 PSA SCREENING: HCPCS

## 2022-11-15 PROCEDURE — 80050 GENERAL HEALTH PANEL: CPT

## 2022-11-15 PROCEDURE — 80061 LIPID PANEL: CPT

## 2022-11-15 PROCEDURE — 82306 VITAMIN D 25 HYDROXY: CPT

## 2022-11-15 PROCEDURE — 82607 VITAMIN B-12: CPT

## 2022-11-18 ENCOUNTER — OFFICE VISIT (OUTPATIENT)
Dept: INTERNAL MEDICINE | Facility: CLINIC | Age: 47
End: 2022-11-18

## 2022-11-18 VITALS
HEIGHT: 73 IN | SYSTOLIC BLOOD PRESSURE: 128 MMHG | TEMPERATURE: 97.5 F | WEIGHT: 223.6 LBS | DIASTOLIC BLOOD PRESSURE: 82 MMHG | HEART RATE: 62 BPM | OXYGEN SATURATION: 99 % | BODY MASS INDEX: 29.63 KG/M2

## 2022-11-18 DIAGNOSIS — Z12.5 PROSTATE CANCER SCREENING: ICD-10-CM

## 2022-11-18 DIAGNOSIS — E78.2 MIXED HYPERLIPIDEMIA: ICD-10-CM

## 2022-11-18 DIAGNOSIS — E66.3 OVERWEIGHT WITH BODY MASS INDEX (BMI) OF 29 TO 29.9 IN ADULT: Chronic | ICD-10-CM

## 2022-11-18 DIAGNOSIS — Z00.00 ANNUAL PHYSICAL EXAM: Primary | ICD-10-CM

## 2022-11-18 DIAGNOSIS — G44.51 HEMICRANIA CONTINUA: Chronic | ICD-10-CM

## 2022-11-18 DIAGNOSIS — U07.1 COVID-19 VIRUS INFECTION: Chronic | ICD-10-CM

## 2022-11-18 DIAGNOSIS — E53.8 B12 DEFICIENCY: ICD-10-CM

## 2022-11-18 DIAGNOSIS — I10 BENIGN ESSENTIAL HYPERTENSION: ICD-10-CM

## 2022-11-18 DIAGNOSIS — K40.91 UNILATERAL RECURRENT INGUINAL HERNIA WITHOUT OBSTRUCTION OR GANGRENE: Chronic | ICD-10-CM

## 2022-11-18 DIAGNOSIS — N50.89 SWELLING OF THE TESTICLES: Chronic | ICD-10-CM

## 2022-11-18 PROBLEM — G44.201 ACUTE INTRACTABLE TENSION-TYPE HEADACHE: Status: ACTIVE | Noted: 2022-11-18

## 2022-11-18 PROCEDURE — 99396 PREV VISIT EST AGE 40-64: CPT | Performed by: INTERNAL MEDICINE

## 2022-11-18 RX ORDER — LANOLIN ALCOHOL/MO/W.PET/CERES
1000 CREAM (GRAM) TOPICAL DAILY
Qty: 90 TABLET | Refills: 1
Start: 2022-11-18

## 2022-11-18 RX ORDER — LISINOPRIL AND HYDROCHLOROTHIAZIDE 12.5; 1 MG/1; MG/1
1 TABLET ORAL DAILY
Qty: 90 TABLET | Refills: 3 | Status: SHIPPED | OUTPATIENT
Start: 2022-11-18

## 2022-11-18 NOTE — ASSESSMENT & PLAN NOTE
- New onset of headache on the left parietal and temporal area about a month ago.  - There is slight tenderness at the left temple, but no prominence of the arteries.  - Ear exam is benign.  - Temporomandibular joint exam is benign.  - He does have tight musculature in the neck, which is worse on the left than the right.  - We have ordered a CT scan of the head to rule out any pathology causing headache.  - He may take Tylenol as needed.  - The patient will check the blood pressure to make sure it is controlled at home.  - He may use a moist heat pack on the neck to help with muscle spasms in the neck.  - Doing neck stretches through the workday can also help.

## 2022-11-18 NOTE — ASSESSMENT & PLAN NOTE
- He will continue lisinopril/hydrochlorothiazide daily.  - The patient will monitor the blood pressures at home and let us know if it is running higher than one 120s/70s mmHg.  - He will try to avoid salt in the diet.  - The patient will continue regular exercise.

## 2022-11-18 NOTE — ASSESSMENT & PLAN NOTE
- He will continue lifestyle control with low-fat diet and regular exercise.   Patient seen for 60 minute in person session this date. Patient reports doing better. Reports that there have not been any significant issues during the last week due to memory issues. Today, we worked on short term recall of a list of 5 unrelated words presented verbally. She was able to complete the recall of the words with fair+ performance with min/mod cues. Topic maintenance was good today as was word finding in conversation. Homework activities encouraged. Will continue. Carmen Loving.  Hassie Mcardle, MA/CCC-SLP  [id number]    CPT [id number]

## 2022-11-18 NOTE — ASSESSMENT & PLAN NOTE
- He will let us know if it recurs.  - It was probably inflammation that resolved with the cold pack.

## 2022-11-18 NOTE — ASSESSMENT & PLAN NOTE
- He will continue low-sugar, low-fat diet.  - The patient will continue to avoid large meals.  - He will continue regular exercise.

## 2022-11-18 NOTE — PROGRESS NOTES
Preventative Annual Visit    Colin Cheema  1975   [id number]    Patient Care Team:  Sherri Anna MD as PCP - Internal Medicine (Internal Medicine)    Chief Complaint::   Chief Complaint   Patient presents with   • Hyperlipidemia   • Hypertension        Subjective   History of Present Illness    Colin Cheema is a 47 y.o. male who presents for an Annual Wellness Visit.    Left testicle swelling  The patient states he had swelling in his right testicle and then stayed on the left testicle for about 5 days, and the swelling has gone down. He states that he thinks it might be related to a previous inguinal hernia. He states that he had hernia surgery a long time ago. He was referred to a surgeon a while back and they confirmed there was a recurrent left inguinal hernia. He states that he feels like it is back to normal now. He did not feel any tender nodules. He feels like the hernia has increased in size since he saw the surgeon. The patient reports he iced his testicles which helped.     Headaches  He states that he has had a lot of headaches on the left side of his head. He states that he has some tingling sensations coming up behind his ear and into the side of his head. He states that every now and again, even if he is seated at his computer, he might see some stars. He states that it goes away. He states that he has been having the headache for about a month. He states that it is not severe, but it is there every day. He states that it is not stopping him from living his life or doing his job. He states that he does not take any medication for the headache. He states that he avoids taking medication except for the blood pressure medication. He denies any changes in his vision. He denies any weakness in his arm or leg. He denies any nausea. He denies any pain in the ear, neck pain, or jaw pain. He states that the headache is a steady dull ache. He is tender to palpation in the left  [place] area. He states that he has noticed a decrease in hearing. He states that he carries a lot of stress in his shoulder.  He denies joint pains. He states that he has had a CT scan in the past when he had his shoulder surgery.    Skin lesion  He states that he has a spot behind his ear. He states that he has an appointment with the dermatologist in 01/2023.    Memory loss  He had COVID-19 in 08/2022, and really struggled. The patient reports he was exhausted. He could not get his blood pressure regulated for approximately 1 month. The patient reports he could not remember anything, and feels he is at about 95 to 96 percent on memory and mental acuity.     Weight management   He exercises 3 days this week. The patient rides his [employer]. He has lost a few pounds. The patient was at 217 pounds and now he is down to 214 pounds. He tries to not eat if he is not hungry.    Hyperlipidemia   LDL has improved from 105 to 97 mg/dL. Triglycerides improved from 156 down to 137 mg/dL.     Hypertension  His blood pressure today is 142/86 mmHg. He has not checked his blood pressure at home. The patient had a hectic day yesterday and had a cup of coffee this morning. He is not surprised that his blood pressure is a little elevated. The patient states that he can tell when it is elevated. He can monitor it over the next couple of weeks and report back if it gets high. The patient states that yesterday it was probably in the 150s mmHg. He states that he had an event after work last night. On recheck today it is 128/82 mmHg.     Health maintenance  The patient does not want an influenza vaccine today. He is up to date on the tetanus. The new COVID-19 booster is recommended, and he will get it in 02/2023. He has had one hepatitis A vaccine. The patient is due for his second hepatitis A vaccine and will get that at another time.      CHRONIC CONDITIONS    Patient Active Problem List   Diagnosis   • Back pain without radiculopathy    • Gastroesophageal reflux disease without esophagitis   • Esophageal dysphagia   • B12 deficiency   • Mixed hyperlipidemia   • Benign essential hypertension   • Seborrheic dermatitis   • Left-sided low back pain with sciatica   • Plantar fasciitis   • Fatigue   • Allergic rhinitis   • Annual physical exam   • Actinic keratosis   • Overweight with body mass index (BMI) of 29 to 29.9 in adult   • Paresthesia of left arm and leg   • Precordial pain   • Leg cramps   • Acute intractable tension-type headache   • Hemicrania continua   • COVID-19 virus infection   • Recurrent unilateral inguinal hernia   • Swelling of the testicles        Past Medical History:   Diagnosis Date   • Achilles bursitis or tendinitis 2/6/2019   • Acute stress disorder 2/6/2019   • Allergic    • Allergic rhinitis    • GERD (gastroesophageal reflux disease)    • Hyperlipidemia    • Hypertension    • Low back pain    • Numbness and tingling in left arm 12/29/2015   • Piriformis syndrome, left 05/06/2016   • Plantar fasciitis 02/12/2016    left foot   • Vitamin B 12 deficiency        Past Surgical History:   Procedure Laterality Date   • HERNIA REPAIR Bilateral 2001    Inguinal       Family History   Problem Relation Age of Onset   • Testicular cancer Father    • Colon cancer Other        Social History     Socioeconomic History   • Marital status:    Tobacco Use   • Smoking status: Never   • Smokeless tobacco: Never   Substance and Sexual Activity   • Alcohol use: Yes     Comment: socially       Allergies   Allergen Reactions   • Loratadine Unknown (See Comments)     Edgy feeling per pt          Current Outpatient Medications:   •  finasteride (PROPECIA) 1 MG tablet, TAKE ONE TABLET BY MOUTH DAILY, Disp: 30 tablet, Rfl: 0  •  ibuprofen (ADVIL,MOTRIN) 600 MG tablet, Take 1 tablet by mouth Every 6 (Six) Hours As Needed for Mild Pain ., Disp: 30 tablet, Rfl: 0  •  lisinopril-hydrochlorothiazide (PRINZIDE,ZESTORETIC) 10-12.5 MG per tablet,  "Take 1 tablet by mouth Daily., Disp: 90 tablet, Rfl: 3  •  vitamin B-12 (CYANOCOBALAMIN) 1000 MCG tablet, Take 1 tablet by mouth Daily., Disp: 90 tablet, Rfl: 1    Immunization History   Administered Date(s) Administered   • COVID-19 (PFIZER) PURPLE CAP 03/02/2021, 03/23/2021, 12/01/2021   • Hepatitis A 08/22/2018   • Influenza, Unspecified 09/16/2020   • Tdap 12/29/2015        Health Maintenance Due   Topic Date Due   • COVID-19 Vaccine (4 - Booster for Pfizer series) 01/26/2022   • ANNUAL PHYSICAL  07/28/2022        Vital Signs  Vitals:    11/18/22 0915 11/18/22 1007   BP: 142/86 128/82   BP Location: Left arm    Patient Position: Sitting    Cuff Size: Adult    Pulse: 62    Temp: 97.5 °F (36.4 °C)    TempSrc: Infrared    SpO2: 99%    Weight: 101 kg (223 lb 9.6 oz)    Height: 185.4 cm (72.99\")    PainSc: 0-No pain      BMI is >= 25 and <30. (Overweight) The following options were offered after discussion;: exercise counseling/recommendations and nutrition counseling/recommendations    Physical Exam  Vitals and nursing note reviewed.   Constitutional:       Appearance: He is well-developed.      Comments: Very mild overweight, but he appears trim. Abdomen relatively flat. He does have muscular shoulders and muscular build.   Eyes:      Conjunctiva/sclera: Conjunctivae normal.      Pupils: Pupils are equal, round, and reactive to light.   Neck:      Thyroid: No thyromegaly.   Cardiovascular:      Rate and Rhythm: Normal rate and regular rhythm.      Heart sounds: Normal heart sounds. No murmur heard.  Pulmonary:      Effort: Pulmonary effort is normal.      Breath sounds: Normal breath sounds. No wheezing.   Abdominal:      General: Bowel sounds are normal. There is no distension.      Palpations: Abdomen is soft. There is no mass.      Tenderness: There is no abdominal tenderness.   Musculoskeletal:         General: No tenderness. Normal range of motion.      Cervical back: Normal range of motion and neck supple. "   Lymphadenopathy:      Cervical: No cervical adenopathy.   Skin:     General: Skin is warm and dry.      Findings: No rash.   Neurological:      Mental Status: He is alert and oriented to person, place, and time.      Cranial Nerves: No cranial nerve deficit.      Sensory: No sensory deficit.      Coordination: Coordination normal.      Gait: Gait normal.      Comments: Cranial nerves intact. Gait normal.   Psychiatric:         Speech: Speech normal.         Behavior: Behavior normal.         Thought Content: Thought content normal.         Judgment: Judgment normal.          Procedures     Fall Risk Screen:  [facility] Fall Risk Assessment has not been completed.    Health Habits and Functional and Cognitive Screening:  No flowsheet data found.    Smoking Status:  Social History     Tobacco Use   Smoking Status Never   Smokeless Tobacco Never       Alcohol Consumption:  Social History     Substance and Sexual Activity   Alcohol Use Yes    Comment: socially       Depression [place]  PHQ-9:    PHQ-2/PHQ-9 Depression Screening 11/18/2022   Retired PHQ-9 Total Score -   Retired Total Score -   Little Interest or Pleasure in Doing Things 0-->not at all   Feeling Down, Depressed or Hopeless 0-->not at all   PHQ-9: Brief Depression Severity Measure Score 0        ACE III MINI        Labs  Results for orders placed or performed in visit on 11/15/22   Comprehensive Metabolic Panel    Specimen: Blood   Result Value Ref Range    Glucose 93 65 - 99 mg/dL    BUN 18 6 - 20 mg/dL    Creatinine 1.05 0.76 - 1.27 mg/dL    Sodium 136 136 - 145 mmol/L    Potassium 4.2 3.5 - 5.2 mmol/L    Chloride 99 98 - 107 mmol/L    CO2 28.1 22.0 - 29.0 mmol/L    Calcium 9.4 8.6 - 10.5 mg/dL    Total Protein 7.1 6.0 - 8.5 g/dL    Albumin 4.60 3.50 - 5.20 g/dL    ALT (SGPT) 28 1 - 41 U/L    AST (SGOT) 22 1 - 40 U/L    Alkaline Phosphatase 44 39 - 117 U/L    Total Bilirubin 0.7 0.0 - 1.2 mg/dL    Globulin 2.5 gm/dL    A/G Ratio 1.8 g/dL     BUN/Creatinine Ratio 17.1 7.0 - 25.0    Anion Gap 8.9 5.0 - 15.0 mmol/L    eGFR 88.1 >60.0 mL/min/1.73   Lipid Panel    Specimen: Blood   Result Value Ref Range    Total Cholesterol 160 0 - 200 mg/dL    Triglycerides 137 0 - 150 mg/dL    HDL Cholesterol 39 (L) 40 - 60 mg/dL    LDL Cholesterol  97 0 - 100 mg/dL    VLDL Cholesterol 24 5 - 40 mg/dL    LDL/HDL Ratio 2.40    TSH    Specimen: Blood   Result Value Ref Range    TSH 1.310 0.270 - 4.200 uIU/mL   Vitamin D,25-Hydroxy    Specimen: Blood   Result Value Ref Range    25 Hydroxy, Vitamin D 48.7 30.0 - 100.0 ng/ml   Vitamin B12    Specimen: Blood   Result Value Ref Range    Vitamin B-12 282 211 - 946 pg/mL   PSA Screen    Specimen: Blood   Result Value Ref Range    PSA 0.558 0.000 - 4.000 ng/mL   CBC Auto Differential    Specimen: Blood   Result Value Ref Range    WBC 5.71 3.40 - 10.80 10*3/mm3    RBC 5.42 4.14 - 5.80 10*6/mm3    Hemoglobin 15.7 13.0 - 17.7 g/dL    Hematocrit 45.4 37.5 - 51.0 %    MCV 83.8 79.0 - 97.0 fL    MCH 29.0 26.6 - 33.0 pg    MCHC 34.6 31.5 - 35.7 g/dL    RDW 12.4 12.3 - 15.4 %    RDW-SD 37.6 37.0 - 54.0 fl    MPV 10.0 6.0 - 12.0 fL    Platelets 274 140 - 450 10*3/mm3    Neutrophil % 66.7 42.7 - 76.0 %    Lymphocyte % 21.0 19.6 - 45.3 %    Monocyte % 7.7 5.0 - 12.0 %    Eosinophil % 3.7 0.3 - 6.2 %    Basophil % 0.7 0.0 - 1.5 %    Immature Grans % 0.2 0.0 - 0.5 %    Neutrophils, Absolute 3.81 1.70 - 7.00 10*3/mm3    Lymphocytes, Absolute 1.20 0.70 - 3.10 10*3/mm3    Monocytes, Absolute 0.44 0.10 - 0.90 10*3/mm3    Eosinophils, Absolute 0.21 0.00 - 0.40 10*3/mm3    Basophils, Absolute 0.04 0.00 - 0.20 10*3/mm3    Immature Grans, Absolute 0.01 0.00 - 0.05 10*3/mm3    nRBC 0.0 0.0 - 0.2 /100 WBC   Labs Reviewed:   Blood glucose normal.   Kidney function normal   Liver enzymes normal   Potassium normal   Thyroid normal  Vitamin D good at 48  PSA good at 0.558 ng/mL   B12 is low  Blood counts all normal    Assessment & Plan     Patient  Self-Management and Personalized Health Advice    The patient has been provided counseling and guidance about: diet, exercise, weight management and prevention of cardiac or vascular disease and preventive services including:   · Annual Wellness Visit (AWV).  Patient Instructions   Problem List Items Addressed This Visit        Cardiac and Vasculature    Mixed hyperlipidemia    Overview     Lifestyle control.         Current Assessment & Plan     - He will continue lifestyle control with low-fat diet and regular exercise.         Benign essential hypertension    Overview     Taking lisinopril/hydrochlorothiazide daily.            Current Assessment & Plan     - He will continue lisinopril/hydrochlorothiazide daily.  - The patient will monitor the blood pressures at home and let us know if it is running higher than one 120s/70s mmHg.  - He will try to avoid salt in the diet.  - The patient will continue regular exercise.         Relevant Medications    lisinopril-hydrochlorothiazide (PRINZIDE,ZESTORETIC) 10-12.5 MG per tablet    Other Relevant Orders    Urinalysis With Microscopic - Urine, Clean Catch    Microalbumin / Creatinine Urine Ratio - Urine, Clean Catch       Endocrine and Metabolic    B12 deficiency    Overview     Recheck labs         Current Assessment & Plan     - He will take a B12 1000 mcg tablet daily.         Relevant Medications    vitamin B-12 (CYANOCOBALAMIN) 1000 MCG tablet       Gastrointestinal Abdominal     Recurrent unilateral inguinal hernia (Chronic)    Current Assessment & Plan     - He is advised to avoid heavy lifting.  - The patient will watch for development of pain or enlargement of the hernia.  - He will let us know and follow up with the surgeon.            Genitourinary and Reproductive     Swelling of the testicles (Chronic)    Overview     R testicle was swollen for a few days then the left one.  He used a cold pack and the symptoms resolved.  He did not see any redness or feel  nodules.  He does have a small inguinal hernia recurrent on the left.         Current Assessment & Plan     - He will let us know if it recurs.  - It was probably inflammation that resolved with the cold pack.            Health Encounters    Annual physical exam - Primary    Current Assessment & Plan     - He is due for the second hepatitis A vaccine, which he will get later at our office.  - The patient is also encouraged to get an influenza vaccine this fall.  - He is encouraged to get the new COVID-19 valent booster around 02/2023.            Neuro    Hemicrania continua (Chronic)    Current Assessment & Plan     - New onset of headache on the left parietal and temporal area about a month ago.  - There is slight tenderness at the left temple, but no prominence of the arteries.  - Ear exam is benign.  - Temporomandibular joint exam is benign.  - He does have tight musculature in the neck, which is worse on the left than the right.  - We have ordered a CT scan of the head to rule out any pathology causing headache.  - He may take Tylenol as needed.  - The patient will check the blood pressure to make sure it is controlled at home.  - He may use a moist heat pack on the neck to help with muscle spasms in the neck.  - Doing neck stretches through the workday can also help.             Relevant Medications    ibuprofen (ADVIL,MOTRIN) 600 MG tablet    Other Relevant Orders    CT Head With & Without Contrast       Other    Overweight with body mass index (BMI) of 29 to 29.9 in adult (Chronic)    Overview                Current Assessment & Plan     - He will continue low-sugar, low-fat diet.  - The patient will continue to avoid large meals.  - He will continue regular exercise.         COVID-19 virus infection (Chronic)    Overview     Memory problems after Covid infection in August. Now improved.        Other Visit Diagnoses     Prostate cancer screening                 Diagnosis Plan   1. Annual physical exam         2. Benign essential hypertension  CBC & Differential    Comprehensive Metabolic Panel    Urinalysis With Microscopic - Urine, Clean Catch    Vitamin D,25-Hydroxy    Microalbumin / Creatinine Urine Ratio - Urine, Clean Catch    lisinopril-hydrochlorothiazide (PRINZIDE,ZESTORETIC) 10-12.5 MG per tablet      3. Hemicrania continua  CT Head With & Without Contrast      4. Mixed hyperlipidemia  Lipid Panel    TSH      5. B12 deficiency  Vitamin B12    vitamin B-12 (CYANOCOBALAMIN) 1000 MCG tablet      6. Overweight with body mass index (BMI) of 29 to 29.9 in adult        7. Swelling of the testicles        8. Unilateral recurrent inguinal hernia without obstruction or gangrene        9. COVID-19 virus infection        10. Prostate cancer screening  PSA Screen          Outpatient Encounter Medications as of 11/18/2022   Medication Sig Dispense Refill   • finasteride (PROPECIA) 1 MG tablet TAKE ONE TABLET BY MOUTH DAILY 30 tablet 0   • ibuprofen (ADVIL,MOTRIN) 600 MG tablet Take 1 tablet by mouth Every 6 (Six) Hours As Needed for Mild Pain . 30 tablet 0   • lisinopril-hydrochlorothiazide (PRINZIDE,ZESTORETIC) 10-12.5 MG per tablet Take 1 tablet by mouth Daily. 90 tablet 3   • [DISCONTINUED] lisinopril-hydrochlorothiazide (PRINZIDE,ZESTORETIC) 10-12.5 MG per tablet TAKE ONE TABLET BY MOUTH DAILY 90 tablet 3   • vitamin B-12 (CYANOCOBALAMIN) 1000 MCG tablet Take 1 tablet by mouth Daily. 90 tablet 1   • [DISCONTINUED] famotidine (PEPCID) 40 MG tablet Take 1 tablet by mouth Daily. 90 tablet 3   • [DISCONTINUED] hydrocortisone 2.5 % cream      • [DISCONTINUED] sulfacetamide (Bleph-10) 10 % ophthalmic solution Administer 1 drop to both eyes Every 3 (Three) Hours. 15 mL 0     No facility-administered encounter medications on file as of 11/18/2022.       Age appropriate preventive counseling done including age appropriate vaccines,regular  Mammogram and self breast exam, pap smear, colonoscopy, regular dental visits, mental  health, injury prevention such as wearing seat belt and preventing falls, healthy  nutrition, healthy weight, regular physical exercise. Alcohol use is moderate.  Tobacco history-none. Drug use-none.  STD's-not at risk.    Follow Up:  Follow up in 6 months.         An After Visit Summary and PPPS with all of these plans were given to the patient.      Note: Part of this note may be an electronic transcription/translation of spoken language to printed text using the Dragon Dictation System.     Sherri Anna MD     Transcribed from ambient dictation for Sherri Anna MD by Citlali Clifton.  11/18/22   12:13 EST    Patient or patient representative verbalized consent to the visit recording.  I have personally performed the services described in this document as transcribed by the above individual, and it is both accurate and complete.

## 2022-11-18 NOTE — ASSESSMENT & PLAN NOTE
- He is advised to avoid heavy lifting.  - The patient will watch for development of pain or enlargement of the hernia.  - He will let us know and follow up with the surgeon.

## 2022-11-18 NOTE — PROGRESS NOTES
"[facility]     Colin Cheema  1975   [id number]      Patient Care Team:  Sherri Anna MD as PCP - Internal Medicine (Internal Medicine)    Chief Complaint   Patient presents with   • Hyperlipidemia   • Hypertension            HPI  Patient is a 47 y.o. male presents with               CHRONIC CONDITIONS      Past Medical History:   Diagnosis Date   • Achilles bursitis or tendinitis 2/6/2019   • Acute stress disorder 2/6/2019   • Allergic    • Allergic rhinitis    • GERD (gastroesophageal reflux disease)    • Hyperlipidemia    • Hypertension    • Low back pain    • Numbness and tingling in left arm 12/29/2015   • Piriformis syndrome, left 05/06/2016   • Plantar fasciitis 02/12/2016    left foot   • Vitamin B 12 deficiency        Past Surgical History:   Procedure Laterality Date   • HERNIA REPAIR Bilateral 2001    Inguinal       Family History   Problem Relation Age of Onset   • Testicular cancer Father    • Colon cancer Other        Social History     Socioeconomic History   • Marital status:    Tobacco Use   • Smoking status: Never   • Smokeless tobacco: Never   Substance and Sexual Activity   • Alcohol use: Yes     Comment: socially       Allergies   Allergen Reactions   • Loratadine Unknown (See Comments)     Edgy feeling per pt        Review of Systems:     Review of Systems    Vital Signs  Vitals:    11/18/22 0915 11/18/22 1007   BP: 142/86 128/82   BP Location: Left arm    Patient Position: Sitting    Cuff Size: Adult    Pulse: 62    Temp: 97.5 °F (36.4 °C)    TempSrc: Infrared    SpO2: 99%    Weight: 101 kg (223 lb 9.6 oz)    Height: 185.4 cm (72.99\")    PainSc: 0-No pain      Body mass index is 29.51 kg/m².  {BMI is >= 25 and <30. (Overweight) The following options were offered after discussion;:[id number]}        Current Outpatient Medications:   •  finasteride (PROPECIA) 1 MG tablet, TAKE ONE TABLET BY MOUTH DAILY, Disp: 30 tablet, Rfl: 0  •  ibuprofen (ADVIL,MOTRIN) " "600 MG tablet, Take 1 tablet by mouth Every 6 (Six) Hours As Needed for Mild Pain ., Disp: 30 tablet, Rfl: 0  •  lisinopril-hydrochlorothiazide (PRINZIDE,ZESTORETIC) 10-12.5 MG per tablet, Take 1 tablet by mouth Daily., Disp: 90 tablet, Rfl: 3  •  vitamin B-12 (CYANOCOBALAMIN) 1000 MCG tablet, Take 1 tablet by mouth Daily., Disp: 90 tablet, Rfl: 1    Physical Exam:    Physical Exam     ACE III MINI        Results Review:    {Results Review:12241::\"I reviewed the patient's new clinical results.\"}    CMP:  Lab Results   Component Value Date    BUN 18 11/15/2022    CREATININE 1.05 11/15/2022    EGFRIFNONA 81 07/27/2021    EGFRIFAFRI 99 07/27/2021    BCR 17.1 11/15/2022     11/15/2022    K 4.2 11/15/2022    CO2 28.1 11/15/2022    CALCIUM 9.4 11/15/2022    PROTENTOTREF 7.1 07/27/2021    ALBUMIN 4.60 11/15/2022    LABGLOBREF 2.4 07/27/2021    LABIL2 2.0 07/27/2021    BILITOT 0.7 11/15/2022    ALKPHOS 44 11/15/2022    AST 22 11/15/2022    ALT 28 11/15/2022     HbA1c:  No results found for: HGBA1C  Microalbumin:  Lab Results   Component Value Date    MICROALBUR 13.1 08/04/2020     Lipid Panel  Lab Results   Component Value Date    CHOL 160 11/15/2022    TRIG 137 11/15/2022    HDL 39 (L) 11/15/2022    LDL 97 11/15/2022    AST 22 11/15/2022    ALT 28 11/15/2022       Medication Review: Medications reviewed and noted  Patient Instructions   Problem List Items Addressed This Visit        Cardiac and Vasculature    Mixed hyperlipidemia    Benign essential hypertension    Overview     Taking lisinopril/hydrochlorothiazide daily.            Relevant Medications    lisinopril-hydrochlorothiazide (PRINZIDE,ZESTORETIC) 10-12.5 MG per tablet    Other Relevant Orders    Urinalysis With Microscopic - Urine, Clean Catch    Microalbumin / Creatinine Urine Ratio - Urine, Clean Catch       Endocrine and Metabolic    B12 deficiency    Overview     Recheck labs         Relevant Medications    vitamin B-12 (CYANOCOBALAMIN) 1000 MCG " tablet       Gastrointestinal Abdominal     Recurrent unilateral inguinal hernia (Chronic)       Genitourinary and Reproductive     Swelling of the testicles (Chronic)    Overview     R testicle was swollen for a few days then the left one.  He used a cold pack and the symptoms resolved.  He did not see any redness or feel nodules.  He does have a small inguinal hernia recurrent on the left.            Health Encounters    Annual physical exam - Primary    Overview     Get a flu shot early September.  Second Hepatitis A vaccine also due.    Colonoscopy,traditionally recommended at age 50, is now being recommended earlier. We will discuss at next visit.            Neuro    Hemicrania continua (Chronic)    Relevant Medications    ibuprofen (ADVIL,MOTRIN) 600 MG tablet    Other Relevant Orders    CT Head With & Without Contrast       Other    Overweight with body mass index (BMI) of 29 to 29.9 in adult (Chronic)    Overview     Continue exercise and walking.  Continue to improve low fat low sugar diet and eat smaller portions at mealtime.    Try to lose about 10 lb over the next 6 months. Weigh every Monday morning to monitor progress.             COVID-19 virus infection (Chronic)    Overview     Memory problems after Covid infection in August. Now improved.        Other Visit Diagnoses     Prostate cancer screening                 Diagnosis Plan   1. Annual physical exam        2. Benign essential hypertension  CBC & Differential    Comprehensive Metabolic Panel    Urinalysis With Microscopic - Urine, Clean Catch    Vitamin D,25-Hydroxy    Microalbumin / Creatinine Urine Ratio - Urine, Clean Catch    lisinopril-hydrochlorothiazide (PRINZIDE,ZESTORETIC) 10-12.5 MG per tablet      3. Hemicrania continua  CT Head With & Without Contrast      4. Mixed hyperlipidemia  Lipid Panel    TSH      5. B12 deficiency  Vitamin B12    vitamin B-12 (CYANOCOBALAMIN) 1000 MCG tablet      6. Overweight with body mass index (BMI) of 29  to 29.9 in adult        7. Swelling of the testicles        8. Unilateral recurrent inguinal hernia without obstruction or gangrene        9. COVID-19 virus infection        10. Prostate cancer screening  PSA Screen          {Time Spent (Optional):29433}    Plan of care reviewed with patient at the conclusion of today's visit. Education was provided regarding diagnosis, management, and any prescribed or recommended OTC medications.Patient verbalizes understanding of and agreement with management plan.         Sherri Anna MD

## 2022-11-18 NOTE — PATIENT INSTRUCTIONS
Patient Instructions   Problem List Items Addressed This Visit          Cardiac and Vasculature    Mixed hyperlipidemia    Overview     Lifestyle control.         Current Assessment & Plan     - He will continue lifestyle control with low-fat diet and regular exercise.         Benign essential hypertension    Overview     Taking lisinopril/hydrochlorothiazide daily.            Current Assessment & Plan     - He will continue lisinopril/hydrochlorothiazide daily.  - The patient will monitor the blood pressures at home and let us know if it is running higher than one 120s/70s mmHg.  - He will try to avoid salt in the diet.  - The patient will continue regular exercise.         Relevant Medications    lisinopril-hydrochlorothiazide (PRINZIDE,ZESTORETIC) 10-12.5 MG per tablet    Other Relevant Orders    Urinalysis With Microscopic - Urine, Clean Catch    Microalbumin / Creatinine Urine Ratio - Urine, Clean Catch       Endocrine and Metabolic    B12 deficiency    Overview     Recheck labs         Current Assessment & Plan     - He will take a B12 1000 mcg tablet daily.         Relevant Medications    vitamin B-12 (CYANOCOBALAMIN) 1000 MCG tablet       Gastrointestinal Abdominal     Recurrent unilateral inguinal hernia (Chronic)    Current Assessment & Plan     - He is advised to avoid heavy lifting.  - The patient will watch for development of pain or enlargement of the hernia.  - He will let us know and follow up with the surgeon.            Genitourinary and Reproductive     Swelling of the testicles (Chronic)    Overview     R testicle was swollen for a few days then the left one.  He used a cold pack and the symptoms resolved.  He did not see any redness or feel nodules.  He does have a small inguinal hernia recurrent on the left.         Current Assessment & Plan     - He will let us know if it recurs.  - It was probably inflammation that resolved with the cold pack.            Health Encounters    Annual physical  exam - Primary    Current Assessment & Plan     - He is due for the second hepatitis A vaccine, which he will get later at our office.  - The patient is also encouraged to get an influenza vaccine this fall.  - He is encouraged to get the new COVID-19 valent booster around 02/2023.            Neuro    Hemicrania continua (Chronic)    Current Assessment & Plan     - New onset of headache on the left parietal and temporal area about a month ago.  - There is slight tenderness at the left temple, but no prominence of the arteries.  - Ear exam is benign.  - Temporomandibular joint exam is benign.  - He does have tight musculature in the neck, which is worse on the left than the right.  - We have ordered a CT scan of the head to rule out any pathology causing headache.  - He may take Tylenol as needed.  - The patient will check the blood pressure to make sure it is controlled at home.  - He may use a moist heat pack on the neck to help with muscle spasms in the neck.  - Doing neck stretches through the workday can also help.             Relevant Medications    ibuprofen (ADVIL,MOTRIN) 600 MG tablet    Other Relevant Orders    CT Head With & Without Contrast       Other    Overweight with body mass index (BMI) of 29 to 29.9 in adult (Chronic)    Overview                Current Assessment & Plan     - He will continue low-sugar, low-fat diet.  - The patient will continue to avoid large meals.  - He will continue regular exercise.         COVID-19 virus infection (Chronic)    Overview     Memory problems after Covid infection in August. Now improved.          Other Visit Diagnoses       Prostate cancer screening              BMI for Adults  What is BMI?  Body mass index (BMI) is a number that is calculated from a person's weight and height. BMI can help estimate how much of a person's weight is composed of fat. BMI does not measure body fat directly. Rather, it is an alternative to procedures that directly measure body fat,  "which can be difficult and expensive.  BMI can help identify people who may be at higher risk for certain medical problems.  What are BMI measurements used for?  BMI is used as a screening tool to identify possible weight problems. It helps determine whether a person is obese, overweight, a healthy weight, or underweight.  BMI is useful for:  Identifying a weight problem that may be related to a medical condition or may increase the risk for medical problems.  Promoting changes, such as changes in diet and exercise, to help reach a healthy weight. BMI screening can be repeated to see if these changes are working.  How is BMI calculated?  BMI involves measuring your weight in relation to your height. Both height and weight are measured, and the BMI is calculated from those numbers. This can be done either in English (U.S[place] or metric measurements. Note that charts and online BMI calculators are available to help you find your BMI quickly and easily without having to do these calculations yourself.  To calculate your BMI in English (U.S[place] measurements:    Measure your weight in pounds (lb).  Multiply the number of pounds by 703.  For example, for a person who weighs 180 lb, multiply that number by 703, which equals 126,540.  Measure your height in inches. Then multiply that number by itself to get a measurement called \"inches squared.\"  For example, for a person who is 70 inches tall, the \"inches squared\" measurement is 70 inches x 70 inches, which equals 4,900 inches squared.  Divide the total from step 2 (number of lb x 703) by the total from step 3 (inches squared): 126,540 ÷ 4,900 = 25.8. This is your BMI.  To calculate your BMI in metric measurements:  Measure your weight in kilograms (kg).  Measure your height in meters (m). Then multiply that number by itself to get a measurement called \"meters squared.\"  For example, for a person who is 1.75 m tall, the \"meters squared\" measurement is 1.75 m x 1.75 m, which is " equal to 3.1 meters squared.  Divide the number of kilograms (your weight) by the meters squared number. In this example: 70 ÷ 3.1 = 22.6. This is your BMI.  What do the results mean?  BMI charts are used to identify whether you are underweight, normal weight, overweight, or obese. The following guidelines will be used:  Underweight: BMI less than 18.5.  Normal weight: BMI between 18.5 and 24.9.  Overweight: BMI between 25 and 29.9.  Obese: BMI of 30 or above.  Keep these notes in mind:  Weight includes both fat and muscle, so someone with a muscular build, such as an athlete, may have a BMI that is higher than 24.9. In cases like these, BMI is not an accurate measure of body fat.  To determine if excess body fat is the cause of a BMI of 25 or higher, further assessments may need to be done by a health care provider.  BMI is usually interpreted in the same way for men and women.  Where to find more information  For more information about BMI, including tools to quickly calculate your BMI, go to these websites:  Centers for Disease Control and Prevention: www.cdc.gov  American Heart Association: www.heart.org  National Heart, Lung, and Blood [place]: www.nhlbi.nih.gov  Summary  Body mass index (BMI) is a number that is calculated from a person's weight and height.  BMI may help estimate how much of a person's weight is composed of fat. BMI can help identify those who may be at higher risk for certain medical problems.  BMI can be measured using English measurements or metric measurements.  BMI charts are used to identify whether you are underweight, normal weight, overweight, or obese.  This information is not intended to replace advice given to you by your health care provider. Make sure you discuss any questions you have with your health care provider.  Document Revised: 09/09/2020 Document Reviewed: 07/17/2020  [employer] Patient Education © 2022 [employer]

## 2022-11-18 NOTE — ASSESSMENT & PLAN NOTE
- He is due for the second hepatitis A vaccine, which he will get later at our office.  - The patient is also encouraged to get an influenza vaccine this fall.  - He is encouraged to get the new COVID-19 valent booster around 02/2023.

## 2023-05-19 ENCOUNTER — TELEPHONE (OUTPATIENT)
Dept: INTERNAL MEDICINE | Facility: CLINIC | Age: 48
End: 2023-05-19

## 2023-05-19 ENCOUNTER — OFFICE VISIT (OUTPATIENT)
Dept: INTERNAL MEDICINE | Facility: CLINIC | Age: 48
End: 2023-05-19
Payer: COMMERCIAL

## 2023-05-19 VITALS
SYSTOLIC BLOOD PRESSURE: 140 MMHG | HEART RATE: 56 BPM | BODY MASS INDEX: 28.92 KG/M2 | OXYGEN SATURATION: 98 % | WEIGHT: 218.2 LBS | TEMPERATURE: 98.6 F | DIASTOLIC BLOOD PRESSURE: 84 MMHG | HEIGHT: 73 IN

## 2023-05-19 DIAGNOSIS — R07.2 PRECORDIAL PAIN: Primary | ICD-10-CM

## 2023-05-19 DIAGNOSIS — E66.3 OVERWEIGHT WITH BODY MASS INDEX (BMI) OF 28 TO 28.9 IN ADULT: Chronic | ICD-10-CM

## 2023-05-19 DIAGNOSIS — I10 BENIGN ESSENTIAL HYPERTENSION: ICD-10-CM

## 2023-05-19 DIAGNOSIS — E78.2 MIXED HYPERLIPIDEMIA: ICD-10-CM

## 2023-05-19 DIAGNOSIS — R20.2 PARESTHESIA OF LEFT ARM: Chronic | ICD-10-CM

## 2023-05-19 DIAGNOSIS — K21.9 GASTROESOPHAGEAL REFLUX DISEASE WITHOUT ESOPHAGITIS: ICD-10-CM

## 2023-05-19 DIAGNOSIS — E53.8 B12 DEFICIENCY: ICD-10-CM

## 2023-05-19 PROBLEM — M54.9 UPPER BACK PAIN ON LEFT SIDE: Chronic | Status: ACTIVE | Noted: 2023-05-19

## 2023-05-19 PROBLEM — U07.1 COVID-19 VIRUS INFECTION: Chronic | Status: RESOLVED | Noted: 2022-11-18 | Resolved: 2023-05-19

## 2023-05-19 RX ORDER — LANOLIN ALCOHOL/MO/W.PET/CERES
1000 CREAM (GRAM) TOPICAL DAILY
Qty: 90 TABLET | Refills: 1 | Status: SHIPPED | OUTPATIENT
Start: 2023-05-19

## 2023-05-19 NOTE — PATIENT INSTRUCTIONS
Patient Instructions   Problem List Items Addressed This Visit          Cardiac and Vasculature    Mixed hyperlipidemia    Overview     Lifestyle control.         Current Assessment & Plan     The patient will continue to decrease fats and sugars in the diet. He will continue to lose more weight. The patient has lost 5 pounds over the last 6 months. He will continue regular exercise and walking and running.         Relevant Orders    CT Cardiac Calcium Score Without Dye    Benign essential hypertension    Overview     Taking lisinopril/hydrochlorothiazide daily.            Current Assessment & Plan     He will continue lisinopril/hydrochlorothiazide daily. The patient will work on decreasing the salt in the diet more. He will continue to avoid sodas.         Relevant Medications    lisinopril-hydrochlorothiazide (PRINZIDE,ZESTORETIC) 10-12.5 MG per tablet    Other Relevant Orders    CT Cardiac Calcium Score Without Dye    Precordial pain - Primary    Overview     Precordial pain which is intermittent, sometimes sharp and stabbing, not exertional, random, without associated symptoms of shortness of breath or diaphoresis or palpitations or edema or nausea.  Tingling in the left upper extremity is most likely related to the tight musculature in the left upper back and not cardiac in nature.  Most likely not cardiac in nature.  EKG today is sinus bradycardia at 54 bpm.  Possibly stress related.  Possibly GI in nature, esophageal spasm.         Current Assessment & Plan     I have ordered CT calcium scoring of the heart. We will make further plans depending on the results of that test. Patient was reassured that this pain is probably not cardiac, but we will definitely check it out and be proactive.         Relevant Orders    CT Cardiac Calcium Score Without Dye    ECG 12 Lead       Endocrine and Metabolic    B12 deficiency    Overview     Recheck labs         Relevant Medications    vitamin B-12 (CYANOCOBALAMIN) 1000  MCG tablet       Gastrointestinal Abdominal     Gastroesophageal reflux disease without esophagitis    Overview     EGD by Dr. Lopez 2/20/2021 showing diffuse esophagitis.  Patient was reluctant to take pantoprazole daily. Controlling symptoms with diet.         Current Assessment & Plan     He will continue to avoid eating close to bedtime and avoid large meals. Continue to avoid alcohol in the evenings. Avoid carbonated beverages. Avoid excessive caffeine and chocolate.            Symptoms and Signs    Paresthesia of left arm (Chronic)    Current Assessment & Plan     This could be related to low B12 level. He was deficient in 11/2022 and is not currently taking any B12 replacement. We will have him resume B12 1000 mcg tablet daily. It could also be related to the left upper back spasm adjacent to the scapula.            Other    Overweight with body mass index (BMI) of 28 to 28.9 in adult (Chronic)    Overview                Current Assessment & Plan     He will continue regular walking and running. Continue to decrease fats, sugars, and carbohydrates in the diet. Eat smaller portions at mealtime. Avoid snacking in the evenings. Try to lose 5 more pounds over the next 6 months.             BMI for Adults  What is BMI?  Body mass index (BMI) is a number that is calculated from a person's weight and height. BMI can help estimate how much of a person's weight is composed of fat. BMI does not measure body fat directly. Rather, it is an alternative to procedures that directly measure body fat, which can be difficult and expensive.  BMI can help identify people who may be at higher risk for certain medical problems.  What are BMI measurements used for?  BMI is used as a screening tool to identify possible weight problems. It helps determine whether a person is obese, overweight, a healthy weight, or underweight.  BMI is useful for:  Identifying a weight problem that may be related to a medical condition or may  "increase the risk for medical problems.  Promoting changes, such as changes in diet and exercise, to help reach a healthy weight. BMI screening can be repeated to see if these changes are working.  How is BMI calculated?  BMI involves measuring your weight in relation to your height. Both height and weight are measured, and the BMI is calculated from those numbers. This can be done either in English (U.S.) or metric measurements. Note that charts and online BMI calculators are available to help you find your BMI quickly and easily without having to do these calculations yourself.  To calculate your BMI in English (U.S.) measurements:    Measure your weight in pounds (lb).  Multiply the number of pounds by 703.  For example, for a person who weighs 180 lb, multiply that number by 703, which equals 126,540.  Measure your height in inches. Then multiply that number by itself to get a measurement called \"inches squared.\"  For example, for a person who is 70 inches tall, the \"inches squared\" measurement is 70 inches x 70 inches, which equals 4,900 inches squared.  Divide the total from step 2 (number of lb x 703) by the total from step 3 (inches squared): 126,540 ÷ 4,900 = 25.8. This is your BMI.  To calculate your BMI in metric measurements:  Measure your weight in kilograms (kg).  Measure your height in meters (m). Then multiply that number by itself to get a measurement called \"meters squared.\"  For example, for a person who is 1.75 m tall, the \"meters squared\" measurement is 1.75 m x 1.75 m, which is equal to 3.1 meters squared.  Divide the number of kilograms (your weight) by the meters squared number. In this example: 70 ÷ 3.1 = 22.6. This is your BMI.  What do the results mean?  BMI charts are used to identify whether you are underweight, normal weight, overweight, or obese. The following guidelines will be used:  Underweight: BMI less than 18.5.  Normal weight: BMI between 18.5 and 24.9.  Overweight: BMI between " 25 and 29.9.  Obese: BMI of 30 or above.  Keep these notes in mind:  Weight includes both fat and muscle, so someone with a muscular build, such as an athlete, may have a BMI that is higher than 24.9. In cases like these, BMI is not an accurate measure of body fat.  To determine if excess body fat is the cause of a BMI of 25 or higher, further assessments may need to be done by a health care provider.  BMI is usually interpreted in the same way for men and women.  Where to find more information  For more information about BMI, including tools to quickly calculate your BMI, go to these websites:  Centers for Disease Control and Prevention: www.cdc.gov  American Heart Association: www.heart.org  National Heart, Lung, and Blood [place]: www.nhlbi.nih.gov  Summary  Body mass index (BMI) is a number that is calculated from a person's weight and height.  BMI may help estimate how much of a person's weight is composed of fat. BMI can help identify those who may be at higher risk for certain medical problems.  BMI can be measured using English measurements or metric measurements.  BMI charts are used to identify whether you are underweight, normal weight, overweight, or obese.  This information is not intended to replace advice given to you by your health care provider. Make sure you discuss any questions you have with your health care provider.  Document Revised: 09/09/2020 Document Reviewed: 07/17/2020  [employer] Patient Education © 2022 [email or web address]

## 2023-05-19 NOTE — Clinical Note
Please call patient or send him a portal message that I noticed after he left that the B12 was low in November and he is not taking any B12 supplement.  I sent a B12 supplement to his pharmacy.  He should take 1 every day.  Low B12 can cause some numbness and tingling.

## 2023-05-19 NOTE — ASSESSMENT & PLAN NOTE
I have ordered CT calcium scoring of the heart. We will make further plans depending on the results of that test. Patient was reassured that this pain is probably not cardiac, but we will definitely check it out and be proactive.

## 2023-05-19 NOTE — PROGRESS NOTES
[facility]     Colin Cheema  1975   [id number]      Patient Care Team:  Sherri Anna MD as PCP - General (Internal Medicine)  Sherri Anna MD as PCP - Internal Medicine (Internal Medicine)    Chief Complaint   Patient presents with   • Hypertension            HPI  Patient is a 47 y.o. male who presents today for a 6-month follow-up.    Chest pain  The patient states that he has been experiencing sharp pains on the left side of his chest. He states that he was playing golf on Wednesday, 05/17/2023, and it happened. It takes his breath away for a few seconds. He has had some tingling in his left arm. The patient states that from time to time, he will see spots like stars. He states that this has been happening on and off for 1 to 2 months. It is a steady tightness and then the sharp pain is constantly. The pain is not brought on by exertion; he could be just sitting. It started out happening every once in a while and now it seems to be happening more frequently. It occurs a few times a week, or even once or twice a day. The tightness goes away as well. He is more concerned about the sharp pain. The patient is unsure if stress bring it on or not. He does not run far anymore. The patient walks and jogs a mile and a half. He states that he went for a bike ride with one of his friends and they rode 27 miles. The patient did not get that pain with the riding or running. He states his heart rate is really high in the 160s when he runs. When he rides, he is usually in the 130s unless he is really pushing it out. It is in the 140s if he is climbing a hill. He is unsure if the tingling in the left arm goes with the chest pain, but would say yes. It seems like it is about the same time.    Left upper back  He has pain in his left upper back by his shoulder blade. It does get worse when he is on the computer. He does not feel pain in his neck. It can come on with exercise. He has not been  taking ibuprofen lately.    GERD  He has been doing pretty good with his reflux. It has not been worse lately. He is not taking anything for reflux right now. He states chicken and turkey are hard to swallow and it will get stuck. The patient has to chew and chew. He had an EGD with Dr. Jesus in 2022. The patient did have diffuse esophagitis or inflammation in the esophagus. He is not taking ibuprofen lately.    Hypertension  He is taking lisinopril/hydrochlorothiazide every day. The patient does not check his blood pressure at home. He does not try to avoid salt in his diet. His EKG in 02/2022 was normal. The patient's grandfather had quadruple bypass surgery in his mid 60s.    Hair loss  He states that he will take 1 Propecia tablet a month or so.      CHRONIC CONDITIONS      Past Medical History:   Diagnosis Date   • Achilles bursitis or tendinitis 2/6/2019   • Acute stress disorder 2/6/2019   • Allergic    • Allergic rhinitis    • COVID-19 virus infection 11/18/2022    Memory problems after Covid infection in August. Now improved.   • GERD (gastroesophageal reflux disease)    • Hyperlipidemia    • Hypertension    • Low back pain    • Numbness and tingling in left arm 12/29/2015   • Piriformis syndrome, left 05/06/2016   • Plantar fasciitis 02/12/2016    left foot   • Vitamin B 12 deficiency        Past Surgical History:   Procedure Laterality Date   • HERNIA REPAIR Bilateral 2001    Inguinal       Family History   Problem Relation Age of Onset   • Testicular cancer Father    • Coronary artery disease Maternal Grandfather    • Colon cancer Other        Social History     Socioeconomic History   • Marital status:    Tobacco Use   • Smoking status: Never   • Smokeless tobacco: Never   Substance and Sexual Activity   • Alcohol use: Yes     Comment: socially       Allergies   Allergen Reactions   • Loratadine Unknown (See Comments)     Edgy feeling per pt          Vital Signs  Vitals:    05/19/23 0833   BP:  "140/84   BP Location: Right arm   Patient Position: Sitting   Cuff Size: Adult   Pulse: 56   Temp: 98.6 °F (37 °C)   TempSrc: Infrared   SpO2: 98%   Weight: 99 kg (218 lb 3.2 oz)   Height: 185.4 cm (72.99\")   PainSc: 0-No pain     Body mass index is 28.8 kg/m².  BMI is >= 25 and <30. (Overweight) The following options were offered after discussion;: weight loss educational material (shared in after visit summary), exercise counseling/recommendations and nutrition counseling/recommendations        Current Outpatient Medications:   •  lisinopril-hydrochlorothiazide (PRINZIDE,ZESTORETIC) 10-12.5 MG per tablet, Take 1 tablet by mouth Daily., Disp: 90 tablet, Rfl: 3  •  vitamin B-12 (CYANOCOBALAMIN) 1000 MCG tablet, Take 1 tablet by mouth Daily., Disp: 90 tablet, Rfl: 1    Physical Exam:    Physical Exam  Vitals and nursing note reviewed.   Constitutional:       Appearance: He is well-developed.      Comments: Mild overweight.   HENT:      Head: Normocephalic.   Eyes:      Conjunctiva/sclera: Conjunctivae normal.      Pupils: Pupils are equal, round, and reactive to light.   Neck:      Thyroid: No thyromegaly.   Cardiovascular:      Rate and Rhythm: Normal rate and regular rhythm.      Heart sounds: Normal heart sounds.   Pulmonary:      Effort: Pulmonary effort is normal.      Breath sounds: Normal breath sounds.   Musculoskeletal:         General: Normal range of motion.      Cervical back: Normal range of motion and neck supple.      Right foot: No deformity.      Left foot: No deformity.      Comments: Tight musculature adjacent to the left upper scapula.   Feet:      Right foot:      Skin integrity: No ulcer, skin breakdown, erythema or callus.      Left foot:      Skin integrity: No ulcer, skin breakdown, erythema or callus.   Lymphadenopathy:      Cervical: No cervical adenopathy.   Neurological:      Mental Status: He is alert and oriented to person, place, and time.      Comments: Normal gait. Normal motor. " Normal sensation.   Psychiatric:         Thought Content: Thought content normal.       ECG 12 Lead    Date/Time: 5/19/2023 12:38 PM  Performed by: Sherri Anna MD  Authorized by: Sherri Anna MD   Comparison: compared with previous ECG   Similar to previous ECG  Rhythm: sinus bradycardia  Rate: normal  BPM: 54  Conduction: conduction normal  ST Segments: ST segments normal  T Waves: T waves normal  QRS axis: normal    Clinical impression: normal ECG               ACE III MINI        Results Review:    None    CMP:  Lab Results   Component Value Date    BUN 18 11/15/2022    CREATININE 1.05 11/15/2022    EGFRIFNONA 81 07/27/2021    EGFRIFAFRI 99 07/27/2021    BCR 17.1 11/15/2022     11/15/2022    K 4.2 11/15/2022    CO2 28.1 11/15/2022    CALCIUM 9.4 11/15/2022    PROTENTOTREF 7.1 07/27/2021    ALBUMIN 4.60 11/15/2022    LABGLOBREF 2.4 07/27/2021    LABIL2 2.0 07/27/2021    BILITOT 0.7 11/15/2022    ALKPHOS 44 11/15/2022    AST 22 11/15/2022    ALT 28 11/15/2022     HbA1c:  No results found for: HGBA1C  Microalbumin:  Lab Results   Component Value Date    MICROALBUR 13.1 08/04/2020     Lipid Panel  Lab Results   Component Value Date    CHOL 160 11/15/2022    TRIG 137 11/15/2022    HDL 39 (L) 11/15/2022    LDL 97 11/15/2022    AST 22 11/15/2022    ALT 28 11/15/2022       Medication Review: Medications reviewed and noted  Patient Instructions   Problem List Items Addressed This Visit        Cardiac and Vasculature    Mixed hyperlipidemia    Overview     Lifestyle control.         Current Assessment & Plan     The patient will continue to decrease fats and sugars in the diet. He will continue to lose more weight. The patient has lost 5 pounds over the last 6 months. He will continue regular exercise and walking and running.         Relevant Orders    CT Cardiac Calcium Score Without Dye    Benign essential hypertension    Overview     Taking lisinopril/hydrochlorothiazide daily.            Current  Assessment & Plan     He will continue lisinopril/hydrochlorothiazide daily. The patient will work on decreasing the salt in the diet more. He will continue to avoid sodas.         Relevant Medications    lisinopril-hydrochlorothiazide (PRINZIDE,ZESTORETIC) 10-12.5 MG per tablet    Other Relevant Orders    CT Cardiac Calcium Score Without Dye    Precordial pain - Primary    Overview     Precordial pain which is intermittent, sometimes sharp and stabbing, not exertional, random, without associated symptoms of shortness of breath or diaphoresis or palpitations or edema or nausea.  Tingling in the left upper extremity is most likely related to the tight musculature in the left upper back and not cardiac in nature.  Most likely not cardiac in nature.  EKG today is sinus bradycardia at 54 bpm.  Possibly stress related.  Possibly GI in nature, esophageal spasm.         Current Assessment & Plan     I have ordered CT calcium scoring of the heart. We will make further plans depending on the results of that test. Patient was reassured that this pain is probably not cardiac, but we will definitely check it out and be proactive.         Relevant Orders    CT Cardiac Calcium Score Without Dye    ECG 12 Lead       Endocrine and Metabolic    B12 deficiency    Overview     Recheck labs         Relevant Medications    vitamin B-12 (CYANOCOBALAMIN) 1000 MCG tablet       Gastrointestinal Abdominal     Gastroesophageal reflux disease without esophagitis    Overview     EGD by Dr. Lopez 2/20/2021 showing diffuse esophagitis.  Patient was reluctant to take pantoprazole daily. Controlling symptoms with diet.         Current Assessment & Plan     He will continue to avoid eating close to bedtime and avoid large meals. Continue to avoid alcohol in the evenings. Avoid carbonated beverages. Avoid excessive caffeine and chocolate.            Symptoms and Signs    Paresthesia of left arm (Chronic)    Current Assessment & Plan     This could  be related to low B12 level. He was deficient in 11/2022 and is not currently taking any B12 replacement. We will have him resume B12 1000 mcg tablet daily. It could also be related to the left upper back spasm adjacent to the scapula.            Other    Overweight with body mass index (BMI) of 28 to 28.9 in adult (Chronic)    Overview                Current Assessment & Plan     He will continue regular walking and running. Continue to decrease fats, sugars, and carbohydrates in the diet. Eat smaller portions at mealtime. Avoid snacking in the evenings. Try to lose 5 more pounds over the next 6 months.                Diagnosis Plan   1. Precordial pain  CT Cardiac Calcium Score Without Dye    ECG 12 Lead      2. Mixed hyperlipidemia  CT Cardiac Calcium Score Without Dye      3. Benign essential hypertension  CT Cardiac Calcium Score Without Dye      4. Paresthesia of left arm        5. Gastroesophageal reflux disease without esophagitis        6. Overweight with body mass index (BMI) of 28 to 28.9 in adult        7. B12 deficiency  vitamin B-12 (CYANOCOBALAMIN) 1000 MCG tablet        Follow-up: He will come back for annual physical in 6 months.    I spent 50 minutes caring for Colin on this date of service. This time includes time spent by me in the following activities:preparing for the visit, reviewing tests, performing a medically appropriate examination and/or evaluation , counseling and educating the patient/family/caregiver, ordering medications, tests, or procedures and documenting information in the medical record    Plan of care reviewed with patient at the conclusion of today's visit. Education was provided regarding diagnosis, management, and any prescribed or recommended OTC medications.Patient verbalizes understanding of and agreement with management plan.         Sherri Anna MD      Transcribed from ambient dictation for Sherri Anna MD by Citlali Clifton.  05/19/23   09:58 EDT    Patient  or patient representative verbalized consent to the visit recording.  I have personally performed the services described in this document as transcribed by the above individual, and it is both accurate and complete.  Sherri Anna MD  5/19/2023  12:42 EDT

## 2023-05-19 NOTE — TELEPHONE ENCOUNTER
"Called pt; relayed message from Dr. Anna. He understood and verbalized understanding:    \"Please call patient or send him a portal message that I noticed after he left that the B12 was low in November and he is not taking any B12 supplement.  I sent a B12 supplement to his pharmacy.  He should take 1 every day.  Low B12 can cause some numbness and tingling.  \"  "

## 2023-05-19 NOTE — ASSESSMENT & PLAN NOTE
He will continue lisinopril/hydrochlorothiazide daily. The patient will work on decreasing the salt in the diet more. He will continue to avoid sodas.

## 2023-05-19 NOTE — ASSESSMENT & PLAN NOTE
He will continue to avoid eating close to bedtime and avoid large meals. Continue to avoid alcohol in the evenings. Avoid carbonated beverages. Avoid excessive caffeine and chocolate.

## 2023-05-19 NOTE — ASSESSMENT & PLAN NOTE
He will continue regular walking and running. Continue to decrease fats, sugars, and carbohydrates in the diet. Eat smaller portions at mealtime. Avoid snacking in the evenings. Try to lose 5 more pounds over the next 6 months.

## 2023-05-19 NOTE — ASSESSMENT & PLAN NOTE
This could be related to low B12 level. He was deficient in 11/2022 and is not currently taking any B12 replacement. We will have him resume B12 1000 mcg tablet daily. It could also be related to the left upper back spasm adjacent to the scapula.

## 2023-05-19 NOTE — ASSESSMENT & PLAN NOTE
The patient will continue to decrease fats and sugars in the diet. He will continue to lose more weight. The patient has lost 5 pounds over the last 6 months. He will continue regular exercise and walking and running.

## 2023-11-20 ENCOUNTER — OFFICE VISIT (OUTPATIENT)
Dept: INTERNAL MEDICINE | Facility: CLINIC | Age: 48
End: 2023-11-20
Payer: COMMERCIAL

## 2023-11-20 VITALS
DIASTOLIC BLOOD PRESSURE: 82 MMHG | HEART RATE: 66 BPM | OXYGEN SATURATION: 97 % | HEIGHT: 73 IN | TEMPERATURE: 98.6 F | WEIGHT: 224 LBS | BODY MASS INDEX: 29.69 KG/M2 | SYSTOLIC BLOOD PRESSURE: 138 MMHG

## 2023-11-20 DIAGNOSIS — E53.8 B12 DEFICIENCY: ICD-10-CM

## 2023-11-20 DIAGNOSIS — Z71.89 ENCOUNTER FOR CARDIAC RISK COUNSELING: ICD-10-CM

## 2023-11-20 DIAGNOSIS — Z00.00 ANNUAL PHYSICAL EXAM: Primary | ICD-10-CM

## 2023-11-20 DIAGNOSIS — M25.562 ACUTE PAIN OF LEFT KNEE: Chronic | ICD-10-CM

## 2023-11-20 DIAGNOSIS — E66.3 OVERWEIGHT WITH BODY MASS INDEX (BMI) OF 29 TO 29.9 IN ADULT: Chronic | ICD-10-CM

## 2023-11-20 DIAGNOSIS — E78.2 MIXED HYPERLIPIDEMIA: ICD-10-CM

## 2023-11-20 DIAGNOSIS — I10 BENIGN ESSENTIAL HYPERTENSION: ICD-10-CM

## 2023-11-20 DIAGNOSIS — R42 DIZZINESS AND GIDDINESS: Chronic | ICD-10-CM

## 2023-11-20 DIAGNOSIS — R07.2 PRECORDIAL PAIN: ICD-10-CM

## 2023-11-20 RX ORDER — LISINOPRIL AND HYDROCHLOROTHIAZIDE 20; 12.5 MG/1; MG/1
1 TABLET ORAL DAILY
Qty: 90 TABLET | Refills: 1 | Status: SHIPPED | OUTPATIENT
Start: 2023-11-20

## 2023-11-20 NOTE — ASSESSMENT & PLAN NOTE
His pain correlates with the stress in his job. We will go on and do a CT cardiac calcium scoring since his grandfather did have heart disease. He will continue cardiac risk reduction with better blood pressure control, cholesterol control, weight loss, and regular exercise.

## 2023-11-20 NOTE — ASSESSMENT & PLAN NOTE
We will increase the lisinopril-hydrochlorothiazide from the 10/12.5 mg tablet up to the 20/12.5 mg tablet daily. Decrease salt in the diet. Continue regular exercise. Monitor the blood pressures at home.

## 2023-11-20 NOTE — ASSESSMENT & PLAN NOTE
He was given some knee exercises today. He will continue using a cold pack on the knee as needed to decrease pain, inflammation, or swelling. If not improving, he will let us know and we will order physical therapy.

## 2023-11-20 NOTE — PROGRESS NOTES
Preventative Annual Visit    Colin Cheema  1975   [id number]    Patient Care Team:  Sherri Anna MD as PCP - General (Internal Medicine)  Sherri Anna MD as PCP - Internal Medicine (Internal Medicine)    Chief Complaint::   Chief Complaint   Patient presents with    Annual Exam    Knee Pain     Left knee. Injured in summer 2023        Subjective   History of Present Illness    Colin Cheema is a 48 y.o. male who presents for an Annual Wellness Visit.     Lightheadedness and dizziness  The patient has been really lightheaded and dizzy a lot lately. He went for a walk in the morning and the sun hit him in a certain way and he almost went down. The patient was not running. He thinks the sun could have caused the dizziness. The patient denies a headache. He is dizzy right now.    Family history  The patient's grandfather had bypass surgery.      CHRONIC CONDITIONS    Patient Active Problem List   Diagnosis    Back pain without radiculopathy    Gastroesophageal reflux disease without esophagitis    Esophageal dysphagia    B12 deficiency    Mixed hyperlipidemia    Benign essential hypertension    Seborrheic dermatitis    Left-sided low back pain with sciatica    Plantar fasciitis    Fatigue    Allergic rhinitis    Annual physical exam    Actinic keratosis    Overweight with body mass index (BMI) of 29 to 29.9 in adult    Paresthesia of left arm    Precordial pain    Leg cramps    Acute intractable tension-type headache    Hemicrania continua    Recurrent unilateral inguinal hernia    Swelling of the testicles    Upper back pain on left side    Acute pain of left knee    Encounter for cardiac risk counseling    Dizziness and giddiness        Past Medical History:   Diagnosis Date    Achilles bursitis or tendinitis 2/6/2019    Acute stress disorder 2/6/2019    Allergic     Allergic rhinitis     COVID-19 virus infection 11/18/2022    Memory problems after Covid infection in August. Now  improved.    GERD (gastroesophageal reflux disease)     Hyperlipidemia     Hypertension     Low back pain     Numbness and tingling in left arm 12/29/2015    Piriformis syndrome, left 05/06/2016    Plantar fasciitis 02/12/2016    left foot    Vitamin B 12 deficiency        Past Surgical History:   Procedure Laterality Date    HERNIA REPAIR Bilateral 2001    Inguinal       Family History   Problem Relation Age of Onset    Testicular cancer Father     Coronary artery disease Maternal Grandfather     Colon cancer Other        Social History     Socioeconomic History    Marital status:    Tobacco Use    Smoking status: Never    Smokeless tobacco: Never   Substance and Sexual Activity    Alcohol use: Yes     Comment: socially       Allergies   Allergen Reactions    Loratadine Unknown (See Comments)     Edgy feeling per pt          Current Outpatient Medications:     vitamin B-12 (CYANOCOBALAMIN) 1000 MCG tablet, Take 1 tablet by mouth Daily. (Patient taking differently: Take 1 tablet by mouth 1 (One) Time Per Week.), Disp: 90 tablet, Rfl: 1    lisinopril-hydrochlorothiazide (PRINZIDE,ZESTORETIC) 20-12.5 MG per tablet, Take 1 tablet by mouth Daily., Disp: 90 tablet, Rfl: 1    Immunization History   Administered Date(s) Administered    COVID-19 (PFIZER) Purple Cap Monovalent 03/02/2021, 03/23/2021, 12/01/2021    DTP 08/27/1980    Hepatitis A 08/22/2018    Influenza, Unspecified 09/16/2020    MMR 01/02/1976    OPV 08/27/1980    Tdap 12/29/2015        Health Maintenance Due   Topic Date Due    INFLUENZA VACCINE  08/01/2023    COVID-19 Vaccine (4 - 2023-24 season) 09/01/2023    LIPID PANEL  11/15/2023    ANNUAL PHYSICAL  11/18/2023        Review of Systems     Vital Signs  Vitals:    11/20/23 0936 11/20/23 1001   BP: 148/82 138/82   BP Location: Left arm Left arm   Patient Position: Sitting Sitting   Cuff Size: Adult Adult   Pulse: 66    Temp: 98.6 °F (37 °C)    TempSrc: Infrared    SpO2: 97%    Weight: 102 kg (224 lb)  "   Height: 185.4 cm (72.99\")      BMI is >= 25 and <30. (Overweight) The following options were offered after discussion;: weight loss educational material (shared in after visit summary), exercise counseling/recommendations, nutrition counseling/recommendations, and Information on healthy weight added to patient's after visit summary.     Physical Exam  Constitutional:       Comments: Overweight.   Cardiovascular:      Comments:  Positive peripheral pulses. No edema.  Psychiatric:      Comments: Normal attention, normal mood.          Procedures     Fall Risk Screen:  Critical access hospital Fall Risk Assessment has not been completed.    Health Habits and Functional and Cognitive Screening:       No data to display                Smoking Status:  Social History     Tobacco Use   Smoking Status Never   Smokeless Tobacco Never       Alcohol Consumption:  Social History     Substance and Sexual Activity   Alcohol Use Yes    Comment: socially       Depression [place]  PHQ-9:        5/19/2023     8:36 AM   PHQ-2/PHQ-9 Depression Screening   Little Interest or Pleasure in Doing Things 0-->not at all   Feeling Down, Depressed or Hopeless 0-->not at all   PHQ-9: Brief Depression Severity Measure Score 0           Labs  Results for orders placed or performed in visit on 11/15/22   Comprehensive Metabolic Panel    Specimen: Blood   Result Value Ref Range    Glucose 93 65 - 99 mg/dL    BUN 18 6 - 20 mg/dL    Creatinine 1.05 0.76 - 1.27 mg/dL    Sodium 136 136 - 145 mmol/L    Potassium 4.2 3.5 - 5.2 mmol/L    Chloride 99 98 - 107 mmol/L    CO2 28.1 22.0 - 29.0 mmol/L    Calcium 9.4 8.6 - 10.5 mg/dL    Total Protein 7.1 6.0 - 8.5 g/dL    Albumin 4.60 3.50 - 5.20 g/dL    ALT (SGPT) 28 1 - 41 U/L    AST (SGOT) 22 1 - 40 U/L    Alkaline Phosphatase 44 39 - 117 U/L    Total Bilirubin 0.7 0.0 - 1.2 mg/dL    Globulin 2.5 gm/dL    A/G Ratio 1.8 g/dL    BUN/Creatinine Ratio 17.1 7.0 - 25.0    Anion Gap 8.9 5.0 - 15.0 mmol/L    eGFR 88.1 >60.0 " mL/min/1.73   Lipid Panel    Specimen: Blood   Result Value Ref Range    Total Cholesterol 160 0 - 200 mg/dL    Triglycerides 137 0 - 150 mg/dL    HDL Cholesterol 39 (L) 40 - 60 mg/dL    LDL Cholesterol  97 0 - 100 mg/dL    VLDL Cholesterol 24 5 - 40 mg/dL    LDL/HDL Ratio 2.40    TSH    Specimen: Blood   Result Value Ref Range    TSH 1.310 0.270 - 4.200 uIU/mL   Vitamin D,25-Hydroxy    Specimen: Blood   Result Value Ref Range    25 Hydroxy, Vitamin D 48.7 30.0 - 100.0 ng/ml   Vitamin B12    Specimen: Blood   Result Value Ref Range    Vitamin B-12 282 211 - 946 pg/mL   PSA Screen    Specimen: Blood   Result Value Ref Range    PSA 0.558 0.000 - 4.000 ng/mL   CBC Auto Differential    Specimen: Blood   Result Value Ref Range    WBC 5.71 3.40 - 10.80 10*3/mm3    RBC 5.42 4.14 - 5.80 10*6/mm3    Hemoglobin 15.7 13.0 - 17.7 g/dL    Hematocrit 45.4 37.5 - 51.0 %    MCV 83.8 79.0 - 97.0 fL    MCH 29.0 26.6 - 33.0 pg    MCHC 34.6 31.5 - 35.7 g/dL    RDW 12.4 12.3 - 15.4 %    RDW-SD 37.6 37.0 - 54.0 fl    MPV 10.0 6.0 - 12.0 fL    Platelets 274 140 - 450 10*3/mm3    Neutrophil % 66.7 42.7 - 76.0 %    Lymphocyte % 21.0 19.6 - 45.3 %    Monocyte % 7.7 5.0 - 12.0 %    Eosinophil % 3.7 0.3 - 6.2 %    Basophil % 0.7 0.0 - 1.5 %    Immature Grans % 0.2 0.0 - 0.5 %    Neutrophils, Absolute 3.81 1.70 - 7.00 10*3/mm3    Lymphocytes, Absolute 1.20 0.70 - 3.10 10*3/mm3    Monocytes, Absolute 0.44 0.10 - 0.90 10*3/mm3    Eosinophils, Absolute 0.21 0.00 - 0.40 10*3/mm3    Basophils, Absolute 0.04 0.00 - 0.20 10*3/mm3    Immature Grans, Absolute 0.01 0.00 - 0.05 10*3/mm3    nRBC 0.0 0.0 - 0.2 /100 WBC        Assessment & Plan     Patient Self-Management and Personalized Health Advice    The patient has been provided counseling and guidance about: diet, exercise, weight management, and prevention of cardiac or vascular disease and preventive services including:   Annual Wellness Visit (AWV).  Patient Instructions   Problem List Items  Addressed This Visit          Cardiac and Vasculature    Mixed hyperlipidemia    Overview     Lifestyle control.         Current Assessment & Plan     Continue regular exercise. Work on eating less carbohydrates, sugars, and fatty foods. Decrease portion sizes at mealtime. Weigh at home once per week to monitor progress.         Relevant Orders    CT Cardiac Calcium Score Without Dye    Homocysteine    Lipid Panel    TSH    High Sensitivity CRP    Benign essential hypertension    Overview     Taking lisinopril/hydrochlorothiazide daily.            Current Assessment & Plan     We will increase the lisinopril-hydrochlorothiazide from the 10/12.5 mg tablet up to the 20/12.5 mg tablet daily. Decrease salt in the diet. Continue regular exercise. Monitor the blood pressures at home.         Relevant Medications    lisinopril-hydrochlorothiazide (PRINZIDE,ZESTORETIC) 20-12.5 MG per tablet    Other Relevant Orders    CT Cardiac Calcium Score Without Dye    CBC & Differential    Comprehensive Metabolic Panel    Microalbumin / Creatinine Urine Ratio - Urine, Clean Catch    Urinalysis With Microscopic - Urine, Clean Catch    Precordial pain    Overview     Precordial pain which is intermittent, sometimes sharp and stabbing, not exertional, random, without associated symptoms of shortness of breath or diaphoresis or palpitations or edema or nausea.  Tingling in the left upper extremity is most likely related to the tight musculature in the left upper back and not cardiac in nature.  Most likely not cardiac in nature.  EKG today is sinus bradycardia at 54 bpm.  Probably stress related.           Current Assessment & Plan     His pain correlates with the stress in his job. We will go on and do a CT cardiac calcium scoring since his grandfather did have heart disease. He will continue cardiac risk reduction with better blood pressure control, cholesterol control, weight loss, and regular exercise.         Encounter for cardiac  risk counseling    Relevant Orders    CT Cardiac Calcium Score Without Dye       Endocrine and Metabolic    B12 deficiency    Overview     Recheck labs         Current Assessment & Plan     Recheck level today.         Relevant Medications    vitamin B-12 (CYANOCOBALAMIN) 1000 MCG tablet    Other Relevant Orders    Vitamin B12       Health Encounters    Annual physical exam - Primary       Musculoskeletal and Injuries    Acute pain of left knee (Chronic)    Current Assessment & Plan     He was given some knee exercises today. He will continue using a cold pack on the knee as needed to decrease pain, inflammation, or swelling. If not improving, he will let us know and we will order physical therapy.            Symptoms and Signs    Dizziness and giddiness (Chronic)    Current Assessment & Plan     It may be related to the higher blood pressures and/or stress. He is also cautioned to hydrate well before exercising and to use electrolyte drinks while exercising.            Other    Overweight with body mass index (BMI) of 29 to 29.9 in adult (Chronic)    Overview                Current Assessment & Plan     Continue regular exercise. Work on eating less carbohydrates, sugars, and fatty foods. Decrease portion sizes at mealtime. Weigh at home once per week to monitor progress. Goal is to get down to 210 pounds.         Relevant Orders    CT Cardiac Calcium Score Without Dye    Hemoglobin A1c          Diagnosis Plan   1. Annual physical exam        2. Mixed hyperlipidemia  CT Cardiac Calcium Score Without Dye    Homocysteine    Lipid Panel    TSH    High Sensitivity CRP      3. Benign essential hypertension  CT Cardiac Calcium Score Without Dye    CBC & Differential    Comprehensive Metabolic Panel    Microalbumin / Creatinine Urine Ratio - Urine, Clean Catch    Urinalysis With Microscopic - Urine, Clean Catch      4. Precordial pain        5. Dizziness and giddiness        6. Overweight with body mass index (BMI) of 29  to 29.9 in adult  CT Cardiac Calcium Score Without Dye    Hemoglobin A1c      7. Acute pain of left knee        8. B12 deficiency  Vitamin B12      9. Encounter for cardiac risk counseling  CT Cardiac Calcium Score Without Dye          Outpatient Encounter Medications as of 11/20/2023   Medication Sig Dispense Refill    vitamin B-12 (CYANOCOBALAMIN) 1000 MCG tablet Take 1 tablet by mouth Daily. (Patient taking differently: Take 1 tablet by mouth 1 (One) Time Per Week.) 90 tablet 1    [DISCONTINUED] lisinopril-hydrochlorothiazide (PRINZIDE,ZESTORETIC) 10-12.5 MG per tablet Take 1 tablet by mouth Daily. 90 tablet 3    lisinopril-hydrochlorothiazide (PRINZIDE,ZESTORETIC) 20-12.5 MG per tablet Take 1 tablet by mouth Daily. 90 tablet 1     No facility-administered encounter medications on file as of 11/20/2023.     Age appropriate preventive counseling done including age appropriate vaccines,regular  Mammogram and self breast exam, pap smear, colonoscopy, regular dental visits, mental health, injury prevention such as wearing seat belt and preventing falls, healthy  nutrition, healthy weight, regular physical exercise. Alcohol use is moderate.  Tobacco history-none. Drug use-none.  STD's-not at risk.    Follow Up:  Return in about 6 weeks (around 1/1/2024) for Recheck BP and dizziness.         An After Visit Summary and PPPS with all of these plans were given to the patient.      Additional E&M Note during same encounter follows:  Patient has multiple medical problems which are significant and separately identifiable that require additional work above and beyond the Medicare Wellness Visit.      Chief Complaint  Annual Exam and Knee Pain (Left knee. Injured in summer 2023)    Subjective        HPI  Colin Guzmán Richimadhavankita is also being seen today for hypertension, chest pain, and knee pain.    Hypertension  The patient's blood pressure today is 148/82 mmHg. He states that his diastolic blood pressure is never bad, but his  systolic blood pressure is always bad. If he checks his blood pressure first thing in the morning, he can get a different number. It can get in the 130s mmHg, but a lot of times it is in the higher 140s mmHg in the afternoon and evening. He has stress at work with people calling in sick. The patient takes his lisinopril/hydrochlorothiazide 10/12.5 mg every morning. He does not try to eat too much salt. The patient does not drink any sodas. He exercises regularly. The patient goes for a walk almost every day. The patient asked if weight loss would allow him to stay on the same medication. He had a low blood pressure incident when he was cycling in the past. He will increase the dose of his lisinopril/hydrochlorothiazide to 20/12.5 mg once daily. On recheck his blood pressure is 138/82 mmHg. His goal is 120/70s mmHg.    Chest pain  The patient still gets chest pain in the left chest and upper back when he is super stressed. The patient does not get chest pain when he is walking fast. He can still ride his [employer] workout. The patient does not get chest pain with that. He does not wake up worrying and thinking about what he has to do the next day. The patient goes to bed and goes to sleep. He had an EKG at his last visit which looked fine with bradycardia at 54 beats per minute. The CT with calcium scoring  is ordered for the patient.    Left knee pain  The patient injured his left knee in 08/2023. He jumped up and felt his knee go. The patient felt sudden pain, but it did not feel structural. It felt more like a ligament pain medially. The patient has been trying to do some strengthening exercises. He does not see swelling. The patient can jog, but he is not sure about running right now. He tries to do a little bit of yoga in the morning. The patient could not do poses on his knee at first, but now he can. He has used ice packs which has been helpful. The patient is given some knee exercises.       Objective   Vital  "Signs:  /82 (BP Location: Left arm, Patient Position: Sitting, Cuff Size: Adult)   Pulse 66   Temp 98.6 °F (37 °C) (Infrared)   Ht 185.4 cm (72.99\")   Wt 102 kg (224 lb)   SpO2 97%   BMI 29.56 kg/m²     The following data was reviewed by: Sherri Anna MD on 11/20/2023:           Assessment and Plan   Diagnoses and all orders for this visit:    1. Annual physical exam (Primary)    2. Mixed hyperlipidemia  Assessment & Plan:  Continue regular exercise. Work on eating less carbohydrates, sugars, and fatty foods. Decrease portion sizes at mealtime. Weigh at home once per week to monitor progress.    Orders:  -     CT Cardiac Calcium Score Without Dye; Future  -     Homocysteine; Future  -     Lipid Panel; Future  -     TSH; Future  -     High Sensitivity CRP; Future    3. Benign essential hypertension  Assessment & Plan:  We will increase the lisinopril-hydrochlorothiazide from the 10/12.5 mg tablet up to the 20/12.5 mg tablet daily. Decrease salt in the diet. Continue regular exercise. Monitor the blood pressures at home.    Orders:  -     CT Cardiac Calcium Score Without Dye; Future  -     CBC & Differential; Future  -     Comprehensive Metabolic Panel; Future  -     Microalbumin / Creatinine Urine Ratio - Urine, Clean Catch; Future  -     Urinalysis With Microscopic - Urine, Clean Catch; Future    4. Precordial pain  Assessment & Plan:  His pain correlates with the stress in his job. We will go on and do a CT cardiac calcium scoring since his grandfather did have heart disease. He will continue cardiac risk reduction with better blood pressure control, cholesterol control, weight loss, and regular exercise.      5. Dizziness and giddiness  Assessment & Plan:  It may be related to the higher blood pressures and/or stress. He is also cautioned to hydrate well before exercising and to use electrolyte drinks while exercising.      6. Overweight with body mass index (BMI) of 29 to 29.9 in " adult  Assessment & Plan:  Continue regular exercise. Work on eating less carbohydrates, sugars, and fatty foods. Decrease portion sizes at mealtime. Weigh at home once per week to monitor progress. Goal is to get down to 210 pounds.    Orders:  -     CT Cardiac Calcium Score Without Dye; Future  -     Hemoglobin A1c; Future    7. Acute pain of left knee  Assessment & Plan:  He was given some knee exercises today. He will continue using a cold pack on the knee as needed to decrease pain, inflammation, or swelling. If not improving, he will let us know and we will order physical therapy.      8. B12 deficiency  Assessment & Plan:  Recheck level today.    Orders:  -     Vitamin B12; Future    9. Encounter for cardiac risk counseling  -     CT Cardiac Calcium Score Without Dye; Future    Other orders  -     lisinopril-hydrochlorothiazide (PRINZIDE,ZESTORETIC) 20-12.5 MG per tablet; Take 1 tablet by mouth Daily.  Dispense: 90 tablet; Refill: 1             Follow Up   Return in about 6 weeks (around 1/1/2024) for Recheck BP and dizziness.  Patient was given instructions and counseling regarding his condition or for health maintenance advice. Please see specific information pulled into the AVS if appropriate.     Note: Part of this note may be an electronic transcription/translation of spoken language to printed text using the Dragon Dictation System.     Sherri Anna MD       Transcribed from ambient dictation for Sherri Anna MD by Citlali Clifton.  11/20/23   11:50 EST    Patient or patient representative verbalized consent to the visit recording.  I have personally performed the services described in this document as transcribed by the above individual, and it is both accurate and complete.

## 2023-11-20 NOTE — ASSESSMENT & PLAN NOTE
Continue regular exercise. Work on eating less carbohydrates, sugars, and fatty foods. Decrease portion sizes at mealtime. Weigh at home once per week to monitor progress.

## 2023-11-20 NOTE — ASSESSMENT & PLAN NOTE
It may be related to the higher blood pressures and/or stress. He is also cautioned to hydrate well before exercising and to use electrolyte drinks while exercising.

## 2023-11-20 NOTE — ASSESSMENT & PLAN NOTE
Continue regular exercise. Work on eating less carbohydrates, sugars, and fatty foods. Decrease portion sizes at mealtime. Weigh at home once per week to monitor progress. Goal is to get down to 210 pounds.

## 2023-11-21 NOTE — PATIENT INSTRUCTIONS
Patient Instructions  Problem List Items Addressed This Visit          Cardiac and Vasculature    Mixed hyperlipidemia    Overview     Lifestyle control.         Current Assessment & Plan     Continue regular exercise. Work on eating less carbohydrates, sugars, and fatty foods. Decrease portion sizes at mealtime. Weigh at home once per week to monitor progress.         Relevant Orders    CT Cardiac Calcium Score Without Dye    Homocysteine    Lipid Panel    TSH    High Sensitivity CRP    Benign essential hypertension    Overview     Taking lisinopril/hydrochlorothiazide daily.            Current Assessment & Plan     We will increase the lisinopril-hydrochlorothiazide from the 10/12.5 mg tablet up to the 20/12.5 mg tablet daily. Decrease salt in the diet. Continue regular exercise. Monitor the blood pressures at home.         Relevant Medications    lisinopril-hydrochlorothiazide (PRINZIDE,ZESTORETIC) 20-12.5 MG per tablet    Other Relevant Orders    CT Cardiac Calcium Score Without Dye    CBC & Differential    Comprehensive Metabolic Panel    Microalbumin / Creatinine Urine Ratio - Urine, Clean Catch    Urinalysis With Microscopic - Urine, Clean Catch    Precordial pain    Overview     Precordial pain which is intermittent, sometimes sharp and stabbing, not exertional, random, without associated symptoms of shortness of breath or diaphoresis or palpitations or edema or nausea.  Tingling in the left upper extremity is most likely related to the tight musculature in the left upper back and not cardiac in nature.  Most likely not cardiac in nature.  EKG today is sinus bradycardia at 54 bpm.  Probably stress related.           Current Assessment & Plan     His pain correlates with the stress in his job. We will go on and do a CT cardiac calcium scoring since his grandfather did have heart disease. He will continue cardiac risk reduction with better blood pressure control, cholesterol control, weight loss, and regular  exercise.         Encounter for cardiac risk counseling    Relevant Orders    CT Cardiac Calcium Score Without Dye       Endocrine and Metabolic    B12 deficiency    Overview     Recheck labs         Current Assessment & Plan     Recheck level today.         Relevant Medications    vitamin B-12 (CYANOCOBALAMIN) 1000 MCG tablet    Other Relevant Orders    Vitamin B12       Health Encounters    Annual physical exam - Primary       Musculoskeletal and Injuries    Acute pain of left knee (Chronic)    Current Assessment & Plan     He was given some knee exercises today. He will continue using a cold pack on the knee as needed to decrease pain, inflammation, or swelling. If not improving, he will let us know and we will order physical therapy.            Symptoms and Signs    Dizziness and giddiness (Chronic)    Current Assessment & Plan     It may be related to the higher blood pressures and/or stress. He is also cautioned to hydrate well before exercising and to use electrolyte drinks while exercising.            Other    Overweight with body mass index (BMI) of 29 to 29.9 in adult (Chronic)    Overview                Current Assessment & Plan     Continue regular exercise. Work on eating less carbohydrates, sugars, and fatty foods. Decrease portion sizes at mealtime. Weigh at home once per week to monitor progress. Goal is to get down to 210 pounds.         Relevant Orders    CT Cardiac Calcium Score Without Dye    Hemoglobin A1c       Exercising to Stay Healthy  To become healthy and stay healthy, it is recommended that you do moderate-intensity and vigorous-intensity exercise. You can tell that you are exercising at a moderate intensity if your heart starts beating faster and you start breathing faster but can still hold a conversation. You can tell that you are exercising at a vigorous intensity if you are breathing much harder and faster and cannot hold a conversation while exercising.  How can exercise benefit  me?  Exercising regularly is important. It has many health benefits, such as:  Improving overall fitness, flexibility, and endurance.  Increasing bone density.  Helping with weight control.  Decreasing body fat.  Increasing muscle strength and endurance.  Reducing stress and tension, anxiety, depression, or anger.  Improving overall health.  What guidelines should I follow while exercising?  Before you start a new exercise program, talk with your health care provider.  Do not exercise so much that you hurt yourself, feel dizzy, or get very short of breath.  Wear comfortable clothes and wear shoes with good support.  Drink plenty of water while you exercise to prevent dehydration or heat stroke.  Work out until your breathing and your heartbeat get faster (moderate intensity).  How often should I exercise?  Choose an activity that you enjoy, and set realistic goals. Your health care provider can help you make an activity plan that is individually designed and works best for you.  Exercise regularly as told by your health care provider. This may include:  Doing strength training two times a week, such as:  Lifting weights.  Using resistance bands.  Push-ups.  Sit-ups.  Yoga.  Doing a certain intensity of exercise for a given amount of time. Choose from these options:  A total of 150 minutes of moderate-intensity exercise every week.  A total of 75 minutes of vigorous-intensity exercise every week.  A mix of moderate-intensity and vigorous-intensity exercise every week.  Children, pregnant women, people who have not exercised regularly, people who are overweight, and older adults may need to talk with a health care provider about what activities are safe to perform. If you have a medical condition, be sure to talk with your health care provider before you start a new exercise program.  What are some exercise ideas?  Moderate-intensity exercise ideas include:  Walking 1 mile (1.6 km) in about 15  minutes.  Biking.  Hiking.  Golfing.  Dancing.  Water aerobics.  Vigorous-intensity exercise ideas include:  Walking 4.5 miles (7.2 km) or more in about 1 hour.  Jogging or running 5 miles (8 km) in about 1 hour.  Biking 10 miles (16.1 km) or more in about 1 hour.  Lap swimming.  Roller-skating or in-line skating.  Cross-country skiing.  Vigorous competitive sports, such as football, basketball, and soccer.  Jumping rope.  Aerobic dancing.  What are some everyday activities that can help me get exercise?  Yard work, such as:  Pushing a .  Raking and bagging leaves.  Washing your car.  Pushing a stroller.  Shoveling snow.  Gardening.  Washing windows or floors.  How can I be more active in my day-to-day activities?  Use stairs instead of an elevator.  Take a walk during your lunch break.  If you drive, park your car farther away from your work or school.  If you take public transportation, get off one stop early and walk the rest of the way.  Stand up or walk around during all of your indoor phone calls.  Get up, stretch, and walk around every 30 minutes throughout the day.  Enjoy exercise with a friend. Support to continue exercising will help you keep a regular routine of activity.  Where to find more information  You can find more information about exercising to stay healthy from:  [place] Department of Health and Human Services: www.hhs.gov  Centers for Disease Control and Prevention (CDC): www.cdc.gov  Summary  Exercising regularly is important. It will improve your overall fitness, flexibility, and endurance.  Regular exercise will also improve your overall health. It can help you control your weight, reduce stress, and improve your bone density.  Do not exercise so much that you hurt yourself, feel dizzy, or get very short of breath.  Before you start a new exercise program, talk with your health care provider.  This information is not intended to replace advice given to you by your health care  "provider. Make sure you discuss any questions you have with your health care provider.  Document Revised: 04/15/2022 Document Reviewed: 04/15/2022  [employer] Patient Education © 2023 [email or web address] BMI for Adults  What is BMI?  Body mass index (BMI) is a number that is calculated from a person's weight and height. BMI can help estimate how much of a person's weight is composed of fat. BMI does not measure body fat directly. Rather, it is an alternative to procedures that directly measure body fat, which can be difficult and expensive.  BMI can help identify people who may be at higher risk for certain medical problems.  What are BMI measurements used for?  BMI is used as a screening tool to identify possible weight problems. It helps determine whether a person is obese, overweight, a healthy weight, or underweight.  BMI is useful for:  Identifying a weight problem that may be related to a medical condition or may increase the risk for medical problems.  Promoting changes, such as changes in diet and exercise, to help reach a healthy weight. BMI screening can be repeated to see if these changes are working.  How is BMI calculated?  BMI involves measuring your weight in relation to your height. Both height and weight are measured, and the BMI is calculated from those numbers. This can be done either in English ([place] or metric measurements. Note that charts and online BMI calculators are available to help you find your BMI quickly and easily without having to do these calculations yourself.  To calculate your BMI in English ([place] measurements:    Measure your weight in pounds (lb).  Multiply the number of pounds by 703.  For example, for a person who weighs 180 lb, multiply that number by 703, which equals [phone].  Measure your height in inches. Then multiply that number by itself to get a measurement called \"inches squared.\"  For example, for a person who is 70 inches tall, the \"inches squared\" measurement is 70 " "inches x 70 inches, which equals 4,900 inches squared.  Divide the total from step 2 (number of lb x 703) by the total from step 3 (inches squared): 126,540 ÷ 4,900 = 25.8. This is your BMI.  To calculate your BMI in metric measurements:  Measure your weight in kilograms (kg).  Measure your height in meters (m). Then multiply that number by itself to get a measurement called \"meters squared.\"  For example, for a person who is 1.75 m tall, the \"meters squared\" measurement is 1.75 m x 1.75 m, which is equal to 3.1 meters squared.  Divide the number of kilograms (your weight) by the meters squared number. In this example: 70 ÷ 3.1 = 22.6. This is your BMI.  What do the results mean?  BMI charts are used to identify whether you are underweight, normal weight, overweight, or obese. The following guidelines will be used:  Underweight: BMI less than 18.5.  Normal weight: BMI between 18.5 and 24.9.  Overweight: BMI between 25 and 29.9.  Obese: BMI of 30 or above.  Keep these notes in mind:  Weight includes both fat and muscle, so someone with a muscular build, such as an athlete, may have a BMI that is higher than 24.9. In cases like these, BMI is not an accurate measure of body fat.  To determine if excess body fat is the cause of a BMI of 25 or higher, further assessments may need to be done by a health care provider.  BMI is usually interpreted in the same way for men and women.  Where to find more information  For more information about BMI, including tools to quickly calculate your BMI, go to these websites:  Centers for Disease Control and Prevention: www.cdc.gov  American Heart Association: www.heart.org  National Heart, Lung, and Blood [place]: www.nhlbi.nih.gov  Summary  Body mass index (BMI) is a number that is calculated from a person's weight and height.  BMI may help estimate how much of a person's weight is composed of fat. BMI can help identify those who may be at higher risk for certain medical " problems.  BMI can be measured using English measurements or metric measurements.  BMI charts are used to identify whether you are underweight, normal weight, overweight, or obese.  This information is not intended to replace advice given to you by your health care provider. Make sure you discuss any questions you have with your health care provider.  Document Revised: 05/31/2023 Document Reviewed: 07/17/2020  [employer] Patient Education © 2023 [employer]  Heart-Healthy Eating Plan  Many factors influence your heart (coronary) health, including eating and exercise habits. Coronary risk increases with abnormal blood fat (lipid) levels. Heart-healthy meal planning includes limiting unhealthy fats, increasing healthy fats, and making other diet and lifestyle changes.  What is my plan?  Your health care provider may recommend that you:  Limit your fat intake to _________% or less of your total calories each day.  Limit your saturated fat intake to _________% or less of your total calories each day.  Limit the amount of cholesterol in your diet to less than _________ mg per day.  What are tips for following this plan?  Cooking  Cook foods using methods other than frying. Baking, boiling, grilling, and broiling are all good options. Other ways to reduce fat include:  Removing the skin from poultry.  Removing all visible fats from meats.  Steaming vegetables in water or broth.  Meal planning  A plate with examples of foods in a healthy diet.      At meals, imagine dividing your plate into fourths:  Fill one-half of your plate with vegetables and green salads.  Fill one-fourth of your plate with whole grains.  Fill one-fourth of your plate with lean protein foods.  Eat 4-5 servings of vegetables per day. One serving equals 1 cup raw or cooked vegetable, or 2 cups raw leafy greens.  Eat 4-5 servings of fruit per day. One serving equals 1 medium whole fruit, ¼ cup dried fruit, ½ cup fresh, frozen, or canned fruit, or ½ cup  100% fruit juice.  Eat more foods that contain soluble fiber. Examples include apples, broccoli, carrots, beans, peas, and barley. Aim to get 25-30 g of fiber per day.  Increase your consumption of legumes, nuts, and seeds to 4-5 servings per week. One serving of dried beans or legumes equals ½ cup cooked, 1 serving of nuts is ¼ cup, and 1 serving of seeds equals 1 tablespoon.  Fats  Choose healthy fats more often. Choose monounsaturated and polyunsaturated fats, such as olive and canola oils, flaxseeds, walnuts, almonds, and seeds.  Eat more omega-3 fats. Choose salmon, mackerel, sardines, tuna, flaxseed oil, and ground flaxseeds. Aim to eat fish at least 2 times each week.  Check food labels carefully to identify foods with trans fats or high amounts of saturated fat.  Limit saturated fats. These are found in animal products, such as meats, butter, and cream. Plant sources of saturated fats include palm oil, palm kernel oil, and coconut oil.  Avoid foods with partially hydrogenated oils in them. These contain trans fats. Examples are stick margarine, some tub margarines, cookies, crackers, and other baked goods.  Avoid fried foods.  General information  Eat more home-cooked food and less restaurant, buffet, and fast food.  Limit or avoid alcohol.  Limit foods that are high in starch and sugar.  Lose weight if you are overweight. Losing just 5-10% of your body weight can help your overall health and prevent diseases such as diabetes and heart disease.  Monitor your salt (sodium) intake, especially if you have high blood pressure. Talk with your health care provider about your sodium intake.  Try to incorporate more vegetarian meals weekly.  What foods can I eat?  Fruits  All fresh, canned (in natural juice), or frozen fruits.  Vegetables  Fresh or frozen vegetables (raw, steamed, roasted, or grilled). Green salads.  Grains  Most grains. Choose whole wheat and whole grains most of the time. Rice and pasta, including  brown rice and pastas made with whole wheat.  Meats and other proteins  Lean, well-trimmed beef, veal, pork, and lamb. Chicken and turkey without skin. All fish and shellfish. Wild duck, rabbit, pheasant, and venison. Egg whites or low-cholesterol egg substitutes. Dried beans, peas, lentils, and tofu. Seeds and most nuts.  Dairy  Low-fat or nonfat cheeses, including ricotta and mozzarella. Skim or 1% milk (liquid, powdered, or evaporated). Buttermilk made with low-fat milk. Nonfat or low-fat yogurt.  Fats and oils  Non-hydrogenated (trans-free) margarines. Vegetable oils, including soybean, sesame, sunflower, olive, peanut, safflower, corn, canola, and cottonseed. Salad dressings or mayonnaise made with a vegetable oil.  Beverages  Water (mineral or sparkling). Coffee and tea. Diet carbonated beverages.  Sweets and desserts  Sherbet, gelatin, and fruit ice. Small amounts of dark chocolate.  Limit all sweets and desserts.  Seasonings and condiments  All seasonings and condiments.  The items listed above may not be a complete list of foods and beverages you can eat. Contact a dietitian for more options.  What foods are not recommended?  Fruits  Canned fruit in heavy syrup. Fruit in cream or butter sauce. Fried fruit. Limit coconut.  Vegetables  Vegetables cooked in cheese, cream, or butter sauce. Fried vegetables.  Grains  Breads made with saturated or trans fats, oils, or whole milk. Croissants. Sweet rolls. Donuts. High-fat crackers, such as cheese crackers.  Meats and other proteins  Fatty meats, such as hot dogs, ribs, sausage, khanna, rib-eye roast or steak. High-fat deli meats, such as salami and bologna. Caviar. Domestic duck and goose. Organ meats, such as liver.  Dairy  Cream, sour cream, cream cheese, and creamed cottage cheese. Whole-milk cheeses. Whole or 2% milk (liquid, evaporated, or condensed). Whole buttermilk. Cream sauce or high-fat cheese sauce. Whole-milk yogurt.  Fats and oils  Meat fat, or  shortening. Cocoa butter, hydrogenated oils, palm oil, coconut oil, palm kernel oil. Solid fats and shortenings, including khanna fat, salt pork, lard, and butter. Nondairy cream substitutes. Salad dressings with cheese or sour cream.  Beverages  Regular sodas and any drinks with added sugar.  Sweets and desserts  Frosting. Pudding. Cookies. Cakes. Pies. Milk chocolate or white chocolate. Buttered syrups. Full-fat ice cream or ice cream drinks.  The items listed above may not be a complete list of foods and beverages to avoid. Contact a dietitian for more information.  Summary  Heart-healthy meal planning includes limiting unhealthy fats, increasing healthy fats, and making other diet and lifestyle changes.  Lose weight if you are overweight. Losing just 5-10% of your body weight can help your overall health and prevent diseases such as diabetes and heart disease.  Focus on eating a balance of foods, including fruits and vegetables, low-fat or nonfat dairy, lean protein, nuts and legumes, whole grains, and heart-healthy oils and fats.  This information is not intended to replace advice given to you by your health care provider. Make sure you discuss any questions you have with your health care provider.  Document Revised: 04/28/2022 Document Reviewed: 04/28/2022  [employer] Patient Education © 2022 [employer]

## 2023-12-18 DIAGNOSIS — I10 BENIGN ESSENTIAL HYPERTENSION: ICD-10-CM

## 2023-12-18 RX ORDER — LISINOPRIL AND HYDROCHLOROTHIAZIDE 20; 12.5 MG/1; MG/1
1 TABLET ORAL DAILY
Qty: 90 TABLET | Refills: 1 | Status: SHIPPED | OUTPATIENT
Start: 2023-12-18

## 2023-12-19 RX ORDER — LISINOPRIL AND HYDROCHLOROTHIAZIDE 12.5; 1 MG/1; MG/1
1 TABLET ORAL DAILY
Qty: 90 TABLET | Refills: 3 | OUTPATIENT
Start: 2023-12-19

## 2023-12-21 RX ORDER — FINASTERIDE 1 MG/1
TABLET, FILM COATED ORAL
Qty: 30 TABLET | Refills: 0 | Status: SHIPPED | OUTPATIENT
Start: 2023-12-21

## 2023-12-21 NOTE — TELEPHONE ENCOUNTER
The original prescription was discontinued on 5/19/2023 by Sherri Anna MD. Renewing this prescription may not be appropriate.

## 2023-12-27 ENCOUNTER — HOSPITAL ENCOUNTER (OUTPATIENT)
Dept: CT IMAGING | Facility: HOSPITAL | Age: 48
Discharge: HOME | End: 2023-12-27
Admitting: INTERNAL MEDICINE

## 2023-12-27 DIAGNOSIS — Z71.89 ENCOUNTER FOR CARDIAC RISK COUNSELING: ICD-10-CM

## 2023-12-27 DIAGNOSIS — E66.3 OVERWEIGHT WITH BODY MASS INDEX (BMI) OF 29 TO 29.9 IN ADULT: Chronic | ICD-10-CM

## 2023-12-27 DIAGNOSIS — E78.2 MIXED HYPERLIPIDEMIA: ICD-10-CM

## 2023-12-27 DIAGNOSIS — I10 BENIGN ESSENTIAL HYPERTENSION: ICD-10-CM

## 2023-12-27 PROCEDURE — 75571 CT HRT W/O DYE W/CA TEST: CPT

## 2024-01-17 DIAGNOSIS — I10 BENIGN ESSENTIAL HYPERTENSION: ICD-10-CM

## 2024-01-17 RX ORDER — LISINOPRIL AND HYDROCHLOROTHIAZIDE 12.5; 1 MG/1; MG/1
1 TABLET ORAL DAILY
Qty: 90 TABLET | Refills: 3 | OUTPATIENT
Start: 2024-01-17

## 2024-01-17 NOTE — TELEPHONE ENCOUNTER
Rx Refill Note  Requested Prescriptions     Pending Prescriptions Disp Refills    lisinopril-hydrochlorothiazide (PRINZIDE,ZESTORETIC) 10-12.5 MG per tablet [Pharmacy Med Name: LISINOPRIL-HCTZ 10-12.5 MG TAB] 90 tablet 3     Sig: TAKE ONE TABLET BY MOUTH DAILY      Last office visit with prescribing clinician: 11/20/2023   Last telemedicine visit with prescribing clinician: Visit date not found   Next office visit with prescribing clinician: 2/1/2024                         Would you like a call back once the refill request has been completed: [] Yes [] No    If the office needs to give you a call back, can they leave a voicemail: [] Yes [] No    Olesya Purvis LPN  01/17/24, 09:34 EST

## 2024-02-01 ENCOUNTER — OFFICE VISIT (OUTPATIENT)
Dept: INTERNAL MEDICINE | Facility: CLINIC | Age: 49
End: 2024-02-01
Payer: COMMERCIAL

## 2024-02-01 VITALS
DIASTOLIC BLOOD PRESSURE: 84 MMHG | TEMPERATURE: 98 F | HEIGHT: 73 IN | SYSTOLIC BLOOD PRESSURE: 132 MMHG | BODY MASS INDEX: 29.26 KG/M2 | HEART RATE: 74 BPM | WEIGHT: 220.8 LBS | OXYGEN SATURATION: 97 %

## 2024-02-01 DIAGNOSIS — H90.3 SENSORINEURAL HEARING LOSS (SNHL) OF BOTH EARS: ICD-10-CM

## 2024-02-01 DIAGNOSIS — E78.2 MIXED HYPERLIPIDEMIA: ICD-10-CM

## 2024-02-01 DIAGNOSIS — M25.562 ACUTE PAIN OF LEFT KNEE: Chronic | ICD-10-CM

## 2024-02-01 DIAGNOSIS — E66.3 OVERWEIGHT WITH BODY MASS INDEX (BMI) OF 29 TO 29.9 IN ADULT: Chronic | ICD-10-CM

## 2024-02-01 DIAGNOSIS — I10 BENIGN ESSENTIAL HYPERTENSION: Primary | ICD-10-CM

## 2024-02-01 PROCEDURE — 99213 OFFICE O/P EST LOW 20 MIN: CPT | Performed by: INTERNAL MEDICINE

## 2024-02-01 NOTE — PROGRESS NOTES
[facility]     Colin Cheema  1975   [id number]      Patient Care Team:  Sherri Anna MD as PCP - General (Internal Medicine)  Sherri Anna MD as PCP - Internal Medicine (Internal Medicine)    Chief Complaint   Patient presents with    Hearing Problem     Pt requested a referral to check hearing due to hearing loss he has noticed    Knee Pain     Left knee. Injured a while ago but it has improved            HPI  Patient is a 48 y.o. male presents with     The patient presents today for an office visit.    Overweight  The patient has lost 4 pounds since 11/2023. He is trying to exercise. The patient enjoys eating right. He has cut out soda from his diet.    Hypertension  The patient has not been checking his blood pressure at home. He notes that he feels improved. The patient's blood pressure is slightly elevated today, he attributes this to a lack of sleep and more stress at work. It took approximately 1 week to adjust to the new medication. He reported dizziness and headaches for a couple of days in a row. The patient feels he has adjusted well to the new medication. His general level of agitation and anxiety have resolved.    Knee pain  The patient injured his knee playing volleyball. He has been walking and using the Peloton. The patient does have mild swelling.    Hearing loss  The patient's wife has told him that his hearing is not as good as it used to be. He would like a referral to [facility].                  CHRONIC CONDITIONS      Past Medical History:   Diagnosis Date    Achilles bursitis or tendinitis 2/6/2019    Acute stress disorder 2/6/2019    Allergic     Allergic rhinitis     COVID-19 virus infection 11/18/2022    Memory problems after Covid infection in August. Now improved.    GERD (gastroesophageal reflux disease)     Hyperlipidemia     Hypertension     Low back pain     Numbness and tingling in left arm 12/29/2015    Piriformis  "syndrome, left 05/06/2016    Plantar fasciitis 02/12/2016    left foot    Vitamin B 12 deficiency        Past Surgical History:   Procedure Laterality Date    HERNIA REPAIR Bilateral 2001    Inguinal       Family History   Problem Relation Age of Onset    Testicular cancer Father     Coronary artery disease Maternal Grandfather     Colon cancer Other        Social History     Socioeconomic History    Marital status:    Tobacco Use    Smoking status: Never    Smokeless tobacco: Never   Substance and Sexual Activity    Alcohol use: Yes     Comment: socially       Allergies   Allergen Reactions    Loratadine Unknown (See Comments)     Edgy feeling per pt        Review of Systems:     Review of Systems    Vital Signs  Vitals:    02/01/24 1143   BP: 132/84   BP Location: Left arm   Patient Position: Sitting   Cuff Size: Adult   Pulse: 74   Temp: 98 °F (36.7 °C)   TempSrc: Infrared   SpO2: 97%   Weight: 100 kg (220 lb 12.8 oz)   Height: 185.4 cm (72.99\")   PainSc: 0-No pain     Body mass index is 29.14 kg/m².  BMI is >= 25 and <30. (Overweight) The following options were offered after discussion;: weight loss educational material (shared in after visit summary), exercise counseling/recommendations, nutrition counseling/recommendations, and Information on healthy weight added to patient's after visit summary.        Current Outpatient Medications:     finasteride (PROPECIA) 1 MG tablet, TAKE ONE TABLET BY MOUTH DAILY (Patient taking differently: Very rarely taken), Disp: 30 tablet, Rfl: 0    lisinopril-hydrochlorothiazide (PRINZIDE,ZESTORETIC) 20-12.5 MG per tablet, Take 1 tablet by mouth Daily., Disp: 90 tablet, Rfl: 1    vitamin B-12 (CYANOCOBALAMIN) 1000 MCG tablet, Take 1 tablet by mouth Daily. (Patient not taking: Reported on 2/1/2024), Disp: 90 tablet, Rfl: 1    Physical Exam:    Physical Exam  Vitals and nursing note reviewed.   Constitutional:       Appearance: He is well-developed.   Eyes:      " "Conjunctiva/sclera: Conjunctivae normal.      Pupils: Pupils are equal, round, and reactive to light.   Neck:      Thyroid: No thyromegaly.   Cardiovascular:      Rate and Rhythm: Normal rate and regular rhythm.      Heart sounds: Normal heart sounds. No murmur heard.  Pulmonary:      Effort: Pulmonary effort is normal.      Breath sounds: Normal breath sounds. No wheezing.   Abdominal:      General: Bowel sounds are normal. There is no distension.      Palpations: Abdomen is soft. There is no mass.      Tenderness: There is no abdominal tenderness.   Musculoskeletal:         General: No tenderness. Normal range of motion.      Cervical back: Normal range of motion and neck supple.      Right lower leg: No edema.      Left lower leg: No edema.   Lymphadenopathy:      Cervical: No cervical adenopathy.   Skin:     General: Skin is warm and dry.      Findings: No rash.   Neurological:      Mental Status: He is alert and oriented to person, place, and time.      Cranial Nerves: No cranial nerve deficit.      Sensory: No sensory deficit.      Coordination: Coordination normal.      Gait: Gait normal.   Psychiatric:         Speech: Speech normal.         Behavior: Behavior normal.         Thought Content: Thought content normal.         Judgment: Judgment normal.        ACE III MINI        Results Review:    None    CMP:  Lab Results   Component Value Date    BUN 18 11/15/2022    CREATININE 1.05 11/15/2022    EGFRIFNONA 81 07/27/2021    EGFRIFAFRI 99 07/27/2021    BCR 17.1 11/15/2022     11/15/2022    K 4.2 11/15/2022    CO2 28.1 11/15/2022    CALCIUM 9.4 11/15/2022    PROTENTOTREF 7.1 07/27/2021    ALBUMIN 4.60 11/15/2022    LABGLOBREF 2.4 07/27/2021    LABIL2 2.0 07/27/2021    BILITOT 0.7 11/15/2022    ALKPHOS 44 11/15/2022    AST 22 11/15/2022    ALT 28 11/15/2022     HbA1c:  No results found for: \"HGBA1C\"  Microalbumin:  Lab Results   Component Value Date    MICROALBUR 13.1 08/04/2020     Lipid Panel  Lab " Results   Component Value Date    CHOL 160 11/15/2022    TRIG 137 11/15/2022    HDL 39 (L) 11/15/2022    LDL 97 11/15/2022    AST 22 11/15/2022    ALT 28 11/15/2022       Medication Review: Medications reviewed and noted  Patient Instructions   Problem List Items Addressed This Visit          Cardiac and Vasculature    Mixed hyperlipidemia    Overview     Lifestyle control.         Current Assessment & Plan     Continue to improve low-fat, healthy diet, and get regular exercise.         Benign essential hypertension - Primary    Overview     Taking lisinopril/hydrochlorothiazide daily.            Current Assessment & Plan     Continue lisinopril-hydrochlorothiazide daily. Continue to avoid salt in the diet. Continue regular exercise.         Relevant Medications    lisinopril-hydrochlorothiazide (PRINZIDE,ZESTORETIC) 20-12.5 MG per tablet       ENT    Sensorineural hearing loss (SNHL) of both ears    Current Assessment & Plan     I gave him an order for hearing testing at [facility].            Musculoskeletal and Injuries    Acute pain of left knee (Chronic)    Current Assessment & Plan     He will continue regular exercise and using the bike. Use a cold pack on the knee as needed to decrease pain, inflammation, or swelling.            Other    Overweight with body mass index (BMI) of 29 to 29.9 in adult (Chronic)    Overview                Current Assessment & Plan     He has lost 4 pounds over the last 3 months. He will continue working on low-fat, healthy diet, and eating smaller portions at mealtime. Continue to avoid snacking. Continue regular exercise with the Peloton.               Diagnosis Plan   1. Benign essential hypertension        2. Mixed hyperlipidemia        3. Overweight with body mass index (BMI) of 29 to 29.9 in adult        4. Acute pain of left knee        5. Sensorineural hearing loss (SNHL) of both ears                Plan of care reviewed with patient at the  conclusion of today's visit. Education was provided regarding diagnosis, management, and any prescribed or recommended OTC medications.Patient verbalizes understanding of and agreement with management plan.         Sherri Anna MD    Transcribed from ambient dictation for Sherri Anna MD by Chika Beltran.  02/01/24   13:52 EST    Patient or patient representative verbalized consent to the visit recording.  I have personally performed the services described in this document as transcribed by the above individual, and it is both accurate and complete.

## 2024-02-01 NOTE — ASSESSMENT & PLAN NOTE
He has lost 4 pounds over the last 3 months. He will continue working on low-fat, healthy diet, and eating smaller portions at mealtime. Continue to avoid snacking. Continue regular exercise with the Peloton.

## 2024-02-01 NOTE — ASSESSMENT & PLAN NOTE
Continue lisinopril-hydrochlorothiazide daily. Continue to avoid salt in the diet. Continue regular exercise.

## 2024-02-01 NOTE — ASSESSMENT & PLAN NOTE
He will continue regular exercise and using the bike. Use a cold pack on the knee as needed to decrease pain, inflammation, or swelling.

## 2024-02-02 NOTE — PATIENT INSTRUCTIONS
Patient Instructions  Problem List Items Addressed This Visit          Cardiac and Vasculature    Mixed hyperlipidemia    Overview     Lifestyle control.         Current Assessment & Plan     Continue to improve low-fat, healthy diet, and get regular exercise.         Benign essential hypertension - Primary    Overview     Taking lisinopril/hydrochlorothiazide daily.            Current Assessment & Plan     Continue lisinopril-hydrochlorothiazide daily. Continue to avoid salt in the diet. Continue regular exercise.         Relevant Medications    lisinopril-hydrochlorothiazide (PRINZIDE,ZESTORETIC) 20-12.5 MG per tablet       ENT    Sensorineural hearing loss (SNHL) of both ears    Current Assessment & Plan     I gave him an order for hearing testing at [facility] [place].            Musculoskeletal and Injuries    Acute pain of left knee (Chronic)    Current Assessment & Plan     He will continue regular exercise and using the bike. Use a cold pack on the knee as needed to decrease pain, inflammation, or swelling.            Other    Overweight with body mass index (BMI) of 29 to 29.9 in adult (Chronic)    Overview                Current Assessment & Plan     He has lost 4 pounds over the last 3 months. He will continue working on low-fat, healthy diet, and eating smaller portions at mealtime. Continue to avoid snacking. Continue regular exercise with the [employer].            Exercising to Stay Healthy  To become healthy and stay healthy, it is recommended that you do moderate-intensity and vigorous-intensity exercise. You can tell that you are exercising at a moderate intensity if your heart starts beating faster and you start breathing faster but can still hold a conversation. You can tell that you are exercising at a vigorous intensity if you are breathing much harder and faster and cannot hold a conversation while exercising.  How can exercise benefit me?  Exercising regularly is important. It  has many health benefits, such as:  Improving overall fitness, flexibility, and endurance.  Increasing bone density.  Helping with weight control.  Decreasing body fat.  Increasing muscle strength and endurance.  Reducing stress and tension, anxiety, depression, or anger.  Improving overall health.  What guidelines should I follow while exercising?  Before you start a new exercise program, talk with your health care provider.  Do not exercise so much that you hurt yourself, feel dizzy, or get very short of breath.  Wear comfortable clothes and wear shoes with good support.  Drink plenty of water while you exercise to prevent dehydration or heat stroke.  Work out until your breathing and your heartbeat get faster (moderate intensity).  How often should I exercise?  Choose an activity that you enjoy, and set realistic goals. Your health care provider can help you make an activity plan that is individually designed and works best for you.  Exercise regularly as told by your health care provider. This may include:  Doing strength training two times a week, such as:  Lifting weights.  Using resistance bands.  Push-ups.  Sit-ups.  Yoga.  Doing a certain intensity of exercise for a given amount of time. Choose from these options:  A total of 150 minutes of moderate-intensity exercise every week.  A total of 75 minutes of vigorous-intensity exercise every week.  A mix of moderate-intensity and vigorous-intensity exercise every week.  Children, pregnant women, people who have not exercised regularly, people who are overweight, and older adults may need to talk with a health care provider about what activities are safe to perform. If you have a medical condition, be sure to talk with your health care provider before you start a new exercise program.  What are some exercise ideas?  Moderate-intensity exercise ideas include:  Walking 1 mile (1.6 km) in about 15 minutes.  Biking.  Hiking.  Golfing.  Dancing.  Water  aerobics.  Vigorous-intensity exercise ideas include:  Walking 4.5 miles (7.2 km) or more in about 1 hour.  Jogging or running 5 miles (8 km) in about 1 hour.  Biking 10 miles (16.1 km) or more in about 1 hour.  Lap swimming.  Roller-skating or in-line skating.  Cross-country skiing.  Vigorous competitive sports, such as football, basketball, and soccer.  Jumping rope.  Aerobic dancing.  What are some everyday activities that can help me get exercise?  Yard work, such as:  Pushing a .  Raking and bagging leaves.  Washing your car.  Pushing a stroller.  Shoveling snow.  Gardening.  Washing windows or floors.  How can I be more active in my day-to-day activities?  Use stairs instead of an elevator.  Take a walk during your lunch break.  If you drive, park your car farther away from your work or school.  If you take public transportation, get off one stop early and walk the rest of the way.  Stand up or walk around during all of your indoor phone calls.  Get up, stretch, and walk around every 30 minutes throughout the day.  Enjoy exercise with a friend. Support to continue exercising will help you keep a regular routine of activity.  Where to find more information  You can find more information about exercising to stay healthy from:  [place] Department of Health and Human Services: www.hhs.gov  Centers for Disease Control and Prevention (CDC): www.cdc.gov  Summary  Exercising regularly is important. It will improve your overall fitness, flexibility, and endurance.  Regular exercise will also improve your overall health. It can help you control your weight, reduce stress, and improve your bone density.  Do not exercise so much that you hurt yourself, feel dizzy, or get very short of breath.  Before you start a new exercise program, talk with your health care provider.  This information is not intended to replace advice given to you by your health care provider. Make sure you discuss any questions you have with  your health care provider.  Document Revised: 04/15/2022 Document Reviewed: 04/15/2022  Elsevier Patient Education © 2023 [employer] Heart-Healthy Eating Plan  Many factors influence your heart (coronary) health, including eating and exercise habits. Coronary risk increases with abnormal blood fat (lipid) levels. Heart-healthy meal planning includes limiting unhealthy fats, increasing healthy fats, and making other diet and lifestyle changes.  What is my plan?  Your health care provider may recommend that you:  Limit your fat intake to _________% or less of your total calories each day.  Limit your saturated fat intake to _________% or less of your total calories each day.  Limit the amount of cholesterol in your diet to less than _________ mg per day.  What are tips for following this plan?  Cooking  Cook foods using methods other than frying. Baking, boiling, grilling, and broiling are all good options. Other ways to reduce fat include:  Removing the skin from poultry.  Removing all visible fats from meats.  Steaming vegetables in water or broth.  Meal planning  A plate with examples of foods in a healthy diet.      At meals, imagine dividing your plate into fourths:  Fill one-half of your plate with vegetables and green salads.  Fill one-fourth of your plate with whole grains.  Fill one-fourth of your plate with lean protein foods.  Eat 4-5 servings of vegetables per day. One serving equals 1 cup raw or cooked vegetable, or 2 cups raw leafy greens.  Eat 4-5 servings of fruit per day. One serving equals 1 medium whole fruit, ¼ cup dried fruit, ½ cup fresh, frozen, or canned fruit, or ½ cup 100% fruit juice.  Eat more foods that contain soluble fiber. Examples include apples, broccoli, carrots, beans, peas, and barley. Aim to get 25-30 g of fiber per day.  Increase your consumption of legumes, nuts, and seeds to 4-5 servings per week. One serving of dried beans or legumes equals ½ cup cooked, 1 serving of nuts  is ¼ cup, and 1 serving of seeds equals 1 tablespoon.  Fats  Choose healthy fats more often. Choose monounsaturated and polyunsaturated fats, such as olive and canola oils, flaxseeds, walnuts, almonds, and seeds.  Eat more omega-3 fats. Choose salmon, mackerel, sardines, tuna, flaxseed oil, and ground flaxseeds. Aim to eat fish at least 2 times each week.  Check food labels carefully to identify foods with trans fats or high amounts of saturated fat.  Limit saturated fats. These are found in animal products, such as meats, butter, and cream. Plant sources of saturated fats include palm oil, palm kernel oil, and coconut oil.  Avoid foods with partially hydrogenated oils in them. These contain trans fats. Examples are stick margarine, some tub margarines, cookies, crackers, and other baked goods.  Avoid fried foods.  General information  Eat more home-cooked food and less restaurant, buffet, and fast food.  Limit or avoid alcohol.  Limit foods that are high in starch and sugar.  Lose weight if you are overweight. Losing just 5-10% of your body weight can help your overall health and prevent diseases such as diabetes and heart disease.  Monitor your salt (sodium) intake, especially if you have high blood pressure. Talk with your health care provider about your sodium intake.  Try to incorporate more vegetarian meals weekly.  What foods can I eat?  Fruits  All fresh, canned (in natural juice), or frozen fruits.  Vegetables  Fresh or frozen vegetables (raw, steamed, roasted, or grilled). Green salads.  Grains  Most grains. Choose whole wheat and whole grains most of the time. Rice and pasta, including brown rice and pastas made with whole wheat.  Meats and other proteins  Lean, well-trimmed beef, veal, pork, and lamb. Chicken and turkey without skin. All fish and shellfish. Wild duck, rabbit, pheasant, and venison. Egg whites or low-cholesterol egg substitutes. Dried beans, peas, lentils, and tofu. Seeds and most  nuts.  Dairy  Low-fat or nonfat cheeses, including ricotta and mozzarella. Skim or 1% milk (liquid, powdered, or evaporated). Buttermilk made with low-fat milk. Nonfat or low-fat yogurt.  Fats and oils  Non-hydrogenated (trans-free) margarines. Vegetable oils, including soybean, sesame, sunflower, olive, peanut, safflower, corn, canola, and cottonseed. Salad dressings or mayonnaise made with a vegetable oil.  Beverages  Water (mineral or sparkling). Coffee and tea. Diet carbonated beverages.  Sweets and desserts  Sherbet, gelatin, and fruit ice. Small amounts of dark chocolate.  Limit all sweets and desserts.  Seasonings and condiments  All seasonings and condiments.  The items listed above may not be a complete list of foods and beverages you can eat. Contact a dietitian for more options.  What foods are not recommended?  Fruits  Canned fruit in heavy syrup. Fruit in cream or butter sauce. Fried fruit. Limit coconut.  Vegetables  Vegetables cooked in cheese, cream, or butter sauce. Fried vegetables.  Grains  Breads made with saturated or trans fats, oils, or whole milk. Croissants. Sweet rolls. Donuts. High-fat crackers, such as cheese crackers.  Meats and other proteins  Fatty meats, such as hot dogs, ribs, sausage, khanna, rib-eye roast or steak. High-fat deli meats, such as salami and bologna. Caviar. Domestic duck and goose. Organ meats, such as liver.  Dairy  Cream, sour cream, cream cheese, and creamed cottage cheese. Whole-milk cheeses. Whole or 2% milk (liquid, evaporated, or condensed). Whole buttermilk. Cream sauce or high-fat cheese sauce. Whole-milk yogurt.  Fats and oils  Meat fat, or shortening. Cocoa butter, hydrogenated oils, palm oil, coconut oil, palm kernel oil. Solid fats and shortenings, including khanna fat, salt pork, lard, and butter. Nondairy cream substitutes. Salad dressings with cheese or sour cream.  Beverages  Regular sodas and any drinks with added sugar.  Sweets and desserts  Frosting.  Pudding. Cookies. Cakes. Pies. Milk chocolate or white chocolate. Buttered syrups. Full-fat ice cream or ice cream drinks.  The items listed above may not be a complete list of foods and beverages to avoid. Contact a dietitian for more information.  Summary  Heart-healthy meal planning includes limiting unhealthy fats, increasing healthy fats, and making other diet and lifestyle changes.  Lose weight if you are overweight. Losing just 5-10% of your body weight can help your overall health and prevent diseases such as diabetes and heart disease.  Focus on eating a balance of foods, including fruits and vegetables, low-fat or nonfat dairy, lean protein, nuts and legumes, whole grains, and heart-healthy oils and fats.  This information is not intended to replace advice given to you by your health care provider. Make sure you discuss any questions you have with your health care provider.  Document Revised: 04/28/2022 Document Reviewed: 04/28/2022  [employer] Patient Education © 2022 [employer]    BMI for Adults  Body mass index (BMI) is a number found using a person's weight and height. BMI can help tell how much of a person's weight is made up of fat. BMI does not measure body fat directly. It is used instead of tests that directly measure body fat, which can be difficult and expensive.  What are BMI measurements used for?  BMI is useful to:  Find out if your weight puts you at higher risk for medical problems.  Help recommend changes, such as in diet and exercise. This can help you reach a healthy weight. BMI screening can be done again to see if these changes are working.  How is BMI calculated?  Your height and weight are measured. The BMI is found from those numbers. This can be done with [place] or metric measurements. Note that charts and online BMI calculators are available to help you find your BMI quickly and easily without doing these calculations.  To calculate your BMI in [place] measurements:  Measure your  "weight in pounds (lb).  Multiply the number of pounds by 703.  So, for an adult who weighs 150 lb, multiply that number by 703: 150 x 703, which equals 105,450.  Measure your height in inches. Then multiply that number by itself to get a measurement called \"inches squared.\"  So, for an adult who is 70 inches tall, the \"inches squared\" measurement is 70 inches x 70 inches, which equals 4,900 inches squared.  Divide the total from step 2 (number of lb x 703) by the total from step 3 (inches squared): 105,450 ÷ 4,900 = 21.5. This is your BMI.  To calculate your BMI in metric measurements:    Measure your weight in kilograms (kg).  For this example, the weight is 70 kg.  Measure your height in meters (m). Then multiply that number by itself to get a measurement called \"meters squared.\"  So, for an adult who is 1.75 m tall, the \"meters squared\" measurement is 1.75 m x 1.75 m, which equals 3.1 meters squared.  Divide the number of kilograms (your weight) by the meters squared number. In this example: 70 ÷ 3.1 = 22.6. This is your BMI.  What do the results mean?  BMI charts are used to see if you are underweight, normal weight, overweight, or obese. The following guidelines will be used:  Underweight: BMI less than 18.5.  Normal weight: BMI between 18.5 and 24.9.  Overweight: BMI between 25 and 29.9.  Obese: BMI of 30 or above.  BMI is a tool and cannot diagnose a condition. Talk with your health care provider about what your BMI means for you. Keep these notes in mind:  Weight includes fat and muscle. Someone with a muscular build, such as an athlete, may have a BMI that is higher than 24.9. In cases like these, BMI is not a correct measure of body fat.  If you have a BMI of 25 or higher, your provider may need to do more testing to find out if excess body fat is the cause.  BMI is measured the same way for males and females. Females usually have more body fat than males of the same height and weight.  Where to find more " information  For more information about BMI, including tools to quickly find your BMI, go to:  Centers for Disease Control and Prevention: cdc.gov  American Heart Association: heart.org  National Heart, Lung, and Blood [place]: nhlbi.nih.gov  This information is not intended to replace advice given to you by your health care provider. Make sure you discuss any questions you have with your health care provider.  Document Revised: 09/07/2023 Document Reviewed: 08/31/2023  [employer]vier Patient Education © 2023 [employer]

## 2024-03-05 ENCOUNTER — LAB (OUTPATIENT)
Dept: LAB | Facility: HOSPITAL | Age: 49
End: 2024-03-05
Payer: COMMERCIAL

## 2024-03-05 DIAGNOSIS — I10 BENIGN ESSENTIAL HYPERTENSION: ICD-10-CM

## 2024-03-05 DIAGNOSIS — E78.2 MIXED HYPERLIPIDEMIA: ICD-10-CM

## 2024-03-05 DIAGNOSIS — E66.3 OVERWEIGHT WITH BODY MASS INDEX (BMI) OF 29 TO 29.9 IN ADULT: Chronic | ICD-10-CM

## 2024-03-05 DIAGNOSIS — E53.8 B12 DEFICIENCY: ICD-10-CM

## 2024-03-05 LAB
ALBUMIN SERPL-MCNC: 4.7 G/DL (ref 3.5–5.2)
ALBUMIN UR-MCNC: <1.2 MG/DL
ALBUMIN/GLOB SERPL: 2 G/DL
ALP SERPL-CCNC: 40 U/L (ref 39–117)
ALT SERPL W P-5'-P-CCNC: 28 U/L (ref 1–41)
ANION GAP SERPL CALCULATED.3IONS-SCNC: 13.2 MMOL/L (ref 5–15)
AST SERPL-CCNC: 23 U/L (ref 1–40)
BACTERIA UR QL AUTO: NORMAL /HPF
BASOPHILS # BLD AUTO: 0.03 10*3/MM3 (ref 0–0.2)
BASOPHILS NFR BLD AUTO: 0.5 % (ref 0–1.5)
BILIRUB SERPL-MCNC: 0.8 MG/DL (ref 0–1.2)
BILIRUB UR QL STRIP: NEGATIVE
BUN SERPL-MCNC: 19 MG/DL (ref 6–20)
BUN/CREAT SERPL: 21.6 (ref 7–25)
CALCIUM SPEC-SCNC: 9.4 MG/DL (ref 8.6–10.5)
CHLORIDE SERPL-SCNC: 100 MMOL/L (ref 98–107)
CHOLEST SERPL-MCNC: 162 MG/DL (ref 0–200)
CLARITY UR: CLEAR
CO2 SERPL-SCNC: 25.8 MMOL/L (ref 22–29)
COLOR UR: YELLOW
CREAT SERPL-MCNC: 0.88 MG/DL (ref 0.76–1.27)
CREAT UR-MCNC: 44.5 MG/DL
CRP SERPL-MCNC: 0.35 MG/DL (ref 0.01–0.5)
DEPRECATED RDW RBC AUTO: 39.7 FL (ref 37–54)
EGFRCR SERPLBLD CKD-EPI 2021: 106.1 ML/MIN/1.73
EOSINOPHIL # BLD AUTO: 0.15 10*3/MM3 (ref 0–0.4)
EOSINOPHIL NFR BLD AUTO: 2.6 % (ref 0.3–6.2)
ERYTHROCYTE [DISTWIDTH] IN BLOOD BY AUTOMATED COUNT: 12.5 % (ref 12.3–15.4)
GLOBULIN UR ELPH-MCNC: 2.4 GM/DL
GLUCOSE SERPL-MCNC: 98 MG/DL (ref 65–99)
GLUCOSE UR STRIP-MCNC: NEGATIVE MG/DL
HBA1C MFR BLD: 5.6 % (ref 4.8–5.6)
HCT VFR BLD AUTO: 46.7 % (ref 37.5–51)
HCYS SERPL-MCNC: 12.2 UMOL/L (ref 0–15)
HDLC SERPL-MCNC: 39 MG/DL (ref 40–60)
HGB BLD-MCNC: 15.9 G/DL (ref 13–17.7)
HGB UR QL STRIP.AUTO: NEGATIVE
HYALINE CASTS UR QL AUTO: NORMAL /LPF
IMM GRANULOCYTES # BLD AUTO: 0.01 10*3/MM3 (ref 0–0.05)
IMM GRANULOCYTES NFR BLD AUTO: 0.2 % (ref 0–0.5)
KETONES UR QL STRIP: NEGATIVE
LDLC SERPL CALC-MCNC: 102 MG/DL (ref 0–100)
LDLC/HDLC SERPL: 2.56 {RATIO}
LEUKOCYTE ESTERASE UR QL STRIP.AUTO: NEGATIVE
LYMPHOCYTES # BLD AUTO: 1.4 10*3/MM3 (ref 0.7–3.1)
LYMPHOCYTES NFR BLD AUTO: 24.3 % (ref 19.6–45.3)
MCH RBC QN AUTO: 29.8 PG (ref 26.6–33)
MCHC RBC AUTO-ENTMCNC: 34 G/DL (ref 31.5–35.7)
MCV RBC AUTO: 87.5 FL (ref 79–97)
MICROALBUMIN/CREAT UR: NORMAL MG/G{CREAT}
MONOCYTES # BLD AUTO: 0.45 10*3/MM3 (ref 0.1–0.9)
MONOCYTES NFR BLD AUTO: 7.8 % (ref 5–12)
NEUTROPHILS NFR BLD AUTO: 3.73 10*3/MM3 (ref 1.7–7)
NEUTROPHILS NFR BLD AUTO: 64.6 % (ref 42.7–76)
NITRITE UR QL STRIP: NEGATIVE
NRBC BLD AUTO-RTO: 0 /100 WBC (ref 0–0.2)
PH UR STRIP.AUTO: 6.5 [PH] (ref 5–8)
PLATELET # BLD AUTO: 295 10*3/MM3 (ref 140–450)
PMV BLD AUTO: 9.7 FL (ref 6–12)
POTASSIUM SERPL-SCNC: 4 MMOL/L (ref 3.5–5.2)
PROT SERPL-MCNC: 7.1 G/DL (ref 6–8.5)
PROT UR QL STRIP: NEGATIVE
RBC # BLD AUTO: 5.34 10*6/MM3 (ref 4.14–5.8)
RBC # UR STRIP: NORMAL /HPF
REF LAB TEST METHOD: NORMAL
SODIUM SERPL-SCNC: 139 MMOL/L (ref 136–145)
SP GR UR STRIP: 1.01 (ref 1–1.03)
SQUAMOUS #/AREA URNS HPF: NORMAL /HPF
TRIGL SERPL-MCNC: 115 MG/DL (ref 0–150)
TSH SERPL DL<=0.05 MIU/L-ACNC: 1.09 UIU/ML (ref 0.27–4.2)
UROBILINOGEN UR QL STRIP: NORMAL
VIT B12 BLD-MCNC: 339 PG/ML (ref 211–946)
VLDLC SERPL-MCNC: 21 MG/DL (ref 5–40)
WBC # UR STRIP: NORMAL /HPF
WBC NRBC COR # BLD AUTO: 5.77 10*3/MM3 (ref 3.4–10.8)

## 2024-03-05 PROCEDURE — 82043 UR ALBUMIN QUANTITATIVE: CPT

## 2024-03-05 PROCEDURE — 84443 ASSAY THYROID STIM HORMONE: CPT

## 2024-03-05 PROCEDURE — 80061 LIPID PANEL: CPT

## 2024-03-05 PROCEDURE — 83036 HEMOGLOBIN GLYCOSYLATED A1C: CPT

## 2024-03-05 PROCEDURE — 83090 ASSAY OF HOMOCYSTEINE: CPT

## 2024-03-05 PROCEDURE — 85025 COMPLETE CBC W/AUTO DIFF WBC: CPT

## 2024-03-05 PROCEDURE — 82607 VITAMIN B-12: CPT

## 2024-03-05 PROCEDURE — 80053 COMPREHEN METABOLIC PANEL: CPT

## 2024-03-05 PROCEDURE — 82570 ASSAY OF URINE CREATININE: CPT

## 2024-03-05 PROCEDURE — 86141 C-REACTIVE PROTEIN HS: CPT

## 2024-03-05 PROCEDURE — 81001 URINALYSIS AUTO W/SCOPE: CPT

## 2024-04-15 ENCOUNTER — OFFICE VISIT (OUTPATIENT)
Dept: INTERNAL MEDICINE | Facility: CLINIC | Age: 49
End: 2024-04-15
Payer: COMMERCIAL

## 2024-04-15 VITALS
WEIGHT: 219 LBS | HEART RATE: 68 BPM | TEMPERATURE: 98 F | OXYGEN SATURATION: 97 % | BODY MASS INDEX: 29.03 KG/M2 | SYSTOLIC BLOOD PRESSURE: 132 MMHG | HEIGHT: 73 IN | DIASTOLIC BLOOD PRESSURE: 70 MMHG

## 2024-04-15 DIAGNOSIS — I10 BENIGN ESSENTIAL HYPERTENSION: Primary | ICD-10-CM

## 2024-04-15 DIAGNOSIS — D17.1 LIPOMA OF TORSO: ICD-10-CM

## 2024-04-15 DIAGNOSIS — G89.29 CHRONIC LEFT-SIDED LOW BACK PAIN WITH LEFT-SIDED SCIATICA: ICD-10-CM

## 2024-04-15 DIAGNOSIS — M54.42 CHRONIC LEFT-SIDED LOW BACK PAIN WITH LEFT-SIDED SCIATICA: ICD-10-CM

## 2024-04-15 DIAGNOSIS — N50.89 TESTICULAR SWELLING, LEFT: ICD-10-CM

## 2024-04-15 DIAGNOSIS — N50.812 PAIN IN LEFT TESTICLE: ICD-10-CM

## 2024-04-15 PROCEDURE — 99214 OFFICE O/P EST MOD 30 MIN: CPT | Performed by: INTERNAL MEDICINE

## 2024-04-15 NOTE — PROGRESS NOTES
[facility]     Colin Cheema  1975   [id number]      Patient Care Team:  Sherri Anna MD as PCP - General (Internal Medicine)  Sherri Anna MD as PCP - Internal Medicine (Internal Medicine)    Chief Complaint   Patient presents with    Cyst on Back     Chiropractor noticed it at a recent appointment 2 weeks ago. Pt feels a bump on back    Back Pain     In area where the cyst is pt states. Pain is intermittent and come more often lately        Patient is a 48 y.o. male.   History of Present Illness  The patient is here for an office visit.    The patient reports experiencing increased sciatica-like symptoms on left with low back pain, prompting a consultation with Dr. Trujillo. He also experienced pain localized in the mid-upper back, which Dr. Trujillo attributed to a cyst or lipoma. The pain, which he describes as nerve pain, is exacerbated by leaning forward and lifting heavy objects. The pain typically subsides after a few days, but becomes irritated after a day. The patient also reports muscle tightness in the area when using the [employer] bike. Despite cessation of exercise for 2 weeks, the pain recurred upon resuming exercise. An x-ray performed by Dr. Trujillo did not reveal any spinal abnormalities per the patient. The patient has attempted to alleviate the pain with heat and cold therapy.    The left sciatica pain is improving.  ultrasound treatment was performed by Dr. Trujillo, which provided some relief. He sustained a piriformis injury a few years ago while running a half marathon.      Approximately 3 weeks ago, the patient experienced swelling in his left testicle, the size of a baseball. The swelling resolved after 3 days with an ice pack application. The pain was severe enough to cause discomfort when sitting. The swelling has since subsided, but he continues to experience tenderness, particularly at the base, which radiates into his abdomen. The patient speculates  "that sitting on hard plastic bleachers for 3 days during a 3-day swim meet may have irritated his left testicle. He has been wearing more supportive underwear.    Supplemental Information  His blood pressure is good at 132/70. He has lost about 6 pounds and is trying to eat less.     His father had testicular cancer.         CHRONIC CONDITIONS      Past Medical History:   Diagnosis Date    Achilles bursitis or tendinitis 2/6/2019    Acute stress disorder 2/6/2019    Allergic     Allergic rhinitis     COVID-19 virus infection 11/18/2022    Memory problems after Covid infection in August. Now improved.    GERD (gastroesophageal reflux disease)     Hyperlipidemia     Hypertension     Low back pain     Numbness and tingling in left arm 12/29/2015    Piriformis syndrome, left 05/06/2016    Plantar fasciitis 02/12/2016    left foot    Vitamin B 12 deficiency        Past Surgical History:   Procedure Laterality Date    HERNIA REPAIR Bilateral 2001    Inguinal       Family History   Problem Relation Age of Onset    Testicular cancer Father     Coronary artery disease Maternal Grandfather     Colon cancer Other        Social History     Socioeconomic History    Marital status:    Tobacco Use    Smoking status: Never    Smokeless tobacco: Never   Substance and Sexual Activity    Alcohol use: Yes     Comment: socially       Allergies   Allergen Reactions    Loratadine Unknown (See Comments)     Edgy feeling per pt        Review of Systems:     Review of Systems    Vital Signs  Vitals:    04/15/24 1243   BP: 132/70   BP Location: Left arm   Patient Position: Sitting   Cuff Size: Adult   Pulse: 68   Temp: 98 °F (36.7 °C)   TempSrc: Infrared   SpO2: 97%   Weight: 99.3 kg (219 lb)   Height: 185.4 cm (72.99\")   PainSc: Comment: Pain in back is intermittent, no pain today     Body mass index is 28.9 kg/m².  BMI is >= 25 and <30. (Overweight) The following options were offered after discussion;: weight loss educational " material (shared in after visit summary), exercise counseling/recommendations, nutrition counseling/recommendations, and Information on healthy weight added to patient's after visit summary.          Current Outpatient Medications:     lisinopril-hydrochlorothiazide (PRINZIDE,ZESTORETIC) 20-12.5 MG per tablet, Take 1 tablet by mouth Daily., Disp: 90 tablet, Rfl: 1    Physical Exam:    Physical Exam  Vitals and nursing note reviewed.   Constitutional:       Appearance: He is well-developed.   HENT:      Head: Normocephalic.   Eyes:      Conjunctiva/sclera: Conjunctivae normal.      Pupils: Pupils are equal, round, and reactive to light.   Neck:      Thyroid: No thyromegaly.   Cardiovascular:      Rate and Rhythm: Normal rate and regular rhythm.      Heart sounds: Normal heart sounds.   Pulmonary:      Effort: Pulmonary effort is normal.      Breath sounds: Normal breath sounds. No wheezing.   Genitourinary:         Comments: Mild testicular swelling remains. Nodule in left posterior testicle is very tender.  Measures about 1-1/2 to 2 cm.  Firm and knobby round shape.  Possibly swelling of the epididymis.  No redness of the testicle.  Musculoskeletal:         General: Normal range of motion.      Cervical back: Normal range of motion and neck supple.   Lymphadenopathy:      Cervical: No cervical adenopathy.   Skin:     General: Skin is warm and dry.             Comments:  Lipoma soft and round, measures approximately 2 cm in size.  It is soft and mobile   Neurological:      Mental Status: He is alert and oriented to person, place, and time.   Psychiatric:         Thought Content: Thought content normal.          ACE III MINI        Results Review:    I reviewed the patient's new clinical results.  Results  Laboratory Studies  A1c was 5.6. Average blood sugar is about 111.       CMP:  Lab Results   Component Value Date    BUN 19 03/05/2024    CREATININE 0.88 03/05/2024    EGFRIFNONA 81 07/27/2021    EGFRIFAFRI 99  07/27/2021    BCR 21.6 03/05/2024     03/05/2024    K 4.0 03/05/2024    CO2 25.8 03/05/2024    CALCIUM 9.4 03/05/2024    PROTENTOTREF 7.1 07/27/2021    ALBUMIN 4.7 03/05/2024    LABGLOBREF 2.4 07/27/2021    LABIL2 2.0 07/27/2021    BILITOT 0.8 03/05/2024    ALKPHOS 40 03/05/2024    AST 23 03/05/2024    ALT 28 03/05/2024     HbA1c:  Lab Results   Component Value Date    HGBA1C 5.60 03/05/2024     Microalbumin:  Lab Results   Component Value Date    MICROALBUR <1.2 03/05/2024     Lipid Panel  Lab Results   Component Value Date    CHOL 162 03/05/2024    TRIG 115 03/05/2024    HDL 39 (L) 03/05/2024     (H) 03/05/2024    AST 23 03/05/2024    ALT 28 03/05/2024       Medication Review: Medications reviewed and noted  Patient Instructions   Problem List Items Addressed This Visit          Cardiac and Vasculature    Benign essential hypertension - Primary    Overview     Taking lisinopril/hydrochlorothiazide daily.            Relevant Medications    lisinopril-hydrochlorothiazide (PRINZIDE,ZESTORETIC) 20-12.5 MG per tablet       Genitourinary and Reproductive     Testicular swelling, left    Overview     R testicle was swollen for a few days then the left one.  He used a cold pack and the symptoms resolved.  He did not see any redness or feel nodules.  He does have a small inguinal hernia recurrent on the left.         Relevant Orders    US Scrotum & Testicles    Pain in left testicle    Relevant Orders    US Scrotum & Testicles       Hematology and Neoplasia    Lipoma of torso    Relevant Orders    Ambulatory Referral to General Surgery (Completed)       Musculoskeletal and Injuries    Left-sided low back pain with sciatica          Diagnosis Plan   1. Benign essential hypertension        2. Lipoma of torso  Ambulatory Referral to General Surgery      3. Testicular swelling, left  US Scrotum & Testicles      4. Pain in left testicle  US Scrotum & Testicles      5. Chronic left-sided low back pain with  left-sided sciatica          Assessment & Plan  1. Upper back lipoma.  The lipoma, which is soft and round, measures approximately 2 cm in size.  It is soft and mobile. It is plausible that the patient's pain during exercise may be more muscular in origin. A referral will be made for the patient to consult with a general surgeon for potential removal of the lipoma.    2. Left testicular swelling and pain  Mild testicular swelling remains. Nodule in left posterior testicle is very tender.  Measures about 1-1/2 to 2 cm.  Firm and knobby round shape.  Possibly swelling of the epididymis.  No redness of the testicle.  An ultrasound will be ordered to further investigate the swelling.    Hypertension  He will continue lisinopril hydrochlorothiazide tablet daily.  Continue to avoid salt in the diet.    Chronic left-sided low back pain with left-sided sciatica   He will continue doing stretches  For the low back and piriformis area every morning and then do some more stretches later in the day.  Walk some daily.  Avoid sitting for long periods.         Plan of care reviewed with patient at the conclusion of today's visit. Education was provided regarding diagnosis, management, and any prescribed or recommended OTC medications. Patient verbalizes understanding of and agreement with management plan.         04/15/24   12:47 EDT    Patient or patient representative verbalized consent for the use of Ambient Listening during the visit with  Sherri Anna MD for chart documentation. 4/17/2024  13:02 EDT

## 2024-04-17 PROBLEM — D17.1 LIPOMA OF TORSO: Status: ACTIVE | Noted: 2024-04-17

## 2024-04-17 PROBLEM — N50.812 PAIN IN LEFT TESTICLE: Status: ACTIVE | Noted: 2024-04-17

## 2024-04-17 NOTE — PATIENT INSTRUCTIONS
Patient Instructions  Problem List Items Addressed This Visit          Cardiac and Vasculature    Benign essential hypertension - Primary    Overview     Taking lisinopril/hydrochlorothiazide daily.            Relevant Medications    lisinopril-hydrochlorothiazide (PRINZIDE,ZESTORETIC) 20-12.5 MG per tablet       Genitourinary and Reproductive     Testicular swelling, left    Overview     R testicle was swollen for a few days then the left one.  He used a cold pack and the symptoms resolved.  He did not see any redness or feel nodules.  He does have a small inguinal hernia recurrent on the left.         Relevant Orders    US Scrotum & Testicles    Pain in left testicle    Relevant Orders    US Scrotum & Testicles       Hematology and Neoplasia    Lipoma of torso    Relevant Orders    Ambulatory Referral to General Surgery (Completed)       Musculoskeletal and Injuries    Left-sided low back pain with sciatica       BMI for Adults  Body mass index (BMI) is a number found using a person's weight and height. BMI can help tell how much of a person's weight is made up of fat. BMI does not measure body fat directly. It is used instead of tests that directly measure body fat, which can be difficult and expensive.  What are BMI measurements used for?  BMI is useful to:  Find out if your weight puts you at higher risk for medical problems.  Help recommend changes, such as in diet and exercise. This can help you reach a healthy weight. BMI screening can be done again to see if these changes are working.  How is BMI calculated?  Your height and weight are measured. The BMI is found from those numbers. This can be done with [place] or metric measurements. Note that charts and online BMI calculators are available to help you find your BMI quickly and easily without doing these calculations.  To calculate your BMI in [place] measurements:  Measure your weight in pounds (lb).  Multiply the number of pounds by 703.  So, for an adult who  "weighs 150 lb, multiply that number by 703: 150 x 703, which equals 105,450.  Measure your height in inches. Then multiply that number by itself to get a measurement called \"inches squared.\"  So, for an adult who is 70 inches tall, the \"inches squared\" measurement is 70 inches x 70 inches, which equals 4,900 inches squared.  Divide the total from step 2 (number of lb x 703) by the total from step 3 (inches squared): 105,450 ÷ 4,900 = 21.5. This is your BMI.  To calculate your BMI in metric measurements:    Measure your weight in kilograms (kg).  For this example, the weight is 70 kg.  Measure your height in meters (m). Then multiply that number by itself to get a measurement called \"meters squared.\"  So, for an adult who is 1.75 m tall, the \"meters squared\" measurement is 1.75 m x 1.75 m, which equals 3.1 meters squared.  Divide the number of kilograms (your weight) by the meters squared number. In this example: 70 ÷ 3.1 = 22.6. This is your BMI.  What do the results mean?  BMI charts are used to see if you are underweight, normal weight, overweight, or obese. The following guidelines will be used:  Underweight: BMI less than 18.5.  Normal weight: BMI between 18.5 and 24.9.  Overweight: BMI between 25 and 29.9.  Obese: BMI of 30 or above.  BMI is a tool and cannot diagnose a condition. Talk with your health care provider about what your BMI means for you. Keep these notes in mind:  Weight includes fat and muscle. Someone with a muscular build, such as an athlete, may have a BMI that is higher than 24.9. In cases like these, BMI is not a correct measure of body fat.  If you have a BMI of 25 or higher, your provider may need to do more testing to find out if excess body fat is the cause.  BMI is measured the same way for males and females. Females usually have more body fat than males of the same height and weight.  Where to find more information  For more information about BMI, including tools to quickly find your " BMI, go to:  Centers for Disease Control and Prevention: cdc.gov  American Heart Association: heart.org  National Heart, Lung, and Blood [place]: nhlbi.nih.gov  This information is not intended to replace advice given to you by your health care provider. Make sure you discuss any questions you have with your health care provider.  Document Revised: 09/07/2023 Document Reviewed: 08/31/2023  [employer] Patient Education © 2023 [employer]  Exercising to Stay Healthy  To become healthy and stay healthy, it is recommended that you do moderate-intensity and vigorous-intensity exercise. You can tell that you are exercising at a moderate intensity if your heart starts beating faster and you start breathing faster but can still hold a conversation. You can tell that you are exercising at a vigorous intensity if you are breathing much harder and faster and cannot hold a conversation while exercising.  How can exercise benefit me?  Exercising regularly is important. It has many health benefits, such as:  Improving overall fitness, flexibility, and endurance.  Increasing bone density.  Helping with weight control.  Decreasing body fat.  Increasing muscle strength and endurance.  Reducing stress and tension, anxiety, depression, or anger.  Improving overall health.  What guidelines should I follow while exercising?  Before you start a new exercise program, talk with your health care provider.  Do not exercise so much that you hurt yourself, feel dizzy, or get very short of breath.  Wear comfortable clothes and wear shoes with good support.  Drink plenty of water while you exercise to prevent dehydration or heat stroke.  Work out until your breathing and your heartbeat get faster (moderate intensity).  How often should I exercise?  Choose an activity that you enjoy, and set realistic goals. Your health care provider can help you make an activity plan that is individually designed and works best for you.  Exercise regularly as  told by your health care provider. This may include:  Doing strength training two times a week, such as:  Lifting weights.  Using resistance bands.  Push-ups.  Sit-ups.  Yoga.  Doing a certain intensity of exercise for a given amount of time. Choose from these options:  A total of 150 minutes of moderate-intensity exercise every week.  A total of 75 minutes of vigorous-intensity exercise every week.  A mix of moderate-intensity and vigorous-intensity exercise every week.  Children, pregnant women, people who have not exercised regularly, people who are overweight, and older adults may need to talk with a health care provider about what activities are safe to perform. If you have a medical condition, be sure to talk with your health care provider before you start a new exercise program.  What are some exercise ideas?  Moderate-intensity exercise ideas include:  Walking 1 mile (1.6 km) in about 15 minutes.  Biking.  Hiking.  Golfing.  Dancing.  Water aerobics.  Vigorous-intensity exercise ideas include:  Walking 4.5 miles (7.2 km) or more in about 1 hour.  Jogging or running 5 miles (8 km) in about 1 hour.  Biking 10 miles (16.1 km) or more in about 1 hour.  Lap swimming.  Roller-skating or in-line skating.  Cross-country skiing.  Vigorous competitive sports, such as football, basketball, and soccer.  Jumping rope.  Aerobic dancing.  What are some everyday activities that can help me get exercise?  Yard work, such as:  Pushing a .  Raking and bagging leaves.  Washing your car.  Pushing a stroller.  Shoveling snow.  Gardening.  Washing windows or floors.  How can I be more active in my day-to-day activities?  Use stairs instead of an elevator.  Take a walk during your lunch break.  If you drive, park your car farther away from your work or school.  If you take public transportation, get off one stop early and walk the rest of the way.  Stand up or walk around during all of your indoor phone calls.  Get up,  stretch, and walk around every 30 minutes throughout the day.  Enjoy exercise with a friend. Support to continue exercising will help you keep a regular routine of activity.  Where to find more information  You can find more information about exercising to stay healthy from:  [place] Department of Health and Human Services: www.hhs.gov  Centers for Disease Control and Prevention (CDC): www.cdc.gov  Summary  Exercising regularly is important. It will improve your overall fitness, flexibility, and endurance.  Regular exercise will also improve your overall health. It can help you control your weight, reduce stress, and improve your bone density.  Do not exercise so much that you hurt yourself, feel dizzy, or get very short of breath.  Before you start a new exercise program, talk with your health care provider.  This information is not intended to replace advice given to you by your health care provider. Make sure you discuss any questions you have with your health care provider.  Document Revised: 04/15/2022 Document Reviewed: 04/15/2022  [employer] Patient Education © 2023 [employer]

## 2024-04-22 ENCOUNTER — HOSPITAL ENCOUNTER (OUTPATIENT)
Dept: ULTRASOUND IMAGING | Facility: HOSPITAL | Age: 49
Discharge: HOME | End: 2024-04-22
Admitting: INTERNAL MEDICINE
Payer: COMMERCIAL

## 2024-04-22 DIAGNOSIS — N43.40 SPERMATOCELE OF EPIDIDYMIS: ICD-10-CM

## 2024-04-22 DIAGNOSIS — N50.89 TESTICULAR SWELLING, LEFT: ICD-10-CM

## 2024-04-22 DIAGNOSIS — N45.1 EPIDIDYMITIS, RIGHT: Primary | ICD-10-CM

## 2024-04-22 DIAGNOSIS — N50.812 PAIN IN LEFT TESTICLE: ICD-10-CM

## 2024-04-22 PROCEDURE — 76870 US EXAM SCROTUM: CPT

## 2024-04-22 RX ORDER — LEVOFLOXACIN 500 MG/1
500 TABLET, FILM COATED ORAL DAILY
Qty: 10 TABLET | Refills: 0 | Status: SHIPPED | OUTPATIENT
Start: 2024-04-22 | End: 2024-05-02

## 2024-05-22 ENCOUNTER — OFFICE VISIT (OUTPATIENT)
Dept: UROLOGY | Facility: CLINIC | Age: 49
End: 2024-05-22
Payer: COMMERCIAL

## 2024-05-22 VITALS — BODY MASS INDEX: 29.16 KG/M2 | HEIGHT: 73 IN | WEIGHT: 220 LBS

## 2024-05-22 DIAGNOSIS — N50.812 PAIN IN BOTH TESTICLES: Primary | ICD-10-CM

## 2024-05-22 DIAGNOSIS — N50.3 EPIDIDYMAL CYST: ICD-10-CM

## 2024-05-22 DIAGNOSIS — N50.811 PAIN IN BOTH TESTICLES: Primary | ICD-10-CM

## 2024-05-22 RX ORDER — SOD SULF/POT CHLORIDE/MAG SULF 1.479 G
TABLET ORAL
COMMUNITY

## 2024-05-22 NOTE — PROGRESS NOTES
Office Visit New Urology      Patient Name: Colin Cheema  : 1975   MRN: [id number]     Chief Complaint:    Chief Complaint   Patient presents with    Epididymitis       Referring Provider: Sherri Anna MD    History of Present Illness: Colin Cheema is a 48 y.o. male who presents to Urology today for evaluation secondary to testicular pain, swelling.  He reports a multi month history of intermittent left greater than right testicular pain and swelling.  He has trialed antibiotic therapy through primary care provider without significant improvement in symptoms.  He reports intermittent nature of symptoms.  He reports increased swelling, discomfort with increased activity levels.  He has been trialing conservative strategies to reduce discomfort.  He does have a family history of testicular malignancy in his father and therefore underwent a scrotal ultrasound for evaluation.  Ultrasound has demonstrated no intratesticular lesion, intact bilateral blood flow.  Patient found to have 1.4 cm left epididymal cyst.  He denies baseline lower urinary tract symptoms.  Denies dysuria, hematuria, history of urinary tract infection.  Denies past urologic evaluation including instrumentation or procedure.      Subjective      Review of System: Review of Systems   Genitourinary:  Negative for decreased urine volume, difficulty urinating, dysuria, enuresis, flank pain, frequency, hematuria and urgency.      I have reviewed the ROS documented by my clinical staff, updated appropriately and I agree. Liam Morales MD    Past Medical History:   Past Medical History:   Diagnosis Date    Achilles bursitis or tendinitis 2019    Acute stress disorder 2019    Allergic     Allergic rhinitis     COVID-19 virus infection 2022    Memory problems after Covid infection in August. Now improved.    GERD (gastroesophageal reflux disease)     Hyperlipidemia     Hypertension     Low back pain      Numbness and tingling in left arm 12/29/2015    Piriformis syndrome, left 05/06/2016    Plantar fasciitis 02/12/2016    left foot    Vitamin B 12 deficiency        Past Surgical History:   Past Surgical History:   Procedure Laterality Date    HERNIA REPAIR Bilateral 2001    Inguinal       Family History:   Family History   Problem Relation Age of Onset    Testicular cancer Father     Coronary artery disease Maternal Grandfather     Colon cancer Other        Social History:   Social History     Socioeconomic History    Marital status:    Tobacco Use    Smoking status: Never     Passive exposure: Never    Smokeless tobacco: Never   Vaping Use    Vaping status: Never Used   Substance and Sexual Activity    Alcohol use: Yes     Comment: socially    Sexual activity: Yes       Medications:     Current Outpatient Medications:     lisinopril-hydrochlorothiazide (PRINZIDE,ZESTORETIC) 20-12.5 MG per tablet, Take 1 tablet by mouth Daily., Disp: 90 tablet, Rfl: 1    Sodium Sulfate-Mag Sulfate-KCl (Sutab) 0082-225-297 MG tablet, , Disp: , Rfl:     Allergies:   Allergies   Allergen Reactions    Loratadine Unknown (See Comments)     Edgy feeling per pt        IPSS Questionnaire (AUA-7):  Over the past month…    1)  Incomplete Emptying  How often have you had a sensation of not emptying your bladder?  0 - Not at all   2)  Frequency  How often have you had to urinate less than every two hours? 0 - Not at all   3)  Intermittency  How often have you found you stopped and started again several times when you urinated?  0 - Not at all   4) Urgency  How often have you found it difficult to postpone urination?  0 - Not at all   5) Weak Stream  How often have you had a weak urinary stream?  0 - Not at all   6) Straining  How often have you had to push or strain to begin urination?  0 - Not at all   7) Nocturia  How many times did you typically get up at night to urinate?  0 - None   Total Score:  0       Quality of life due to  "urinary symptoms:  If you were to spend the rest of your life with your urinary condition the way it is now, how would you feel about that? 4-Mostly Dissatisfied   Urine Leakage (Incontinence) 0-No Leakage         Objective     Physical Exam:   Vital Signs:   Vitals:    05/22/24 0955   Weight: 99.8 kg (220 lb)   Height: 185.4 cm (72.99\")   PainSc: 0-No pain     Body mass index is 29.03 kg/m².     Physical Exam  Vitals and nursing note reviewed.   Constitutional:       Appearance: Normal appearance.   HENT:      Head: Normocephalic and atraumatic.   Cardiovascular:      Comments: Well perfused  Pulmonary:      Effort: Pulmonary effort is normal.   Abdominal:      General: Abdomen is flat.      Palpations: Abdomen is soft.   Musculoskeletal:         General: Normal range of motion.   Skin:     General: Skin is warm and dry.   Neurological:      General: No focal deficit present.      Mental Status: He is alert and oriented to person, place, and time. Mental status is at baseline.   Psychiatric:         Mood and Affect: Mood normal.         Behavior: Behavior normal.         Thought Content: Thought content normal.         Judgment: Judgment normal.         Genitourinary  Penis: circumcised penis, glans normal, no penile discharge.  No rashes/lesions.    Testes: descended bilaterally, no masses, spermatocele left inferior pole of the epididymis.  Mildly tender to palpation.  Remainder of scrotal contents normal. No hernia appreciated.       Labs:   Brief Urine Lab Results  (Last result in the past 365 days)        Color   Clarity   Blood   Leuk Est   Nitrite   Protein   CREAT   Urine HCG        03/05/24 0807             44.5         03/05/24 0807 Yellow   Clear   Negative   Negative   Negative   Negative                        Lab Results   Component Value Date    GLUCOSE 98 03/05/2024    CALCIUM 9.4 03/05/2024     03/05/2024    K 4.0 03/05/2024    CO2 25.8 03/05/2024     03/05/2024    BUN 19 03/05/2024    " CREATININE 0.88 03/05/2024    EGFRIFAFRI 99 07/27/2021    EGFRIFNONA 81 07/27/2021    BCR 21.6 03/05/2024    ANIONGAP 13.2 03/05/2024       Lab Results   Component Value Date    WBC 5.77 03/05/2024    HGB 15.9 03/05/2024    HCT 46.7 03/05/2024    MCV 87.5 03/05/2024     03/05/2024       Images:   US Scrotum & Testicles    Result Date: 4/22/2024  Impression: 1. No evidence of testicular torsion or testicular mass bilaterally 2. Findings suggest epididymitis of the body of the left epididymis 3. 1.7 cm left spermatocele 4. Small right hydrocele Electronically Signed: Cosme Lopes  4/22/2024 5:52 PM EDT  Workstation ID: [id number]      Measures:   Tobacco:   Colin Cheema  reports that he has never smoked. He has never been exposed to tobacco smoke. He has never used smokeless tobacco. I have educated him on the risk of diseases from using tobacco products.   Assessment / Plan      Assessment/Plan:   48 y.o. male is here today for evaluation of testicular pain, discomfort, swelling.  Scrotal ultrasound has revealed no intratesticular lesion, 1.4 cm left inferior epididymal cyst.  This was mildly tender to palpation on examination.  No additional concerning findings on examination.  Patient does have a family history of testicular malignancy in father.  We have reviewed ultrasound together.  We have discussed conservative strategies to reduce symptoms.  We have encouraged NSAID therapy at the time of symptoms.  Patient does not have any evidence of infectious related symptoms, we have discussed indication to avoid continued antibiotic therapy.  We have discussed indication for follow-up in approximately 6 months with repeat ultrasound given family history.  He is understanding and agreeable.    Diagnoses and all orders for this visit:    1. Pain in both testicles (Primary)    2. Epididymal cyst  -     US Scrotum & Testicles; Future           Follow Up:   Return in about 6 months (around 11/22/2024) for  Loyd.    I spent approximately 45 minutes providing clinical care for this patient; including review of patient's chart and provider documentation, face to face time spent with patient in examination room (obtaining history, performing physical exam, discussing diagnosis and management options), placing orders, and completing patient documentation.     Liam Morales MD  [facility]

## 2024-05-23 PROBLEM — N50.811 PAIN IN BOTH TESTICLES: Status: ACTIVE | Noted: 2024-04-17

## 2024-05-23 PROBLEM — N50.3 EPIDIDYMAL CYST: Status: ACTIVE | Noted: 2024-05-23

## 2024-07-11 RX ORDER — LISINOPRIL AND HYDROCHLOROTHIAZIDE 20; 12.5 MG/1; MG/1
1 TABLET ORAL DAILY
Qty: 30 TABLET | Refills: 1 | Status: SHIPPED | OUTPATIENT
Start: 2024-07-11

## 2024-09-06 ENCOUNTER — TELEPHONE (OUTPATIENT)
Age: 49
End: 2024-09-06
Payer: COMMERCIAL

## 2024-09-06 NOTE — TELEPHONE ENCOUNTER
"Relay     \"Your appointment with Dr. Morales on 11/26/24 has been canceled as Dr. Morales will be out of the office that day. Please call [phone] and we will get your appointment rescheduled at your convenience. Thank you and have a great day. \"                 "

## 2024-09-10 RX ORDER — FINASTERIDE 1 MG/1
1 TABLET, FILM COATED ORAL DAILY
Qty: 30 TABLET | Refills: 0 | OUTPATIENT
Start: 2024-09-10

## 2024-09-17 ENCOUNTER — TELEPHONE (OUTPATIENT)
Dept: INTERNAL MEDICINE | Facility: CLINIC | Age: 49
End: 2024-09-17
Payer: COMMERCIAL

## 2024-09-17 RX ORDER — FINASTERIDE 1 MG/1
1 TABLET, FILM COATED ORAL DAILY
Qty: 30 TABLET | Refills: 0 | Status: SHIPPED | OUTPATIENT
Start: 2024-09-17

## 2024-11-11 RX ORDER — LISINOPRIL AND HYDROCHLOROTHIAZIDE 12.5; 2 MG/1; MG/1
1 TABLET ORAL DAILY
Qty: 30 TABLET | Refills: 1 | Status: SHIPPED | OUTPATIENT
Start: 2024-11-11

## 2024-11-15 ENCOUNTER — TELEPHONE (OUTPATIENT)
Dept: INTERNAL MEDICINE | Facility: CLINIC | Age: 49
End: 2024-11-15

## 2024-12-09 RX ORDER — LISINOPRIL AND HYDROCHLOROTHIAZIDE 12.5; 2 MG/1; MG/1
1 TABLET ORAL DAILY
Qty: 30 TABLET | Refills: 1 | Status: SHIPPED | OUTPATIENT
Start: 2024-12-09

## 2024-12-09 NOTE — TELEPHONE ENCOUNTER
Caller: [id number] - [place], [place] - [address] AT [facility] - [phone] [address] [phone] FX    Relationship: Pharmacy    Best call back number: [phone]     Requested Prescriptions:   Requested Prescriptions     Pending Prescriptions Disp Refills    lisinopril-hydrochlorothiazide (PRINZIDE,ZESTORETIC) 20-12.5 MG per tablet 30 tablet 1     Sig: Take 1 tablet by mouth Daily.        Pharmacy where request should be sent: [id number] - [place], [place] - [address]  AT [facility] - [phone] [address] [phone] FX     Last office visit with prescribing clinician: 4/15/2024   Last telemedicine visit with prescribing clinician: Visit date not found   Next office visit with prescribing clinician: Visit date not found     Additional details provided by patient: PATIENT IS OUT OF THE MEDICATION     Does the patient have less than a 3 day supply:  [x] Yes  [] No    Would you like a call back once the refill request has been completed: [x] Yes [] No    If the office needs to give you a call back, can they leave a voicemail: [x] Yes [] No    Dudley Arias Rep   12/09/24 12:50 EST

## 2025-01-09 ENCOUNTER — OFFICE VISIT (OUTPATIENT)
Dept: INTERNAL MEDICINE | Facility: CLINIC | Age: 50
End: 2025-01-09
Payer: COMMERCIAL

## 2025-01-09 VITALS
SYSTOLIC BLOOD PRESSURE: 104 MMHG | BODY MASS INDEX: 28.81 KG/M2 | TEMPERATURE: 99.6 F | WEIGHT: 217.4 LBS | HEIGHT: 73 IN | DIASTOLIC BLOOD PRESSURE: 76 MMHG | OXYGEN SATURATION: 98 % | HEART RATE: 80 BPM

## 2025-01-09 DIAGNOSIS — R53.83 MALAISE AND FATIGUE: ICD-10-CM

## 2025-01-09 DIAGNOSIS — R50.9 FEVER AND CHILLS: ICD-10-CM

## 2025-01-09 DIAGNOSIS — J10.1 INFLUENZA A: Primary | ICD-10-CM

## 2025-01-09 DIAGNOSIS — R53.81 MALAISE AND FATIGUE: ICD-10-CM

## 2025-01-09 DIAGNOSIS — R51.9 ACUTE NONINTRACTABLE HEADACHE, UNSPECIFIED HEADACHE TYPE: ICD-10-CM

## 2025-01-09 DIAGNOSIS — R05.1 ACUTE COUGH: ICD-10-CM

## 2025-01-09 LAB
EXPIRATION DATE: ABNORMAL
EXPIRATION DATE: NORMAL
FLUAV AG UPPER RESP QL IA.RAPID: DETECTED
FLUBV AG UPPER RESP QL IA.RAPID: NOT DETECTED
INTERNAL CONTROL: ABNORMAL
INTERNAL CONTROL: NORMAL
Lab: ABNORMAL
Lab: NORMAL
S PYO AG THROAT QL: NEGATIVE
SARS-COV-2 AG UPPER RESP QL IA.RAPID: NOT DETECTED

## 2025-01-09 PROCEDURE — 99214 OFFICE O/P EST MOD 30 MIN: CPT | Performed by: INTERNAL MEDICINE

## 2025-01-09 PROCEDURE — 87880 STREP A ASSAY W/OPTIC: CPT | Performed by: INTERNAL MEDICINE

## 2025-01-09 PROCEDURE — 87428 SARSCOV & INF VIR A&B AG IA: CPT | Performed by: INTERNAL MEDICINE

## 2025-01-09 RX ORDER — OSELTAMIVIR PHOSPHATE 75 MG/1
75 CAPSULE ORAL 2 TIMES DAILY
Qty: 10 CAPSULE | Refills: 0 | Status: SHIPPED | OUTPATIENT
Start: 2025-01-09 | End: 2025-01-09

## 2025-01-09 RX ORDER — DEXTROMETHORPHAN HYDROBROMIDE AND PROMETHAZINE HYDROCHLORIDE 15; 6.25 MG/5ML; MG/5ML
5 SYRUP ORAL 4 TIMES DAILY PRN
Qty: 180 ML | Refills: 0 | Status: SHIPPED | OUTPATIENT
Start: 2025-01-09

## 2025-01-09 RX ORDER — DEXTROMETHORPHAN HYDROBROMIDE AND PROMETHAZINE HYDROCHLORIDE 15; 6.25 MG/5ML; MG/5ML
5 SYRUP ORAL 4 TIMES DAILY PRN
Qty: 180 ML | Refills: 0 | Status: SHIPPED | OUTPATIENT
Start: 2025-01-09 | End: 2025-01-09

## 2025-01-09 RX ORDER — OSELTAMIVIR PHOSPHATE 75 MG/1
75 CAPSULE ORAL 2 TIMES DAILY
Qty: 10 CAPSULE | Refills: 0 | Status: SHIPPED | OUTPATIENT
Start: 2025-01-09 | End: 2025-01-14

## 2025-01-09 NOTE — PROGRESS NOTES
[facility]     Colin Cheema  1975   [id number]      Patient Care Team:  Sherri Anna MD as PCP - General (Internal Medicine)  Sherri Anna MD as PCP - Internal Medicine (Internal Medicine)    Chief Complaint   Patient presents with    Cough    Fever    Headache    URI    Fatigue        Patient is a 49 y.o. male.   History of Present Illness  The patient presents for an acute visit.    He has been experiencing symptoms of illness for the past few days, with a noted improvement today. He managed to get some sleep last night, unlike the previous night. He reports a severe cough without sputum production and mild shortness of breath. He did not have a sore throat initially and does not experience wheezing or chest tightness but admits to difficulty in breathing. He has not monitored his temperature at home but experienced sweating and chills last night, which he believes may have been a fever. He also reports frontal headaches, mild sinus congestion, and decreased hearing acuity, although he does not have ear pain. He does not have neck soreness but has experienced a few sneezing episodes and some postnasal drainage. He reports fatigue, difficulty climbing stairs, and loss of appetite over the past 2 days. His diet has been limited to oatmeal for breakfast and soup for dinner. His cough was severe the night before last, disrupting his sleep, but it did not wake him up last night. He is concerned about potential lung issues, given his family history of pneumonia in his grandfather and father. He resides with his wife and youngest daughter, who is 15.5 years old.    FAMILY HISTORY  His grandfather and father have had lung issues and pneumonia.         CHRONIC CONDITIONS      Past Medical History:   Diagnosis Date    Achilles bursitis or tendinitis 2/6/2019    Acute stress disorder 2/6/2019    Allergic     Allergic rhinitis     COVID-19 virus infection 11/18/2022    Memory problems  "after Covid infection in August. Now improved.    GERD (gastroesophageal reflux disease)     Hyperlipidemia     Hypertension     Low back pain     Numbness and tingling in left arm 12/29/2015    Piriformis syndrome, left 05/06/2016    Plantar fasciitis 02/12/2016    left foot    Vitamin B 12 deficiency        Past Surgical History:   Procedure Laterality Date    HERNIA REPAIR Bilateral 2001    Inguinal       Family History   Problem Relation Age of Onset    Testicular cancer Father     Coronary artery disease Maternal Grandfather     Colon cancer Other        Social History     Socioeconomic History    Marital status:    Tobacco Use    Smoking status: Never     Passive exposure: Never    Smokeless tobacco: Never   Vaping Use    Vaping status: Never Used   Substance and Sexual Activity    Alcohol use: Yes     Comment: socially    Sexual activity: Yes       Allergies   Allergen Reactions    Loratadine Unknown (See Comments)     Edgy feeling per pt        Review of Systems:     Review of Systems    Vital Signs  Vitals:    01/09/25 0906   BP: 104/76   BP Location: Left arm   Patient Position: Sitting   Cuff Size: Adult   Pulse: 80   Temp: 99.6 °F (37.6 °C)   TempSrc: Infrared   SpO2: 98%   Weight: 98.6 kg (217 lb 6.4 oz)   Height: 185.4 cm (72.99\")   PainSc: 0-No pain     Body mass index is 28.69 kg/m².  BMI is >= 25 and <30. (Overweight) The following options were offered after discussion;: exercise counseling/recommendations and nutrition counseling/recommendations          Current Outpatient Medications:     finasteride (PROPECIA) 1 MG tablet, Take 1 tablet by mouth Daily., Disp: 30 tablet, Rfl: 0    lisinopril-hydrochlorothiazide (PRINZIDE,ZESTORETIC) 20-12.5 MG per tablet, Take 1 tablet by mouth Daily., Disp: 30 tablet, Rfl: 1    Sodium Sulfate-Mag Sulfate-KCl (Sutab) 0793-448-568 MG tablet, , Disp: , Rfl:     oseltamivir (Tamiflu) 75 MG capsule, Take 1 capsule by mouth 2 (Two) Times a Day for 5 days., Disp: " 10 capsule, Rfl: 0    promethazine-dextromethorphan (PROMETHAZINE-DM) 6.25-15 MG/5ML syrup, Take 5 mL by mouth 4 (Four) Times a Day As Needed for Cough., Disp: 180 mL, Rfl: 0    Physical Exam:    Physical Exam  Vitals and nursing note reviewed.   Constitutional:       General: He is not in acute distress.     Appearance: He is well-developed. He is ill-appearing.   HENT:      Head: Normocephalic.   Eyes:      Conjunctiva/sclera: Conjunctivae normal.      Pupils: Pupils are equal, round, and reactive to light.   Neck:      Thyroid: No thyromegaly.   Cardiovascular:      Rate and Rhythm: Normal rate and regular rhythm.      Heart sounds: Normal heart sounds.   Pulmonary:      Effort: Pulmonary effort is normal.      Breath sounds: Normal breath sounds. No wheezing.      Comments: Deep frequent cough throughout the visit  Musculoskeletal:         General: Normal range of motion.      Cervical back: Normal range of motion and neck supple.   Lymphadenopathy:      Cervical: No cervical adenopathy.   Skin:     General: Skin is warm and dry.   Neurological:      Mental Status: He is alert and oriented to person, place, and time.   Psychiatric:         Thought Content: Thought content normal.          ACE III MINI        Results Review:    I reviewed the patient's new clinical results.  Results  Laboratory Studies  Influenza A test is positive.       CMP:  Lab Results   Component Value Date    Glucose 98 03/05/2024    Glucose, UA Negative 03/05/2024    BUN 19 03/05/2024    BUN/Creatinine Ratio 21.6 03/05/2024    Creatinine 0.88 03/05/2024    Creatinine, Urine 44.5 03/05/2024    Ketones, UA Negative 03/05/2024    CO2 25.8 03/05/2024    Calcium 9.4 03/05/2024    Albumin 4.7 03/05/2024    AST (SGOT) 23 03/05/2024    ALT (SGPT) 28 03/05/2024     HbA1c:  Lab Results   Component Value Date    HGBA1C 5.60 03/05/2024     Microalbumin:  Lab Results   Component Value Date    MICROALBUR <1.2 03/05/2024     Lipid Panel  Lab Results    Component Value Date    CHOL 162 03/05/2024    TRIG 115 03/05/2024    HDL 39 (L) 03/05/2024     (H) 03/05/2024    AST 23 03/05/2024    ALT 28 03/05/2024       Medication Review: Medications reviewed and noted  Patient Instructions   Problem List Items Addressed This Visit          Infectious Diseases    Influenza A - Primary    Relevant Medications    oseltamivir (Tamiflu) 75 MG capsule       Neuro    Acute nonintractable headache       Symptoms and Signs    Fever and chills    Malaise and fatigue       Other    Acute cough          Diagnosis Plan   1. Influenza A        2. Acute cough        3. Fever and chills        4. Acute nonintractable headache, unspecified headache type        5. Malaise and fatigue          Assessment & Plan  1. Influenza A.  He presents with symptoms including cough, fever, chills, headache, malaise, and fatigue. His oxygen saturation is 98%, and his temperature is 99.6, which is higher than usual but not fever high. He will take Tamiflu 75 mg twice a day for 5 days. A prophylactic prescription for Tamiflu 75 mg once daily for 10 days has been sent to his wife. He will use promethazine DM cough syrup as needed and Cepacol lozenges for cough relief. For congestion, he will take Mucinex plain 600 mg twice a day. Tylenol will be used as needed for fever, chills, headache, and myalgias. He is advised to drink plenty of fluids, including hot tea with lemon and honey. He is to stay at home for the next 5 days or until he feels well, is afebrile, and not coughing much.    Follow-up  He will come back to see me for a yearly physical soon.         Plan of care reviewed with patient at the conclusion of today's visit. Education was provided regarding diagnosis, management, and any prescribed or recommended OTC medications. Patient verbalizes understanding of and agreement with management plan.         01/09/25   09:08 EST    Patient or patient representative verbalized consent for the use  of Ambient Listening during the visit with  Sherri Anna MD for chart documentation. 1/9/2025  09:35 EST

## 2025-01-09 NOTE — PATIENT INSTRUCTIONS
Patient Instructions  Problem List Items Addressed This Visit          Infectious Diseases    Influenza A - Primary    Relevant Medications    oseltamivir (Tamiflu) 75 MG capsule       Neuro    Acute nonintractable headache       Symptoms and Signs    Fever and chills    Malaise and fatigue       Other    Acute cough       Exercising to Stay Healthy  To become healthy and stay healthy, it is recommended that you do moderate-intensity and vigorous-intensity exercise. You can tell that you are exercising at a moderate intensity if your heart starts beating faster and you start breathing faster but can still hold a conversation. You can tell that you are exercising at a vigorous intensity if you are breathing much harder and faster and cannot hold a conversation while exercising.  How can exercise benefit me?  Exercising regularly is important. It has many health benefits, such as:  Improving overall fitness, flexibility, and endurance.  Increasing bone density.  Helping with weight control.  Decreasing body fat.  Increasing muscle strength and endurance.  Reducing stress and tension, anxiety, depression, or anger.  Improving overall health.  What guidelines should I follow while exercising?  Before you start a new exercise program, talk with your health care provider.  Do not exercise so much that you hurt yourself, feel dizzy, or get very short of breath.  Wear comfortable clothes and wear shoes with good support.  Drink plenty of water while you exercise to prevent dehydration or heat stroke.  Work out until your breathing and your heartbeat get faster (moderate intensity).  How often should I exercise?  Choose an activity that you enjoy, and set realistic goals. Your health care provider can help you make an activity plan that is individually designed and works best for you.  Exercise regularly as told by your health care provider. This may include:  Doing strength training two times a week, such as:  Lifting  weights.  Using resistance bands.  Push-ups.  Sit-ups.  Yoga.  Doing a certain intensity of exercise for a given amount of time. Choose from these options:  A total of 150 minutes of moderate-intensity exercise every week.  A total of 75 minutes of vigorous-intensity exercise every week.  A mix of moderate-intensity and vigorous-intensity exercise every week.  Children, pregnant women, people who have not exercised regularly, people who are overweight, and older adults may need to talk with a health care provider about what activities are safe to perform. If you have a medical condition, be sure to talk with your health care provider before you start a new exercise program.  What are some exercise ideas?  Moderate-intensity exercise ideas include:  Walking 1 mile (1.6 km) in about 15 minutes.  Biking.  Hiking.  Golfing.  Dancing.  Water aerobics.  Vigorous-intensity exercise ideas include:  Walking 4.5 miles (7.2 km) or more in about 1 hour.  Jogging or running 5 miles (8 km) in about 1 hour.  Biking 10 miles (16.1 km) or more in about 1 hour.  Lap swimming.  Roller-skating or in-line skating.  Cross-country skiing.  Vigorous competitive sports, such as football, basketball, and soccer.  Jumping rope.  Aerobic dancing.  What are some everyday activities that can help me get exercise?  Yard work, such as:  Pushing a .  Raking and bagging leaves.  Washing your car.  Pushing a stroller.  Shoveling snow.  Gardening.  Washing windows or floors.  How can I be more active in my day-to-day activities?  Use stairs instead of an elevator.  Take a walk during your lunch break.  If you drive, park your car farther away from your work or school.  If you take public transportation, get off one stop early and walk the rest of the way.  Stand up or walk around during all of your indoor phone calls.  Get up, stretch, and walk around every 30 minutes throughout the day.  Enjoy exercise with a friend. Support to continue  exercising will help you keep a regular routine of activity.  Where to find more information  You can find more information about exercising to stay healthy from:  [place] Department of Health and Human Services: www.hhs.gov  Centers for Disease Control and Prevention (CDC): www.cdc.gov  Summary  Exercising regularly is important. It will improve your overall fitness, flexibility, and endurance.  Regular exercise will also improve your overall health. It can help you control your weight, reduce stress, and improve your bone density.  Do not exercise so much that you hurt yourself, feel dizzy, or get very short of breath.  Before you start a new exercise program, talk with your health care provider.  This information is not intended to replace advice given to you by your health care provider. Make sure you discuss any questions you have with your health care provider.  Document Revised: 04/15/2022 Document Reviewed: 04/15/2022  Elsevier Patient Education © 2023 [employer] Inc[employer]

## 2025-03-05 RX ORDER — LISINOPRIL AND HYDROCHLOROTHIAZIDE 12.5; 2 MG/1; MG/1
1 TABLET ORAL DAILY
Qty: 30 TABLET | Refills: 1 | Status: SHIPPED | OUTPATIENT
Start: 2025-03-05

## 2025-03-10 RX ORDER — FINASTERIDE 1 MG/1
1 TABLET, FILM COATED ORAL DAILY
Qty: 90 TABLET | Refills: 0 | Status: SHIPPED | OUTPATIENT
Start: 2025-03-10

## 2025-04-07 RX ORDER — LISINOPRIL AND HYDROCHLOROTHIAZIDE 12.5; 2 MG/1; MG/1
1 TABLET ORAL DAILY
Qty: 30 TABLET | Refills: 1 | Status: SHIPPED | OUTPATIENT
Start: 2025-04-07

## 2025-04-07 NOTE — TELEPHONE ENCOUNTER
Rx Refill Note  Requested Prescriptions     Pending Prescriptions Disp Refills    lisinopril-hydrochlorothiazide (PRINZIDE,ZESTORETIC) 20-12.5 MG per tablet [Pharmacy Med Name: LISINOPRIL-HCTZ 20-12.5 MG TAB] 30 tablet 1     Sig: TAKE 1 TABLET BY MOUTH DAILY      Last office visit with prescribing clinician: 1/9/2025   Last telemedicine visit with prescribing clinician: Visit date not found   Next office visit with prescribing clinician: 4/17/2025                         Would you like a call back once the refill request has been completed: [] Yes [] No    If the office needs to give you a call back, can they leave a voicemail: [] Yes [] No    Olesya Purvis LPN  04/07/25, 08:15 EDT  
home

## 2025-04-17 ENCOUNTER — OFFICE VISIT (OUTPATIENT)
Dept: INTERNAL MEDICINE | Facility: CLINIC | Age: 50
End: 2025-04-17
Payer: COMMERCIAL

## 2025-04-17 VITALS
HEART RATE: 58 BPM | OXYGEN SATURATION: 96 % | DIASTOLIC BLOOD PRESSURE: 88 MMHG | SYSTOLIC BLOOD PRESSURE: 134 MMHG | BODY MASS INDEX: 29.72 KG/M2 | HEIGHT: 73 IN | WEIGHT: 224.2 LBS | TEMPERATURE: 98.6 F

## 2025-04-17 DIAGNOSIS — E66.3 OVERWEIGHT WITH BODY MASS INDEX (BMI) OF 29 TO 29.9 IN ADULT: Chronic | ICD-10-CM

## 2025-04-17 DIAGNOSIS — G89.29 CHRONIC PAIN OF LEFT KNEE: ICD-10-CM

## 2025-04-17 DIAGNOSIS — E78.2 MIXED HYPERLIPIDEMIA: ICD-10-CM

## 2025-04-17 DIAGNOSIS — M25.562 CHRONIC PAIN OF LEFT KNEE: ICD-10-CM

## 2025-04-17 DIAGNOSIS — E53.8 B12 DEFICIENCY: ICD-10-CM

## 2025-04-17 DIAGNOSIS — I10 BENIGN ESSENTIAL HYPERTENSION: ICD-10-CM

## 2025-04-17 DIAGNOSIS — Z00.00 ANNUAL PHYSICAL EXAM: Primary | ICD-10-CM

## 2025-04-17 PROCEDURE — 99396 PREV VISIT EST AGE 40-64: CPT | Performed by: INTERNAL MEDICINE

## 2025-04-17 RX ORDER — LISINOPRIL AND HYDROCHLOROTHIAZIDE 12.5; 2 MG/1; MG/1
1 TABLET ORAL DAILY
Qty: 90 TABLET | Refills: 3 | Status: SHIPPED | OUTPATIENT
Start: 2025-04-17

## 2025-04-17 NOTE — PROGRESS NOTES
[facility]     Colin Cheema  1975   [id number]      Patient Care Team:  Sherri Anna MD as PCP - General (Internal Medicine)  Sherri Anna MD as PCP - Internal Medicine (Internal Medicine)    Chief Complaint   Patient presents with    Annual Exam    Hypertension        Patient is a 49 y.o. male.   History of Present Illness  The patient presents for an annual physical exam.    He reports a general sense of well-being, with no significant health concerns at present. Regular physical activity is maintained, including swimming, gym workouts, Peloton sessions 2 to 3 times per week, outdoor running once or twice a week, and daily yoga practices lasting 15 to 20 minutes. Backyard work is also performed. Despite his active lifestyle, dietary fat intake and portion sizes need to be reduced. Occasional beer consumption is admitted. Blood pressure is not monitored at home but tends to increase when weight exceeds 215 pounds. He recalls feeling well when his weight was around 205 to 206 pounds last summer, with lower blood pressure readings. Salt intake is not consciously limited. Currently on lisinopril and hydrochlorothiazide, which are tolerated well, but a refill is required. Weight loss and sodium restriction are believed to benefit blood pressure control. Occasional heartburn and indigestion occur, particularly after consuming spicy foods. His daughter has observed wheezing when laughing intensely. No shortness of breath during exercise or exercise-induced coughing is reported. Mild shortness of breath during exercise is attributed to his current weight of 215 pounds. B12 supplements are not taken. A colonoscopy was performed in 2021 with advice to repeat it in 5 years.    Knee pain is experienced, attributed to aging and a history of running, biking, and other sports activities. The pain is not progressive, and no swelling is reported. Plans to reduce Peloton usage and increase  "participation in sand volleyball over the summer are mentioned.    Back pain is reported, alleviated by yoga, and not believed to be structural in nature. Monthly physical therapy sessions are attended, involving realignment procedures and dry needling.    PAST SURGICAL HISTORY:  Colonoscopy in 2021  EGD in 2021    SOCIAL HISTORY  He admits to drinking beer occasionally.         CHRONIC CONDITIONS  ***    Past Medical History:   Diagnosis Date    Achilles bursitis or tendinitis 2/6/2019    Acute stress disorder 2/6/2019    Allergic     Allergic rhinitis     COVID-19 virus infection 11/18/2022    Memory problems after Covid infection in August. Now improved.    GERD (gastroesophageal reflux disease)     Hyperlipidemia     Hypertension     Low back pain     Numbness and tingling in left arm 12/29/2015    Piriformis syndrome, left 05/06/2016    Plantar fasciitis 02/12/2016    left foot    Vitamin B 12 deficiency        Past Surgical History:   Procedure Laterality Date    HERNIA REPAIR Bilateral 2001    Inguinal    VASECTOMY  2010       Family History   Problem Relation Age of Onset    Testicular cancer Father     Coronary artery disease Maternal Grandfather     Colon cancer Other        Social History     Socioeconomic History    Marital status:    Tobacco Use    Smoking status: Never     Passive exposure: Never    Smokeless tobacco: Never   Vaping Use    Vaping status: Never Used   Substance and Sexual Activity    Alcohol use: Yes     Comment: socially    Sexual activity: Yes       Allergies   Allergen Reactions    Loratadine Unknown (See Comments)     Edgy feeling per pt        Review of Systems:     Review of Systems    Vital Signs  Vitals:    04/17/25 0841   BP: 134/88   BP Location: Left arm   Patient Position: Sitting   Cuff Size: Adult   Pulse: 58   Temp: 98.6 °F (37 °C)   TempSrc: Infrared   SpO2: 96%   Weight: 102 kg (224 lb 3.2 oz)   Height: 185.4 cm (72.99\")   PainSc: 0-No pain     Body mass index is " "29.59 kg/m².  {BMI is >= 25 and <30. (Overweight) The following options were offered after discussion; (Optional):55532}          Current Outpatient Medications:     finasteride (PROPECIA) 1 MG tablet, TAKE 1 TABLET BY MOUTH DAILY, Disp: 90 tablet, Rfl: 0    lisinopril-hydrochlorothiazide (PRINZIDE,ZESTORETIC) 20-12.5 MG per tablet, TAKE 1 TABLET BY MOUTH DAILY, Disp: 30 tablet, Rfl: 1    promethazine-dextromethorphan (PROMETHAZINE-DM) 6.25-15 MG/5ML syrup, Take 5 mL by mouth 4 (Four) Times a Day As Needed for Cough. (Patient not taking: Reported on 4/17/2025), Disp: 180 mL, Rfl: 0    Sodium Sulfate-Mag Sulfate-KCl (Sutab) 9924-738-631 MG tablet, , Disp: , Rfl:     Physical Exam:    Physical Exam     ACE III MINI        Results Review:    {Results Review:35986::\"I reviewed the patient's new clinical results.\"}  Results  Labs   - B12 level: 339       CMP:  Lab Results   Component Value Date    Glucose 98 03/05/2024    Glucose, UA Negative 03/05/2024    BUN 19 03/05/2024    BUN/Creatinine Ratio 21.6 03/05/2024    Creatinine 0.88 03/05/2024    Creatinine, Urine 44.5 03/05/2024    Ketones, UA Negative 03/05/2024    CO2 25.8 03/05/2024    Calcium 9.4 03/05/2024    Albumin 4.7 03/05/2024    AST (SGOT) 23 03/05/2024    ALT (SGPT) 28 03/05/2024     HbA1c:  Lab Results   Component Value Date    HGBA1C 5.60 03/05/2024     Microalbumin:  Lab Results   Component Value Date    MICROALBUR <1.2 03/05/2024     Lipid Panel  Lab Results   Component Value Date    CHOL 162 03/05/2024    TRIG 115 03/05/2024    HDL 39 (L) 03/05/2024     (H) 03/05/2024    AST 23 03/05/2024    ALT 28 03/05/2024       Medication Review: Medications reviewed and noted  Patient Instructions   Problem List Items Addressed This Visit    None        No diagnosis found.  Assessment & Plan  Benign essential hypertension.  - Blood pressure today is 134/88, compared to 104/76 in January.  - Continue taking lisinopril and hydrochlorothiazide daily.  - Decrease " salt intake and maintain regular exercise.  - Home blood pressure monitoring is recommended to ensure readings are closer to 120/70. A 90-day supply of lisinopril and hydrochlorothiazide will be provided.    Mixed hyperlipidemia.  - Continue reducing fats, carbohydrates, and sugars in the diet.  - Regular exercise should be maintained.  - Cholesterol levels will be monitored with fasting labs next week.    B12 deficiency.  - B12 level was 339, which is in the lower part of the normal range.  - Take a B12 tablet daily.  - Monitor B12 levels to ensure they are >500.    Chronic pain of the left knee.  - Weight loss is recommended.  - Use a cold pack on the knee as needed after running or exercising.  - Voltaren gel can be used to decrease inflammation and aching.    Overweight.  - BMI is 29.  - Encouraged to lose weight gradually with a goal of reaching 200 lbs.  - A low-fat, healthy diet with smaller portions is recommended.  - Regular exercise should be continued.    Follow-up  - Follow up in 6 months for a fasting follow-up.     {Time Spent (Optional):20732}    Plan of care reviewed with patient at the conclusion of today's visit. Education was provided regarding diagnosis, management, and any prescribed or recommended OTC medications. Patient verbalizes understanding of and agreement with management plan.         04/17/25   08:43 EDT

## 2025-04-17 NOTE — PROGRESS NOTES
Preventative Annual Visit    Colin Cheema  1975   [id number]    Patient Care Team:  Sherri Anna MD as PCP - General (Internal Medicine)  Sherri Anna MD as PCP - Internal Medicine (Internal Medicine)    Chief Complaint::   Chief Complaint   Patient presents with    Annual Exam    Hypertension        Subjective   History of Present Illness  The patient presents for an annual physical exam.    He reports a general sense of well-being, with no significant health concerns at present. Regular physical activity is maintained, including swimming, gym workouts, Peloton sessions 2 to 3 times per week, outdoor running once or twice a week, and daily yoga practices lasting 15 to 20 minutes. Backyard work is also performed. Despite his active lifestyle, dietary fat intake and portion sizes need to be reduced. Occasional beer consumption is admitted. Blood pressure is not monitored at home but tends to increase when weight exceeds 215 pounds. He recalls feeling well when his weight was around 205 to 206 pounds last summer, with lower blood pressure readings. Salt intake is not consciously limited. Currently on lisinopril and hydrochlorothiazide, which are tolerated well, but a refill is required. Weight loss and sodium restriction are believed to benefit blood pressure control. Occasional heartburn and indigestion occur, particularly after consuming spicy foods. His daughter has observed wheezing when laughing intensely. No shortness of breath during exercise or exercise-induced coughing is reported. Mild shortness of breath during exercise is attributed to his current weight of 215 pounds. B12 supplements are not taken. A colonoscopy was performed in 2021 with advice to repeat it in 5 years.    Knee pain is experienced, attributed to aging and a history of running, biking, and other sports activities. The pain is not progressive, and no swelling is reported. Plans to reduce Peloton usage and increase  participation in sand volleyball over the summer are mentioned.    Back pain is reported, alleviated by yoga, and not believed to be structural in nature. Monthly physical therapy sessions are attended, involving realignment procedures and dry needling.    PAST SURGICAL HISTORY:  Colonoscopy in 2021  EGD in 2021    SOCIAL HISTORY  He admits to drinking beer occasionally.       Colin Cheema is a 49 y.o. male who presents for an Annual Wellness Visit.    CHRONIC CONDITIONS    Patient Active Problem List   Diagnosis    Back pain without radiculopathy    Gastroesophageal reflux disease without esophagitis    Esophageal dysphagia    B12 deficiency    Mixed hyperlipidemia    Benign essential hypertension    Seborrheic dermatitis    Left-sided low back pain with sciatica    Plantar fasciitis    Fatigue    Allergic rhinitis    Annual physical exam    Actinic keratosis    Overweight with body mass index (BMI) of 29 to 29.9 in adult    Paresthesia of left arm    Precordial pain    Leg cramps    Acute intractable tension-type headache    Hemicrania continua    Recurrent unilateral inguinal hernia    Testicular swelling, left    Upper back pain on left side    Chronic pain of left knee    Encounter for cardiac risk counseling    Dizziness and giddiness    Sensorineural hearing loss (SNHL) of both ears    Lipoma of torso    Pain in both testicles    Epididymal cyst    Fever and chills    Acute nonintractable headache    Malaise and fatigue    Influenza A        Past Medical History:   Diagnosis Date    Achilles bursitis or tendinitis 2/6/2019    Acute stress disorder 2/6/2019    Allergic     Allergic rhinitis     COVID-19 virus infection 11/18/2022    Memory problems after Covid infection in August. Now improved.    GERD (gastroesophageal reflux disease)     Hyperlipidemia     Hypertension     Low back pain     Numbness and tingling in left arm 12/29/2015    Piriformis syndrome, left 05/06/2016    Plantar fasciitis  "02/12/2016    left foot    Vitamin B 12 deficiency        Past Surgical History:   Procedure Laterality Date    HERNIA REPAIR Bilateral 2001    Inguinal    VASECTOMY  2010       Family History   Problem Relation Age of Onset    Testicular cancer Father     Coronary artery disease Maternal Grandfather     Colon cancer Other        Social History     Socioeconomic History    Marital status:    Tobacco Use    Smoking status: Never     Passive exposure: Never    Smokeless tobacco: Never   Vaping Use    Vaping status: Never Used   Substance and Sexual Activity    Alcohol use: Yes     Comment: socially    Sexual activity: Yes       Allergies   Allergen Reactions    Loratadine Unknown (See Comments)     Edgy feeling per pt          Current Outpatient Medications:     finasteride (PROPECIA) 1 MG tablet, TAKE 1 TABLET BY MOUTH DAILY, Disp: 90 tablet, Rfl: 0    lisinopril-hydrochlorothiazide (PRINZIDE,ZESTORETIC) 20-12.5 MG per tablet, Take 1 tablet by mouth Daily., Disp: 90 tablet, Rfl: 3    Immunization History   Administered Date(s) Administered    COVID-19 (PFIZER) Purple Cap Monovalent 03/02/2021, 03/23/2021, 12/01/2021    DTP 08/27/1980    Hepatitis A 08/22/2018    Influenza, Unspecified 09/16/2020    MMR 01/02/1976    OPV 08/27/1980    Tdap 12/29/2015        Health Maintenance Due   Topic Date Due    COVID-19 Vaccine (4 - 2024-25 season) 09/01/2024    ANNUAL PHYSICAL  11/20/2024    LIPID PANEL  03/05/2025        Review of Systems     Vital Signs  Vitals:    04/17/25 0841   BP: 134/88   BP Location: Left arm   Patient Position: Sitting   Cuff Size: Adult   Pulse: 58   Temp: 98.6 °F (37 °C)   TempSrc: Infrared   SpO2: 96%   Weight: 102 kg (224 lb 3.2 oz)   Height: 185.4 cm (72.99\")   PainSc: 0-No pain     BMI is >= 25 and <30. (Overweight) The following options were offered after discussion;: exercise counseling/recommendations, nutrition counseling/recommendations, and Information on healthy weight added to " patient's after visit summary.     Physical Exam  Vitals and nursing note reviewed.   Constitutional:       Appearance: He is well-developed.   HENT:      Head: Normocephalic.   Eyes:      Conjunctiva/sclera: Conjunctivae normal.      Pupils: Pupils are equal, round, and reactive to light.   Neck:      Thyroid: No thyromegaly.   Cardiovascular:      Rate and Rhythm: Normal rate and regular rhythm.      Heart sounds: Normal heart sounds.   Pulmonary:      Effort: Pulmonary effort is normal.      Breath sounds: Normal breath sounds. No wheezing.   Abdominal:      General: Bowel sounds are normal.      Palpations: Abdomen is soft.      Tenderness: There is no abdominal tenderness.   Musculoskeletal:         General: No tenderness. Normal range of motion.      Cervical back: Normal range of motion and neck supple.      Right lower leg: No edema.      Left lower leg: No edema.   Lymphadenopathy:      Cervical: No cervical adenopathy.   Skin:     General: Skin is warm and dry.      Findings: No rash.   Neurological:      Mental Status: He is alert and oriented to person, place, and time.      Cranial Nerves: No cranial nerve deficit.      Sensory: No sensory deficit.      Coordination: Coordination normal.      Gait: Gait normal.   Psychiatric:         Speech: Speech normal.         Behavior: Behavior normal.         Thought Content: Thought content normal.         Judgment: Judgment normal.          Procedures     Fall Risk Screen:  [facility] Fall Risk Assessment has not been completed.    Health Habits and Functional and Cognitive Screening:       No data to display                Smoking Status:  Social History     Tobacco Use   Smoking Status Never    Passive exposure: Never   Smokeless Tobacco Never       Alcohol Consumption:  Social History     Substance and Sexual Activity   Alcohol Use Yes    Comment: socially       Depression [place]  PHQ-9:        4/17/2025     8:41 AM   PHQ-2/PHQ-9 Depression Screening   Little  interest or pleasure in doing things Not at all   Feeling down, depressed, or hopeless Not at all   How difficult have these problems made it for you to do your work, take care of things at home, or get along with other people? Not difficult at all           Labs  Results for orders placed or performed in visit on 01/09/25   POCT SARS-CoV-2 + Flu Antigen CON    Collection Time: 01/09/25  9:39 AM    Specimen: Swab   Result Value Ref Range    SARS Antigen Not Detected Not Detected, Presumptive Negative    Influenza A Antigen CON Detected (A) Not Detected    Influenza B Antigen CON Not Detected Not Detected    Internal Control Passed Passed    Lot Number [id number]     Expiration Date 10,232,025    POC Rapid Strep A    Collection Time: 01/09/25  9:39 AM    Specimen: Swab   Result Value Ref Range    Rapid Strep A Screen Negative Negative, VALID, INVALID, Not Performed    Internal Control Passed Passed    Lot Number [id number]     Expiration Date 1,032,027       Results  Labs   - B12 level: 339    Assessment & Plan     Patient Self-Management and Personalized Health Advice    The patient has been provided counseling and guidance about: diet, exercise, weight management, and prevention of cardiac or vascular disease and preventive services including:   Annual Wellness Visit (AWV).  Patient Instructions   Problem List Items Addressed This Visit       B12 deficiency    Overview   Recheck labs         Relevant Orders    Vitamin B12    Mixed hyperlipidemia    Overview   Lifestyle control.         Relevant Orders    Comprehensive Metabolic Panel    CBC & Differential    Lipid Panel    TSH    Benign essential hypertension    Overview   Taking lisinopril/hydrochlorothiazide daily.            Relevant Medications    lisinopril-hydrochlorothiazide (PRINZIDE,ZESTORETIC) 20-12.5 MG per tablet    Other Relevant Orders    Microalbumin / Creatinine Urine Ratio - Urine, Clean Catch    Urinalysis With Microscopic - Urine, Clean Catch     Annual physical exam - Primary    Overweight with body mass index (BMI) of 29 to 29.9 in adult (Chronic)    Overview              Chronic pain of left knee          Diagnosis Plan   1. Annual physical exam        2. Mixed hyperlipidemia  Comprehensive Metabolic Panel    CBC & Differential    Lipid Panel    TSH      3. Benign essential hypertension  Microalbumin / Creatinine Urine Ratio - Urine, Clean Catch    Urinalysis With Microscopic - Urine, Clean Catch      4. Chronic pain of left knee        5. Overweight with body mass index (BMI) of 29 to 29.9 in adult        6. B12 deficiency  Vitamin B12        Outpatient Encounter Medications as of 4/17/2025   Medication Sig Dispense Refill    finasteride (PROPECIA) 1 MG tablet TAKE 1 TABLET BY MOUTH DAILY 90 tablet 0    lisinopril-hydrochlorothiazide (PRINZIDE,ZESTORETIC) 20-12.5 MG per tablet Take 1 tablet by mouth Daily. 90 tablet 3    [DISCONTINUED] lisinopril-hydrochlorothiazide (PRINZIDE,ZESTORETIC) 20-12.5 MG per tablet TAKE 1 TABLET BY MOUTH DAILY 30 tablet 1    [DISCONTINUED] promethazine-dextromethorphan (PROMETHAZINE-DM) 6.25-15 MG/5ML syrup Take 5 mL by mouth 4 (Four) Times a Day As Needed for Cough. (Patient not taking: Reported on 4/17/2025) 180 mL 0    [DISCONTINUED] Sodium Sulfate-Mag Sulfate-KCl (Sutab) 9881-412-532 MG tablet  (Patient not taking: Reported on 4/17/2025)       No facility-administered encounter medications on file as of 4/17/2025.         Age appropriate preventive counseling done including age appropriate vaccines,regular  Mammogram and self breast exam, pap smear, colonoscopy, regular dental visits, mental health, injury prevention such as wearing seat belt and preventing falls, healthy  nutrition, healthy weight, regular physical exercise. Alcohol use is moderate.  Tobacco history-none. Drug use-none.  STD's-not at risk.       Objective   Vital Signs:  /88 (BP Location: Left arm, Patient Position: Sitting, Cuff Size: Adult)    "Pulse 58   Temp 98.6 °F (37 °C) (Infrared)   Ht 185.4 cm (72.99\")   Wt 102 kg (224 lb 3.2 oz)   SpO2 96%   BMI 29.59 kg/m²     [unfilled]    The following data was reviewed by: Sherri Anna MD on 04/17/2025:             Assessment and Plan   Diagnoses and all orders for this visit:    1. Annual physical exam (Primary)    2. Mixed hyperlipidemia  -     Comprehensive Metabolic Panel; Future  -     CBC & Differential; Future  -     Lipid Panel; Future  -     TSH; Future    3. Benign essential hypertension  -     Microalbumin / Creatinine Urine Ratio - Urine, Clean Catch; Future  -     Urinalysis With Microscopic - Urine, Clean Catch; Future    4. Chronic pain of left knee    5. Overweight with body mass index (BMI) of 29 to 29.9 in adult    6. B12 deficiency  -     Vitamin B12; Future    Other orders  -     lisinopril-hydrochlorothiazide (PRINZIDE,ZESTORETIC) 20-12.5 MG per tablet; Take 1 tablet by mouth Daily.  Dispense: 90 tablet; Refill: 3      Assessment & Plan  Benign essential hypertension.  - Blood pressure today is 134/88, compared to 104/76 in January.  - Continue taking lisinopril and hydrochlorothiazide daily.  - Decrease salt intake and maintain regular exercise.  - Home blood pressure monitoring is recommended to ensure readings are closer to 120/70. A 90-day supply of lisinopril and hydrochlorothiazide will be provided.    Mixed hyperlipidemia.  - Continue reducing fats, carbohydrates, and sugars in the diet.  - Regular exercise should be maintained.  - Cholesterol levels will be monitored with fasting labs next week.    B12 deficiency.  - B12 level was 339, which is in the lower part of the normal range.  - Take a B12 tablet daily.  - Monitor B12 levels to ensure they are >500.    Chronic pain of the left knee.  - Weight loss is recommended.  - Use a cold pack on the knee as needed after running or exercising.  - Voltaren gel can be used to decrease inflammation and aching.    Overweight.  - " BMI is 29. We discussed that BMI is probably not a good measure for him since he has a muscular upper body and works out regularly.  - Encouraged to lose weight gradually with an initial goal of getting back down to 217 lbs.  - A low-fat, healthy diet with smaller portions is recommended.  - Regular exercise should be continued.    Follow-up  - Follow up in 6 months for a fasting follow-up.          Follow Up   Return in about 6 months (around 10/17/2025) for recheck fasting.  Patient was given instructions and counseling regarding his condition or for health maintenance advice. Please see specific information pulled into the AVS if appropriate.     Note: Part of this note may be an electronic transcription/translation of spoken language to printed text using the Dragon Dictation System.     Aissatou Duong MA  Patient or patient representative verbalized consent for the use of Ambient Listening during the visit with  Sherri Anna MD for chart documentation. 4/20/2025  10:20 EDT

## 2025-04-20 NOTE — PATIENT INSTRUCTIONS
Patient Instructions  Problem List Items Addressed This Visit       B12 deficiency    Overview   Recheck labs         Relevant Orders    Vitamin B12    Mixed hyperlipidemia    Overview   Lifestyle control.         Relevant Orders    Comprehensive Metabolic Panel    CBC & Differential    Lipid Panel    TSH    Benign essential hypertension    Overview   Taking lisinopril/hydrochlorothiazide daily.            Relevant Medications    lisinopril-hydrochlorothiazide (PRINZIDE,ZESTORETIC) 20-12.5 MG per tablet    Other Relevant Orders    Microalbumin / Creatinine Urine Ratio - Urine, Clean Catch    Urinalysis With Microscopic - Urine, Clean Catch    Annual physical exam - Primary    Overweight with body mass index (BMI) of 29 to 29.9 in adult (Chronic)    Overview              Chronic pain of left knee       Exercising to Stay Healthy  To become healthy and stay healthy, it is recommended that you do moderate-intensity and vigorous-intensity exercise. You can tell that you are exercising at a moderate intensity if your heart starts beating faster and you start breathing faster but can still hold a conversation. You can tell that you are exercising at a vigorous intensity if you are breathing much harder and faster and cannot hold a conversation while exercising.  How can exercise benefit me?  Exercising regularly is important. It has many health benefits, such as:  Improving overall fitness, flexibility, and endurance.  Increasing bone density.  Helping with weight control.  Decreasing body fat.  Increasing muscle strength and endurance.  Reducing stress and tension, anxiety, depression, or anger.  Improving overall health.  What guidelines should I follow while exercising?  Before you start a new exercise program, talk with your health care provider.  Do not exercise so much that you hurt yourself, feel dizzy, or get very short of breath.  Wear comfortable clothes and wear shoes with good support.  Drink plenty of water  while you exercise to prevent dehydration or heat stroke.  Work out until your breathing and your heartbeat get faster (moderate intensity).  How often should I exercise?  Choose an activity that you enjoy, and set realistic goals. Your health care provider can help you make an activity plan that is individually designed and works best for you.  Exercise regularly as told by your health care provider. This may include:  Doing strength training two times a week, such as:  Lifting weights.  Using resistance bands.  Push-ups.  Sit-ups.  Yoga.  Doing a certain intensity of exercise for a given amount of time. Choose from these options:  A total of 150 minutes of moderate-intensity exercise every week.  A total of 75 minutes of vigorous-intensity exercise every week.  A mix of moderate-intensity and vigorous-intensity exercise every week.  Children, pregnant women, people who have not exercised regularly, people who are overweight, and older adults may need to talk with a health care provider about what activities are safe to perform. If you have a medical condition, be sure to talk with your health care provider before you start a new exercise program.  What are some exercise ideas?  Moderate-intensity exercise ideas include:  Walking 1 mile (1.6 km) in about 15 minutes.  Biking.  Hiking.  Golfing.  Dancing.  Water aerobics.  Vigorous-intensity exercise ideas include:  Walking 4.5 miles (7.2 km) or more in about 1 hour.  Jogging or running 5 miles (8 km) in about 1 hour.  Biking 10 miles (16.1 km) or more in about 1 hour.  Lap swimming.  Roller-skating or in-line skating.  Cross-country skiing.  Vigorous competitive sports, such as football, basketball, and soccer.  Jumping rope.  Aerobic dancing.  What are some everyday activities that can help me get exercise?  Yard work, such as:  Pushing a .  Raking and bagging leaves.  Washing your car.  Pushing a stroller.  Shoveling snow.  Gardening.  Washing windows or  floors.  How can I be more active in my day-to-day activities?  Use stairs instead of an elevator.  Take a walk during your lunch break.  If you drive, park your car farther away from your work or school.  If you take public transportation, get off one stop early and walk the rest of the way.  Stand up or walk around during all of your indoor phone calls.  Get up, stretch, and walk around every 30 minutes throughout the day.  Enjoy exercise with a friend. Support to continue exercising will help you keep a regular routine of activity.  Where to find more information  You can find more information about exercising to stay healthy from:  [place] Department of Health and Human Services: www.hhs.gov  Centers for Disease Control and Prevention (CDC): www.cdc.gov  Summary  Exercising regularly is important. It will improve your overall fitness, flexibility, and endurance.  Regular exercise will also improve your overall health. It can help you control your weight, reduce stress, and improve your bone density.  Do not exercise so much that you hurt yourself, feel dizzy, or get very short of breath.  Before you start a new exercise program, talk with your health care provider.  This information is not intended to replace advice given to you by your health care provider. Make sure you discuss any questions you have with your health care provider.  Document Revised: 04/15/2022 Document Reviewed: 04/15/2022  [employer] Patient Education © 2023 [employer] BMI for Adults  Body mass index (BMI) is a number found using a person's weight and height. BMI can help tell how much of a person's weight is made up of fat. BMI does not measure body fat directly. It is used instead of tests that directly measure body fat, which can be difficult and expensive.  What are BMI measurements used for?  BMI is useful to:  Find out if your weight puts you at higher risk for medical problems.  Help recommend changes, such as in diet and exercise. This  "can help you reach a healthy weight. BMI screening can be done again to see if these changes are working.  How is BMI calculated?  Your height and weight are measured. The BMI is found from those numbers. This can be done with [place] or metric measurements. Note that charts and online BMI calculators are available to help you find your BMI quickly and easily without doing these calculations.  To calculate your BMI in [place] measurements:  Measure your weight in pounds (lb).  Multiply the number of pounds by 703.  So, for an adult who weighs 150 lb, multiply that number by 703: 150 x 703, which equals 105,450.  Measure your height in inches. Then multiply that number by itself to get a measurement called \"inches squared.\"  So, for an adult who is 70 inches tall, the \"inches squared\" measurement is 70 inches x 70 inches, which equals 4,900 inches squared.  Divide the total from step 2 (number of lb x 703) by the total from step 3 (inches squared): 105,450 ÷ 4,900 = 21.5. This is your BMI.  To calculate your BMI in metric measurements:    Measure your weight in kilograms (kg).  For this example, the weight is 70 kg.  Measure your height in meters (m). Then multiply that number by itself to get a measurement called \"meters squared.\"  So, for an adult who is 1.75 m tall, the \"meters squared\" measurement is 1.75 m x 1.75 m, which equals 3.1 meters squared.  Divide the number of kilograms (your weight) by the meters squared number. In this example: 70 ÷ 3.1 = 22.6. This is your BMI.  What do the results mean?  BMI charts are used to see if you are underweight, normal weight, overweight, or obese. The following guidelines will be used:  Underweight: BMI less than 18.5.  Normal weight: BMI between 18.5 and 24.9.  Overweight: BMI between 25 and 29.9.  Obese: BMI of 30 or above.  BMI is a tool and cannot diagnose a condition. Talk with your health care provider about what your BMI means for you. Keep these notes in mind:  Weight " includes fat and muscle. Someone with a muscular build, such as an athlete, may have a BMI that is higher than 24.9. In cases like these, BMI is not a correct measure of body fat.  If you have a BMI of 25 or higher, your provider may need to do more testing to find out if excess body fat is the cause.  BMI is measured the same way for males and females. Females usually have more body fat than males of the same height and weight.  Where to find more information  For more information about BMI, including tools to quickly find your BMI, go to:  Centers for Disease Control and Prevention: cdc.gov  American Heart Association: heart.org  National Heart, Lung, and Blood [place]: nhlbi.nih.gov  This information is not intended to replace advice given to you by your health care provider. Make sure you discuss any questions you have with your health care provider.  Document Revised: 09/07/2023 Document Reviewed: 08/31/2023  [employer] Patient Education © 2024 [employer]Heart-Healthy Eating Plan  Many factors influence your heart (coronary) health, including eating and exercise habits. Coronary risk increases with abnormal blood fat (lipid) levels. Heart-healthy meal planning includes limiting unhealthy fats, increasing healthy fats, and making other diet and lifestyle changes.  What is my plan?  Your health care provider may recommend that you:  Limit your fat intake to _________% or less of your total calories each day.  Limit your saturated fat intake to _________% or less of your total calories each day.  Limit the amount of cholesterol in your diet to less than _________ mg per day.  What are tips for following this plan?  Cooking  Cook foods using methods other than frying. Baking, boiling, grilling, and broiling are all good options. Other ways to reduce fat include:  Removing the skin from poultry.  Removing all visible fats from meats.  Steaming vegetables in water or broth.  Meal planning  A plate with examples of  foods in a healthy diet.      At meals, imagine dividing your plate into fourths:  Fill one-half of your plate with vegetables and green salads.  Fill one-fourth of your plate with whole grains.  Fill one-fourth of your plate with lean protein foods.  Eat 4-5 servings of vegetables per day. One serving equals 1 cup raw or cooked vegetable, or 2 cups raw leafy greens.  Eat 4-5 servings of fruit per day. One serving equals 1 medium whole fruit, ¼ cup dried fruit, ½ cup fresh, frozen, or canned fruit, or ½ cup 100% fruit juice.  Eat more foods that contain soluble fiber. Examples include apples, broccoli, carrots, beans, peas, and barley. Aim to get 25-30 g of fiber per day.  Increase your consumption of legumes, nuts, and seeds to 4-5 servings per week. One serving of dried beans or legumes equals ½ cup cooked, 1 serving of nuts is ¼ cup, and 1 serving of seeds equals 1 tablespoon.  Fats  Choose healthy fats more often. Choose monounsaturated and polyunsaturated fats, such as olive and canola oils, flaxseeds, walnuts, almonds, and seeds.  Eat more omega-3 fats. Choose salmon, mackerel, sardines, tuna, flaxseed oil, and ground flaxseeds. Aim to eat fish at least 2 times each week.  Check food labels carefully to identify foods with trans fats or high amounts of saturated fat.  Limit saturated fats. These are found in animal products, such as meats, butter, and cream. Plant sources of saturated fats include palm oil, palm kernel oil, and coconut oil.  Avoid foods with partially hydrogenated oils in them. These contain trans fats. Examples are stick margarine, some tub margarines, cookies, crackers, and other baked goods.  Avoid fried foods.  General information  Eat more home-cooked food and less restaurant, buffet, and fast food.  Limit or avoid alcohol.  Limit foods that are high in starch and sugar.  Lose weight if you are overweight. Losing just 5-10% of your body weight can help your overall health and prevent  diseases such as diabetes and heart disease.  Monitor your salt (sodium) intake, especially if you have high blood pressure. Talk with your health care provider about your sodium intake.  Try to incorporate more vegetarian meals weekly.  What foods can I eat?  Fruits  All fresh, canned (in natural juice), or frozen fruits.  Vegetables  Fresh or frozen vegetables (raw, steamed, roasted, or grilled). Green salads.  Grains  Most grains. Choose whole wheat and whole grains most of the time. Rice and pasta, including brown rice and pastas made with whole wheat.  Meats and other proteins  Lean, well-trimmed beef, veal, pork, and lamb. Chicken and turkey without skin. All fish and shellfish. Wild duck, rabbit, pheasant, and venison. Egg whites or low-cholesterol egg substitutes. Dried beans, peas, lentils, and tofu. Seeds and most nuts.  Dairy  Low-fat or nonfat cheeses, including ricotta and mozzarella. Skim or 1% milk (liquid, powdered, or evaporated). Buttermilk made with low-fat milk. Nonfat or low-fat yogurt.  Fats and oils  Non-hydrogenated (trans-free) margarines. Vegetable oils, including soybean, sesame, sunflower, olive, peanut, safflower, corn, canola, and cottonseed. Salad dressings or mayonnaise made with a vegetable oil.  Beverages  Water (mineral or sparkling). Coffee and tea. Diet carbonated beverages.  Sweets and desserts  Sherbet, gelatin, and fruit ice. Small amounts of dark chocolate.  Limit all sweets and desserts.  Seasonings and condiments  All seasonings and condiments.  The items listed above may not be a complete list of foods and beverages you can eat. Contact a dietitian for more options.  What foods are not recommended?  Fruits  Canned fruit in heavy syrup. Fruit in cream or butter sauce. Fried fruit. Limit coconut.  Vegetables  Vegetables cooked in cheese, cream, or butter sauce. Fried vegetables.  Grains  Breads made with saturated or trans fats, oils, or whole milk. Croissants. Sweet rolls.  Donuts. High-fat crackers, such as cheese crackers.  Meats and other proteins  Fatty meats, such as hot dogs, ribs, sausage, khanna, rib-eye roast or steak. High-fat deli meats, such as salami and bologna. Caviar. Domestic duck and goose. Organ meats, such as liver.  Dairy  Cream, sour cream, cream cheese, and creamed cottage cheese. Whole-milk cheeses. Whole or 2% milk (liquid, evaporated, or condensed). Whole buttermilk. Cream sauce or high-fat cheese sauce. Whole-milk yogurt.  Fats and oils  Meat fat, or shortening. Cocoa butter, hydrogenated oils, palm oil, coconut oil, palm kernel oil. Solid fats and shortenings, including khanna fat, salt pork, lard, and butter. Nondairy cream substitutes. Salad dressings with cheese or sour cream.  Beverages  Regular sodas and any drinks with added sugar.  Sweets and desserts  Frosting. Pudding. Cookies. Cakes. Pies. Milk chocolate or white chocolate. Buttered syrups. Full-fat ice cream or ice cream drinks.  The items listed above may not be a complete list of foods and beverages to avoid. Contact a dietitian for more information.  Summary  Heart-healthy meal planning includes limiting unhealthy fats, increasing healthy fats, and making other diet and lifestyle changes.  Lose weight if you are overweight. Losing just 5-10% of your body weight can help your overall health and prevent diseases such as diabetes and heart disease.  Focus on eating a balance of foods, including fruits and vegetables, low-fat or nonfat dairy, lean protein, nuts and legumes, whole grains, and heart-healthy oils and fats.  This information is not intended to replace advice given to you by your health care provider. Make sure you discuss any questions you have with your health care provider.  Document Revised: 04/28/2022 Document Reviewed: 04/28/2022  [employer] Patient Education © 2022 [employer]

## 2025-06-06 RX ORDER — LISINOPRIL AND HYDROCHLOROTHIAZIDE 12.5; 2 MG/1; MG/1
1 TABLET ORAL DAILY
Qty: 90 TABLET | Refills: 3 | Status: SHIPPED | OUTPATIENT
Start: 2025-06-06

## 2025-07-28 RX ORDER — LISINOPRIL AND HYDROCHLOROTHIAZIDE 12.5; 2 MG/1; MG/1
1 TABLET ORAL DAILY
Qty: 30 TABLET | Refills: 0 | Status: SHIPPED | OUTPATIENT
Start: 2025-07-28

## 2025-07-28 NOTE — TELEPHONE ENCOUNTER
Rx Refill Note  Requested Prescriptions     Pending Prescriptions Disp Refills    lisinopril-hydrochlorothiazide (PRINZIDE,ZESTORETIC) 20-12.5 MG per tablet [Pharmacy Med Name: LISINOPRIL-HCTZ 20-12.5 MG TAB] 30 tablet      Sig: TAKE 1 TABLET BY MOUTH DAILY      Last office visit with prescribing clinician: 4/17/2025   Last telemedicine visit with prescribing clinician: Visit date not found   Next office visit with prescribing clinician: 10/20/2025                         Would you like a call back once the refill request has been completed: [] Yes [] No    If the office needs to give you a call back, can they leave a voicemail: [] Yes [] No    Kimberly Judd LPN  07/28/25, 09:22 EDT

## 2025-08-29 ENCOUNTER — LAB (OUTPATIENT)
Dept: LAB | Facility: HOSPITAL | Age: 50
End: 2025-08-29
Payer: COMMERCIAL

## 2025-08-29 DIAGNOSIS — E78.2 MIXED HYPERLIPIDEMIA: ICD-10-CM

## 2025-08-29 DIAGNOSIS — I10 BENIGN ESSENTIAL HYPERTENSION: ICD-10-CM

## 2025-08-29 DIAGNOSIS — E53.8 B12 DEFICIENCY: ICD-10-CM

## 2025-08-29 LAB
ALBUMIN SERPL-MCNC: 4.5 G/DL (ref 3.5–5.2)
ALBUMIN UR-MCNC: <1.2 MG/DL
ALBUMIN/GLOB SERPL: 1.7 G/DL
ALP SERPL-CCNC: 43 U/L (ref 39–117)
ALT SERPL W P-5'-P-CCNC: 29 U/L (ref 1–41)
ANION GAP SERPL CALCULATED.3IONS-SCNC: 13.5 MMOL/L (ref 5–15)
AST SERPL-CCNC: 21 U/L (ref 1–40)
BACTERIA UR QL AUTO: NORMAL /HPF
BASOPHILS # BLD AUTO: 0.03 10*3/MM3 (ref 0–0.2)
BASOPHILS NFR BLD AUTO: 0.7 % (ref 0–1.5)
BILIRUB SERPL-MCNC: 0.7 MG/DL (ref 0–1.2)
BILIRUB UR QL STRIP: NEGATIVE
BUN SERPL-MCNC: 20 MG/DL (ref 6–20)
BUN/CREAT SERPL: 19.8 (ref 7–25)
CALCIUM SPEC-SCNC: 9.4 MG/DL (ref 8.6–10.5)
CHLORIDE SERPL-SCNC: 103 MMOL/L (ref 98–107)
CHOLEST SERPL-MCNC: 164 MG/DL (ref 0–200)
CLARITY UR: CLEAR
CO2 SERPL-SCNC: 22.5 MMOL/L (ref 22–29)
COLOR UR: YELLOW
CREAT SERPL-MCNC: 1.01 MG/DL (ref 0.76–1.27)
CREAT UR-MCNC: 150.7 MG/DL
DEPRECATED RDW RBC AUTO: 38.8 FL (ref 37–54)
EGFRCR SERPLBLD CKD-EPI 2021: 90.6 ML/MIN/1.73
EOSINOPHIL # BLD AUTO: 0.22 10*3/MM3 (ref 0–0.4)
EOSINOPHIL NFR BLD AUTO: 4.8 % (ref 0.3–6.2)
ERYTHROCYTE [DISTWIDTH] IN BLOOD BY AUTOMATED COUNT: 12.1 % (ref 12.3–15.4)
GLOBULIN UR ELPH-MCNC: 2.7 GM/DL
GLUCOSE SERPL-MCNC: 84 MG/DL (ref 65–99)
GLUCOSE UR STRIP-MCNC: NEGATIVE MG/DL
HCT VFR BLD AUTO: 46.8 % (ref 37.5–51)
HDLC SERPL-MCNC: 35 MG/DL (ref 40–60)
HGB BLD-MCNC: 15.8 G/DL (ref 13–17.7)
HGB UR QL STRIP.AUTO: NEGATIVE
HYALINE CASTS UR QL AUTO: NORMAL /LPF
IMM GRANULOCYTES # BLD AUTO: 0.01 10*3/MM3 (ref 0–0.05)
IMM GRANULOCYTES NFR BLD AUTO: 0.2 % (ref 0–0.5)
KETONES UR QL STRIP: NEGATIVE
LDLC SERPL CALC-MCNC: 112 MG/DL (ref 0–100)
LDLC/HDLC SERPL: 3.16 {RATIO}
LEUKOCYTE ESTERASE UR QL STRIP.AUTO: NEGATIVE
LYMPHOCYTES # BLD AUTO: 1.2 10*3/MM3 (ref 0.7–3.1)
LYMPHOCYTES NFR BLD AUTO: 26 % (ref 19.6–45.3)
MCH RBC QN AUTO: 29.9 PG (ref 26.6–33)
MCHC RBC AUTO-ENTMCNC: 33.8 G/DL (ref 31.5–35.7)
MCV RBC AUTO: 88.5 FL (ref 79–97)
MICROALBUMIN/CREAT UR: NORMAL MG/G{CREAT}
MONOCYTES # BLD AUTO: 0.37 10*3/MM3 (ref 0.1–0.9)
MONOCYTES NFR BLD AUTO: 8 % (ref 5–12)
NEUTROPHILS NFR BLD AUTO: 2.78 10*3/MM3 (ref 1.7–7)
NEUTROPHILS NFR BLD AUTO: 60.3 % (ref 42.7–76)
NITRITE UR QL STRIP: NEGATIVE
NRBC BLD AUTO-RTO: 0 /100 WBC (ref 0–0.2)
PH UR STRIP.AUTO: 5.5 [PH] (ref 5–8)
PLATELET # BLD AUTO: 278 10*3/MM3 (ref 140–450)
PMV BLD AUTO: 9.7 FL (ref 6–12)
POTASSIUM SERPL-SCNC: 4.3 MMOL/L (ref 3.5–5.2)
PROT SERPL-MCNC: 7.2 G/DL (ref 6–8.5)
PROT UR QL STRIP: NEGATIVE
RBC # BLD AUTO: 5.29 10*6/MM3 (ref 4.14–5.8)
RBC # UR STRIP: NORMAL /HPF
REF LAB TEST METHOD: NORMAL
SODIUM SERPL-SCNC: 139 MMOL/L (ref 136–145)
SP GR UR STRIP: 1.02 (ref 1–1.03)
SQUAMOUS #/AREA URNS HPF: NORMAL /HPF
TRIGL SERPL-MCNC: 92 MG/DL (ref 0–150)
TSH SERPL DL<=0.05 MIU/L-ACNC: 1.08 UIU/ML (ref 0.27–4.2)
UROBILINOGEN UR QL STRIP: NORMAL
VIT B12 BLD-MCNC: 348 PG/ML (ref 211–946)
VLDLC SERPL-MCNC: 17 MG/DL (ref 5–40)
WBC # UR STRIP: NORMAL /HPF
WBC NRBC COR # BLD AUTO: 4.61 10*3/MM3 (ref 3.4–10.8)

## 2025-08-29 PROCEDURE — 82607 VITAMIN B-12: CPT

## 2025-08-29 PROCEDURE — 80050 GENERAL HEALTH PANEL: CPT

## 2025-08-29 PROCEDURE — 82043 UR ALBUMIN QUANTITATIVE: CPT

## 2025-08-29 PROCEDURE — 81001 URINALYSIS AUTO W/SCOPE: CPT

## 2025-08-29 PROCEDURE — 82570 ASSAY OF URINE CREATININE: CPT

## 2025-08-29 PROCEDURE — 80061 LIPID PANEL: CPT
